# Patient Record
Sex: MALE | Race: WHITE | Employment: FULL TIME | URBAN - NONMETROPOLITAN AREA
[De-identification: names, ages, dates, MRNs, and addresses within clinical notes are randomized per-mention and may not be internally consistent; named-entity substitution may affect disease eponyms.]

---

## 2017-04-24 RX ORDER — TESTOSTERONE CYPIONATE 200 MG/ML
200 INJECTION INTRAMUSCULAR
Qty: 10 ML | Refills: 1 | Status: SHIPPED | OUTPATIENT
Start: 2017-04-24 | End: 2017-12-08 | Stop reason: SDUPTHER

## 2017-09-27 DIAGNOSIS — K21.9 GASTROESOPHAGEAL REFLUX DISEASE WITH HIATAL HERNIA: ICD-10-CM

## 2017-09-27 DIAGNOSIS — K44.9 GASTROESOPHAGEAL REFLUX DISEASE WITH HIATAL HERNIA: ICD-10-CM

## 2017-09-27 DIAGNOSIS — I10 ESSENTIAL HYPERTENSION: ICD-10-CM

## 2017-09-27 RX ORDER — OMEPRAZOLE 40 MG/1
CAPSULE, DELAYED RELEASE ORAL
Qty: 90 CAPSULE | Refills: 0 | Status: SHIPPED | OUTPATIENT
Start: 2017-09-27 | End: 2018-01-08 | Stop reason: SDUPTHER

## 2017-09-27 RX ORDER — METOPROLOL SUCCINATE 100 MG/1
TABLET, EXTENDED RELEASE ORAL
Qty: 90 TABLET | Refills: 0 | Status: SHIPPED | OUTPATIENT
Start: 2017-09-27 | End: 2018-01-08 | Stop reason: SDUPTHER

## 2017-10-05 ENCOUNTER — OFFICE VISIT (OUTPATIENT)
Dept: FAMILY MEDICINE CLINIC | Age: 32
End: 2017-10-05

## 2017-10-05 VITALS
HEIGHT: 68 IN | OXYGEN SATURATION: 99 % | SYSTOLIC BLOOD PRESSURE: 132 MMHG | HEART RATE: 91 BPM | DIASTOLIC BLOOD PRESSURE: 80 MMHG | WEIGHT: 243 LBS | BODY MASS INDEX: 36.83 KG/M2

## 2017-10-05 DIAGNOSIS — Z12.5 SCREENING PSA (PROSTATE SPECIFIC ANTIGEN): ICD-10-CM

## 2017-10-05 DIAGNOSIS — F41.9 ANXIETY: ICD-10-CM

## 2017-10-05 DIAGNOSIS — K44.9 GASTROESOPHAGEAL REFLUX DISEASE WITH HIATAL HERNIA: ICD-10-CM

## 2017-10-05 DIAGNOSIS — I10 ESSENTIAL HYPERTENSION: Primary | ICD-10-CM

## 2017-10-05 DIAGNOSIS — R53.83 FATIGUE, UNSPECIFIED TYPE: ICD-10-CM

## 2017-10-05 DIAGNOSIS — J45.20 MILD INTERMITTENT ASTHMA WITHOUT COMPLICATION: ICD-10-CM

## 2017-10-05 DIAGNOSIS — K21.9 GASTROESOPHAGEAL REFLUX DISEASE WITH HIATAL HERNIA: ICD-10-CM

## 2017-10-05 DIAGNOSIS — G47.9 SLEEP DISTURBANCE: ICD-10-CM

## 2017-10-05 DIAGNOSIS — E29.1 HYPOGONADISM IN MALE: ICD-10-CM

## 2017-10-05 PROCEDURE — 99214 OFFICE O/P EST MOD 30 MIN: CPT | Performed by: FAMILY MEDICINE

## 2017-10-05 RX ORDER — ALBUTEROL SULFATE 90 UG/1
2 AEROSOL, METERED RESPIRATORY (INHALATION) EVERY 6 HOURS PRN
Qty: 1 INHALER | Refills: 3 | Status: SHIPPED | OUTPATIENT
Start: 2017-10-05 | End: 2017-12-14

## 2017-10-05 RX ORDER — TRAZODONE HYDROCHLORIDE 50 MG/1
50 TABLET ORAL NIGHTLY
Qty: 30 TABLET | Refills: 2 | Status: SHIPPED | OUTPATIENT
Start: 2017-10-05 | End: 2017-12-14

## 2017-10-05 ASSESSMENT — PATIENT HEALTH QUESTIONNAIRE - PHQ9
1. LITTLE INTEREST OR PLEASURE IN DOING THINGS: 0
2. FEELING DOWN, DEPRESSED OR HOPELESS: 0
SUM OF ALL RESPONSES TO PHQ9 QUESTIONS 1 & 2: 0
SUM OF ALL RESPONSES TO PHQ QUESTIONS 1-9: 0

## 2017-10-05 NOTE — MR AVS SNAPSHOT
type 2 diabetes, stroke, gallstones, arthritis, sleep apnea, and certain cancers. BMI is not perfect. It may overestimate body fat in athletes and people who are more muscular. Even a small weight loss (between 5 and 10 percent of your current weight) by decreasing your calorie intake and becoming more physically active will help lower your risk of developing or worsening diseases associated with obesity. Learn more at: [employer]             Today's Medication Changes          These changes are accurate as of: 10/5/17  5:07 PM.  If you have any questions, ask your nurse or doctor. START taking these medications           albuterol sulfate  (90 Base) MCG/ACT inhaler   Commonly known as:  PROAIR HFA   Instructions:  Inhale 2 puffs into the lungs every 6 hours as needed for Wheezing   Quantity:  1 Inhaler   Refills:  3   Started by:  Dominic Starks MD       traZODone 50 MG tablet   Commonly known as:  DESYREL   Instructions:   Take 1 tablet by mouth nightly   Quantity:  30 tablet   Refills:  2   Started by:  Dominic Starks MD         STOP taking these medications           amLODIPine 10 MG tablet   Commonly known as:  NORVASC   Stopped by:  Dominic Starks MD       Testosterone 30 MG/ACT Soln   Commonly known as:  [place] by:  Dominic Starks MD            Where to Get Your Medications      These medications were sent to [place] [address] [facility] - [facility] [address] - P [phone] - F [phone]  [address] [facility] [address]     Phone:  [phone]     albuterol sulfate  (90 Base) MCG/ACT inhaler    traZODone 50 MG tablet               Your Current Medications Are              albuterol sulfate HFA (PROAIR HFA) 108 (90 Base) MCG/ACT inhaler Inhale 2 puffs into the lungs every 6 hours as needed for Wheezing    traZODone (DESYREL) 50 MG tablet Take 1 tablet by mouth nightly metoprolol succinate (TOPROL XL) 100 MG extended release tablet TAKE 1 TABLET BY MOUTH DAILY    omeprazole (PRILOSEC) 40 MG delayed release capsule TAKE 1 CAPSULE BY MOUTH DAILY    testosterone cypionate (DEPOTESTOTERONE CYPIONATE) 200 MG/ML injection Inject 1 mL into the muscle every 14 days    Insulin Pen Needle 29G X 12.7MM MISC 1 each by Does not apply route daily    Insulin Syringes, Disposable, U-100 1 ML MISC 1 each by Does not apply route daily    clotrimazole-betamethasone (LOTRISONE) 1-0.05 % cream Apply topically 2 times daily. Allergies              Amlodipine Swelling    Compazine [Prochlorperazine Maleate] Swelling    Lisinopril Hives, Swelling    Skin tightens on his face and gets hives         Additional Information        Basic Information     Date Of Birth Sex Race Ethnicity Preferred Language    1985 Male White Non-/Non  English      Problem List as of 10/5/2017  Date Reviewed: 3/10/2016                Hypertension    Asthma    Gastroesophageal reflux disease with hiatal hernia    Dermatitis      Preventive Care        Date Due    HIV screening is recommended for all people regardless of risk factors  aged 15-65 years at least once (lifetime) who have never been HIV tested. 6/15/1998    Tetanus Combination Vaccine (1 - Tdap) 6/15/2002    Pneumococcal Vaccine - Pneumovax for adults aged 19-64 years with: chronic heart disease, chronic lung disease, diabetes mellitus, alcoholism, chronic liver disease, or cigarette smoking. (1 of 1 - PPSV23) 6/15/2002    Yearly Flu Vaccine (1) 9/1/2017            [employer] Signup           Our records indicate that you have an active [employer] account. You can view your After Visit Summary by going to [email or web address] and logging in with your [employer] username and password.       If you don't have a [employer] username and password but a parent or guardian has access to your record, the parent or guardian should login with their own [employer] username and password and access your record to view the After Visit Summary. Additional Information  If you have questions, please contact the physician practice where you receive care. Remember, [employer] is NOT to be used for urgent needs. For medical emergencies, dial 911. For questions regarding your [employer] account call [phone]. If you have a clinical question, please call your doctor's office.

## 2017-10-05 NOTE — PROGRESS NOTES
Chief Complaint   Patient presents with    Annual Exam     annual exam       HPI:  Saman Arboleda is a 27 y.o. male      Follow up HTN, lipids, hypogonadism    Taking meds without incident    ALSO  Asthma acting up  Used to be on controllers      Over Due for labs  Continues on testosterone  Still with fatigue      ALSO  Mom undergoing some cancer treatments  Anxiety  Work stress  Not sleeping well      Wt Readings from Last 3 Encounters:   10/05/17 243 lb (110.2 kg)   03/10/16 241 lb (109.3 kg)   09/08/15 234 lb 3.2 oz (106.2 kg)         Past Medical History:   Diagnosis Date    Asthma     Dermatitis     Fatty liver     Gastroesophageal reflux disease with hiatal hernia     Hypertension     Obesity (BMI 30-39. 9)      Past Surgical History:   Procedure Laterality Date    LYMPH NODE BIOPSY      surgery due to cat scratch disease age 1   [facility] WISDOM TOOTH EXTRACTION       No family history on file.   Social History     Social History    Marital status:      Spouse name: N/A    Number of children: 1    Years of education: N/A     Social History Main Topics    Smoking status: Never Smoker    Smokeless tobacco: Never Used    Alcohol use No    Drug use: No    Sexual activity: Not Asked     Other Topics Concern    None     Social History Narrative     Current Outpatient Prescriptions   Medication Sig Dispense Refill    albuterol sulfate HFA (PROAIR HFA) 108 (90 Base) MCG/ACT inhaler Inhale 2 puffs into the lungs every 6 hours as needed for Wheezing 1 Inhaler 3    traZODone (DESYREL) 50 MG tablet Take 1 tablet by mouth nightly 30 tablet 2    metoprolol succinate (TOPROL XL) 100 MG extended release tablet TAKE 1 TABLET BY MOUTH DAILY 90 tablet 0    omeprazole (PRILOSEC) 40 MG delayed release capsule TAKE 1 CAPSULE BY MOUTH DAILY 90 capsule 0    testosterone cypionate (DEPOTESTOTERONE CYPIONATE) 200 MG/ML injection Inject 1 mL into the muscle every 14 days 10 mL 1    Insulin Pen Needle 29G X 12.7MM MISC 1 each by Does not apply route daily 100 each 3    Insulin Syringes, Disposable, U-100 1 ML MISC 1 each by Does not apply route daily 100 each 3    clotrimazole-betamethasone (LOTRISONE) 1-0.05 % cream Apply topically 2 times daily. 45 g 1     No current facility-administered medications for this visit. Allergies   Allergen Reactions    Amlodipine Swelling    Compazine [Prochlorperazine Maleate] Swelling    Lisinopril Hives and Swelling     Skin tightens on his face and gets hives       Review of Systems:   General ROS: no f/c/n/v  Respiratory ROS: no cough, shortness of breath, or wheezing  Cardiovascular ROS: no chest pain or dyspnea on exertion  Gastrointestinal ROS: no abdominal pain, change in bowel habits, or black or bloody stools  Genito-Urinary ROS: per HPI  Musculoskeletal ROS: negative for - gait disturbance, joint pain or joint stiffness  Neurological ROS: negative for - behavioral changes, memory loss, numbness/tingling, tremors or weakness    In general patient otherwise reports feeling well. Physical Exam:  /80  Pulse 91  Ht 5' 8\" (1.727 m)  Wt 243 lb (110.2 kg)  SpO2 99%  BMI 36.95 kg/m2    Gen: Well, NAD, Alert, Oriented x 3   HEENT: EOMI, eyes clear, MMM  Skin: bilateral scaly patches with odor dorsum of feet  Neck:thyromegaly  Lungs: CTA B w/out Rales/Wheezes/Rhonchi, Good respiratory effort   Heart: RRR, S1S2, w/out M/R/G, non-displaced PMI   Abdomen: Soft NT/ND, w/out R/G, w/ +BSx4   Ext: No C/C/E Bilaterally. Neuro: Neurovascularly intact w/ Sensory/Motor intact UE/LE Bilaterally.     Lab Results   Component Value Date    WBC 7.7 03/04/2016    HGB 15.5 03/04/2016    HCT 47.2 03/04/2016     03/04/2016    CHOL 206 (H) 03/04/2016    TRIG 133 03/04/2016    HDL 43 03/04/2016    ALT 82 (H) 03/04/2016    AST 28 03/04/2016     03/04/2016    K 4.8 03/04/2016     03/04/2016    CREATININE 0.73 03/04/2016    BUN 18 03/04/2016    CO2 22 03/04/2016 TSH 3.580 03/10/2016         A&P  1. Essential hypertension  Comprehensive Metabolic Panel    Lipid Panel   2. Hypogonadism in male  Testosterone Male   3. Gastroesophageal reflux disease with hiatal hernia     4. Screening PSA (prostate specific antigen)  PSA Screening   5. Fatigue, unspecified type  CBC    TSH without Reflex   6. Mild intermittent asthma without complication  albuterol sulfate HFA (PROAIR HFA) 108 (90 Base) MCG/ACT inhaler   7. Anxiety  traZODone (DESYREL) 50 MG tablet   8.  Sleep disturbance  traZODone (DESYREL) 50 MG tablet     Trial of trazodone  Fasting labs    proair inhaler     Continue current bp med  Healthy diet and exercise    Continue testosteone    Will get flu shot at drug Kathy Gamez MD

## 2017-10-30 DIAGNOSIS — Z12.5 SCREENING PSA (PROSTATE SPECIFIC ANTIGEN): ICD-10-CM

## 2017-10-30 DIAGNOSIS — I10 ESSENTIAL HYPERTENSION: ICD-10-CM

## 2017-10-30 DIAGNOSIS — R53.83 FATIGUE, UNSPECIFIED TYPE: ICD-10-CM

## 2017-10-30 DIAGNOSIS — E29.1 HYPOGONADISM IN MALE: ICD-10-CM

## 2017-10-30 LAB
ALBUMIN SERPL-MCNC: 4.2 G/DL (ref 3.9–4.9)
ALP BLD-CCNC: 49 U/L (ref 35–104)
ALT SERPL-CCNC: 66 U/L (ref 0–41)
ANION GAP SERPL CALCULATED.3IONS-SCNC: 15 MEQ/L (ref 7–13)
AST SERPL-CCNC: 26 U/L (ref 0–40)
BILIRUB SERPL-MCNC: 0.5 MG/DL (ref 0–1.2)
BUN BLDV-MCNC: 11 MG/DL (ref 6–20)
CALCIUM SERPL-MCNC: 9.1 MG/DL (ref 8.6–10.2)
CHLORIDE BLD-SCNC: 102 MEQ/L (ref 98–107)
CHOLESTEROL, TOTAL: 202 MG/DL (ref 0–199)
CO2: 25 MEQ/L (ref 22–29)
CREAT SERPL-MCNC: 0.67 MG/DL (ref 0.7–1.2)
GFR AFRICAN AMERICAN: >60
GFR NON-AFRICAN AMERICAN: >60
GLOBULIN: 2.3 G/DL (ref 2.3–3.5)
GLUCOSE BLD-MCNC: 92 MG/DL (ref 74–109)
HCT VFR BLD CALC: 50.4 % (ref 42–52)
HDLC SERPL-MCNC: 38 MG/DL (ref 40–59)
HEMOGLOBIN: 16.7 G/DL (ref 14–18)
LDL CHOLESTEROL CALCULATED: 134 MG/DL (ref 0–129)
MCH RBC QN AUTO: 29.5 PG (ref 27–31.3)
MCHC RBC AUTO-ENTMCNC: 33.1 % (ref 33–37)
MCV RBC AUTO: 89 FL (ref 80–100)
PDW BLD-RTO: 14.1 % (ref 11.5–14.5)
PLATELET # BLD: 310 K/UL (ref 130–400)
POTASSIUM SERPL-SCNC: 4.7 MEQ/L (ref 3.5–5.1)
PROSTATE SPECIFIC ANTIGEN: 0.44 NG/ML
RBC # BLD: 5.66 M/UL (ref 4.7–6.1)
SODIUM BLD-SCNC: 142 MEQ/L (ref 132–144)
TOTAL PROTEIN: 6.5 G/DL (ref 6.4–8.1)
TRIGL SERPL-MCNC: 152 MG/DL (ref 0–200)
TSH SERPL DL<=0.05 MIU/L-ACNC: 3.1 UIU/ML (ref 0.27–4.2)
WBC # BLD: 10.6 K/UL (ref 4.8–10.8)

## 2017-10-31 LAB — TESTOSTERONE TOTAL-MALE: 837 NG/DL (ref 300–1080)

## 2017-12-14 ENCOUNTER — OFFICE VISIT (OUTPATIENT)
Dept: FAMILY MEDICINE CLINIC | Age: 32
End: 2017-12-14

## 2017-12-14 VITALS
HEART RATE: 87 BPM | BODY MASS INDEX: 37.28 KG/M2 | WEIGHT: 246 LBS | TEMPERATURE: 98 F | HEIGHT: 68 IN | DIASTOLIC BLOOD PRESSURE: 82 MMHG | OXYGEN SATURATION: 98 % | SYSTOLIC BLOOD PRESSURE: 130 MMHG

## 2017-12-14 DIAGNOSIS — R40.0 DAYTIME SLEEPINESS: ICD-10-CM

## 2017-12-14 DIAGNOSIS — M25.50 POLYARTHRALGIA: ICD-10-CM

## 2017-12-14 DIAGNOSIS — G47.9 SLEEP DISTURBANCE: ICD-10-CM

## 2017-12-14 DIAGNOSIS — R53.83 FATIGUE, UNSPECIFIED TYPE: ICD-10-CM

## 2017-12-14 DIAGNOSIS — M25.50 POLYARTHRALGIA: Primary | ICD-10-CM

## 2017-12-14 DIAGNOSIS — R06.83 SNORING: ICD-10-CM

## 2017-12-14 LAB — SEDIMENTATION RATE, ERYTHROCYTE: 1 MM (ref 0–10)

## 2017-12-14 PROCEDURE — 99213 OFFICE O/P EST LOW 20 MIN: CPT | Performed by: FAMILY MEDICINE

## 2017-12-14 RX ORDER — METHYLPREDNISOLONE 4 MG/1
TABLET ORAL
Qty: 1 KIT | Refills: 0 | Status: SHIPPED | OUTPATIENT
Start: 2017-12-14 | End: 2018-01-15

## 2017-12-14 RX ORDER — DULOXETIN HYDROCHLORIDE 30 MG/1
30 CAPSULE, DELAYED RELEASE ORAL DAILY
Qty: 30 CAPSULE | Refills: 3 | Status: SHIPPED | OUTPATIENT
Start: 2017-12-14 | End: 2018-01-15 | Stop reason: SDUPTHER

## 2017-12-14 NOTE — PROGRESS NOTES
Chief Complaint   Patient presents with    Other     \" has been feeling well for a long time\", hand and feet pain, arm pain, in morngs can't walk, trouble sleeping    Sinus Problem     x 1 week        HPI:  Adilson Richards is a 27 y.o. male    \"just not feeling well for a long time\"  Morning stiffness    Anxiety    Hands and feet \"kill him\" in the morning    \"forgetful\"    Labs were ok  Continues on testosterone  Still with fatigue      Mom undergoing some cancer treatments  Anxiety  Work stress  Not sleeping well    Taking no advil/aleve or other pain meds    Morning stiffness seems to work itself out    Denies red, hot, swollen joints    Hands will get \"weird tingling feeling\"    Wt Readings from Last 3 Encounters:   12/14/17 246 lb (111.6 kg)   10/05/17 243 lb (110.2 kg)   03/10/16 241 lb (109.3 kg)         Past Medical History:   Diagnosis Date    Asthma     Dermatitis     Fatty liver     Gastroesophageal reflux disease with hiatal hernia     Hypertension     Obesity (BMI 30-39. 9)      Past Surgical History:   Procedure Laterality Date    LYMPH NODE BIOPSY      surgery due to cat scratch disease age 1   [facility] WISDOM TOOTH EXTRACTION       History reviewed. No pertinent family history. Social History     Social History    Marital status:      Spouse name: N/A    Number of children: 1    Years of education: N/A     Social History Main Topics    Smoking status: Never Smoker    Smokeless tobacco: Never Used    Alcohol use No    Drug use: No    Sexual activity: Not Asked     Other Topics Concern    None     Social History Narrative    None     Current Outpatient Prescriptions   Medication Sig Dispense Refill    DULoxetine (CYMBALTA) 30 MG extended release capsule Take 1 capsule by mouth daily 30 capsule 3    methylPREDNISolone (MEDROL DOSEPACK) 4 MG tablet Take by mouth.  1 kit 0    testosterone cypionate (DEPOTESTOTERONE CYPIONATE) 200 MG/ML injection INJECT 1 ML INTO THE MUSCLE EVERY 14 10/30/2017    ALT 66 (H) 10/30/2017    AST 26 10/30/2017     10/30/2017    K 4.7 10/30/2017     10/30/2017    CREATININE 0.67 (L) 10/30/2017    BUN 11 10/30/2017    CO2 25 10/30/2017    TSH 3.100 10/30/2017    PSA 0.44 10/30/2017         A&P  1. Polyarthralgia  Sedimentation Rate    C-Reactive Protein    ALLISON    Rheumatoid Factor    DULoxetine (CYMBALTA) 30 MG extended release capsule    methylPREDNISolone (MEDROL DOSEPACK) 4 MG tablet   2. Fatigue, unspecified type  Vitamin D 25 Hydroxy    Sleep Study with PAP Titration   3. Sleep disturbance  Sleep Study with PAP Titration   4. Snoring     5.  Daytime sleepiness  Sleep Study with PAP Titration     Sleep study  Medrol aimee  Rheum panel       Continue Ivonne Ji MD

## 2017-12-15 LAB
C-REACTIVE PROTEIN: 1.7 MG/L (ref 0–5)
RHEUMATOID FACTOR: <10 IU/ML (ref 0–14)
VITAMIN D 25-HYDROXY: 17.3 NG/ML (ref 30–100)

## 2017-12-19 LAB
ANA INTERPRETATION: NORMAL
ANTI-NUCLEAR ANTIBODY (ANA): NEGATIVE

## 2018-01-08 DIAGNOSIS — K21.9 GASTROESOPHAGEAL REFLUX DISEASE WITH HIATAL HERNIA: ICD-10-CM

## 2018-01-08 DIAGNOSIS — K44.9 GASTROESOPHAGEAL REFLUX DISEASE WITH HIATAL HERNIA: ICD-10-CM

## 2018-01-08 DIAGNOSIS — I10 ESSENTIAL HYPERTENSION: ICD-10-CM

## 2018-01-08 DIAGNOSIS — G47.9 SLEEP DISTURBANCE: ICD-10-CM

## 2018-01-08 DIAGNOSIS — F41.9 ANXIETY: ICD-10-CM

## 2018-01-08 RX ORDER — OMEPRAZOLE 40 MG/1
CAPSULE, DELAYED RELEASE ORAL
Qty: 90 CAPSULE | Refills: 0 | Status: SHIPPED | OUTPATIENT
Start: 2018-01-08 | End: 2018-01-15 | Stop reason: SDUPTHER

## 2018-01-08 RX ORDER — TRAZODONE HYDROCHLORIDE 50 MG/1
50 TABLET ORAL NIGHTLY
Qty: 90 TABLET | Refills: 2 | Status: SHIPPED | OUTPATIENT
Start: 2018-01-08 | End: 2018-01-09 | Stop reason: SDUPTHER

## 2018-01-08 RX ORDER — METOPROLOL SUCCINATE 100 MG/1
TABLET, EXTENDED RELEASE ORAL
Qty: 90 TABLET | Refills: 0 | Status: SHIPPED | OUTPATIENT
Start: 2018-01-08 | End: 2018-01-15 | Stop reason: SDUPTHER

## 2018-01-09 DIAGNOSIS — F41.9 ANXIETY: ICD-10-CM

## 2018-01-09 DIAGNOSIS — G47.9 SLEEP DISTURBANCE: ICD-10-CM

## 2018-01-09 RX ORDER — TRAZODONE HYDROCHLORIDE 50 MG/1
50 TABLET ORAL NIGHTLY
Qty: 90 TABLET | Refills: 2 | Status: SHIPPED | OUTPATIENT
Start: 2018-01-09 | End: 2018-01-15 | Stop reason: SDUPTHER

## 2018-01-15 ENCOUNTER — OFFICE VISIT (OUTPATIENT)
Dept: FAMILY MEDICINE CLINIC | Age: 33
End: 2018-01-15

## 2018-01-15 VITALS
HEIGHT: 68 IN | SYSTOLIC BLOOD PRESSURE: 136 MMHG | WEIGHT: 243.8 LBS | BODY MASS INDEX: 36.95 KG/M2 | HEART RATE: 82 BPM | OXYGEN SATURATION: 98 % | DIASTOLIC BLOOD PRESSURE: 82 MMHG

## 2018-01-15 DIAGNOSIS — I10 ESSENTIAL HYPERTENSION: ICD-10-CM

## 2018-01-15 DIAGNOSIS — E29.1 HYPOGONADISM IN MALE: Primary | ICD-10-CM

## 2018-01-15 DIAGNOSIS — K44.9 GASTROESOPHAGEAL REFLUX DISEASE WITH HIATAL HERNIA: ICD-10-CM

## 2018-01-15 DIAGNOSIS — M25.50 POLYARTHRALGIA: ICD-10-CM

## 2018-01-15 DIAGNOSIS — F41.9 ANXIETY: ICD-10-CM

## 2018-01-15 DIAGNOSIS — K21.9 GASTROESOPHAGEAL REFLUX DISEASE WITH HIATAL HERNIA: ICD-10-CM

## 2018-01-15 DIAGNOSIS — G47.9 SLEEP DISTURBANCE: ICD-10-CM

## 2018-01-15 DIAGNOSIS — L98.9 CHANGING SKIN LESION: ICD-10-CM

## 2018-01-15 PROCEDURE — 99213 OFFICE O/P EST LOW 20 MIN: CPT | Performed by: FAMILY MEDICINE

## 2018-01-15 RX ORDER — OMEPRAZOLE 40 MG/1
CAPSULE, DELAYED RELEASE ORAL
Qty: 90 CAPSULE | Refills: 2 | Status: SHIPPED | OUTPATIENT
Start: 2018-01-15 | End: 2019-01-21 | Stop reason: SDUPTHER

## 2018-01-15 RX ORDER — TESTOSTERONE CYPIONATE 200 MG/ML
200 INJECTION INTRAMUSCULAR
Qty: 10 ML | Refills: 2 | Status: SHIPPED | OUTPATIENT
Start: 2018-01-15 | End: 2018-12-14 | Stop reason: SDUPTHER

## 2018-01-15 RX ORDER — TRAZODONE HYDROCHLORIDE 50 MG/1
50 TABLET ORAL NIGHTLY
Qty: 90 TABLET | Refills: 2 | Status: SHIPPED | OUTPATIENT
Start: 2018-01-15 | End: 2019-04-17

## 2018-01-15 RX ORDER — METOPROLOL SUCCINATE 100 MG/1
TABLET, EXTENDED RELEASE ORAL
Qty: 90 TABLET | Refills: 2 | Status: SHIPPED | OUTPATIENT
Start: 2018-01-15 | End: 2019-01-14

## 2018-01-15 RX ORDER — DULOXETIN HYDROCHLORIDE 30 MG/1
30 CAPSULE, DELAYED RELEASE ORAL DAILY
Qty: 90 CAPSULE | Refills: 2 | Status: SHIPPED | OUTPATIENT
Start: 2018-01-15 | End: 2018-03-13 | Stop reason: SDUPTHER

## 2018-01-23 ENCOUNTER — PROCEDURE VISIT (OUTPATIENT)
Dept: FAMILY MEDICINE CLINIC | Age: 33
End: 2018-01-23
Payer: COMMERCIAL

## 2018-01-23 DIAGNOSIS — L98.9 CHANGING SKIN LESION: Primary | ICD-10-CM

## 2018-01-23 DIAGNOSIS — R20.9 DISTURBANCE OF SKIN SENSATION: ICD-10-CM

## 2018-01-23 PROCEDURE — 11300 SHAVE SKIN LESION 0.5 CM/<: CPT | Performed by: FAMILY MEDICINE

## 2018-01-23 NOTE — PROGRESS NOTES
PROCEDURE NOTE    PRE-OP DIAGNOSIS:  rule-out Basal Cell Carcinoma and mass- unknown etiology    PROCEDURE:  Skin Lesion Excision(s)    INDICATIONS:  Lian Jay is a 27 y.o. male who presents for minor skin surgery. The patient understands all risks, benefits, indications, potential complications, and alternatives, and freely consents for the procedure. The patient also understands the option of performing no surgery, the risk for scarring, and the technique of the procedure. ANESTHESIA:  Local.    TECHNIQUE:  After informed consent was obtained, and after the skin was prepped and draped, 1% lidocaine with epinephrine for anesthetic was injected around and underneath the site. shave excision was performed. A dressing was applied and wound care instructions were provided. Gonzalo tolerated the procedure well and without complications. The patient will be alert for any signs of cutaneous infection and will follow up as instructed.       Nadeen Lovett MD\

## 2018-01-25 DIAGNOSIS — L98.9 CHANGING SKIN LESION: ICD-10-CM

## 2018-01-25 DIAGNOSIS — R20.9 DISTURBANCE OF SKIN SENSATION: ICD-10-CM

## 2018-03-13 DIAGNOSIS — M25.50 POLYARTHRALGIA: ICD-10-CM

## 2018-03-13 RX ORDER — DULOXETIN HYDROCHLORIDE 30 MG/1
30 CAPSULE, DELAYED RELEASE ORAL DAILY
Qty: 90 CAPSULE | Refills: 2 | Status: SHIPPED | OUTPATIENT
Start: 2018-03-13 | End: 2018-03-14 | Stop reason: SDUPTHER

## 2018-03-14 DIAGNOSIS — M25.50 POLYARTHRALGIA: ICD-10-CM

## 2018-03-14 NOTE — TELEPHONE ENCOUNTER
Pt requesting refill. Please approve or deny this refill request. The order is pended. Thank you. LOV 1/15/2018    Next Visit Date:  No future appointments.     Requested Prescriptions     Pending Prescriptions Disp Refills    DULoxetine (CYMBALTA) 30 MG extended release capsule 90 capsule 2     Sig: Take 1 capsule by mouth daily

## 2018-03-15 RX ORDER — DULOXETIN HYDROCHLORIDE 30 MG/1
30 CAPSULE, DELAYED RELEASE ORAL DAILY
Qty: 90 CAPSULE | Refills: 2 | Status: SHIPPED | OUTPATIENT
Start: 2018-03-15 | End: 2018-03-19 | Stop reason: SDUPTHER

## 2018-03-19 DIAGNOSIS — M25.50 POLYARTHRALGIA: ICD-10-CM

## 2018-03-19 RX ORDER — DULOXETIN HYDROCHLORIDE 30 MG/1
30 CAPSULE, DELAYED RELEASE ORAL DAILY
Qty: 90 CAPSULE | Refills: 2 | Status: SHIPPED | OUTPATIENT
Start: 2018-03-19 | End: 2018-10-01 | Stop reason: SDUPTHER

## 2018-07-09 ENCOUNTER — OFFICE VISIT (OUTPATIENT)
Dept: FAMILY MEDICINE CLINIC | Age: 33
End: 2018-07-09
Payer: COMMERCIAL

## 2018-07-09 VITALS
BODY MASS INDEX: 35.8 KG/M2 | SYSTOLIC BLOOD PRESSURE: 122 MMHG | OXYGEN SATURATION: 100 % | TEMPERATURE: 97.6 F | WEIGHT: 236.2 LBS | DIASTOLIC BLOOD PRESSURE: 78 MMHG | RESPIRATION RATE: 14 BRPM | HEIGHT: 68 IN | HEART RATE: 99 BPM

## 2018-07-09 DIAGNOSIS — K64.8 INFLAMED INTERNAL HEMORRHOID: Primary | ICD-10-CM

## 2018-07-09 PROCEDURE — 99213 OFFICE O/P EST LOW 20 MIN: CPT | Performed by: NURSE PRACTITIONER

## 2018-07-09 ASSESSMENT — ENCOUNTER SYMPTOMS
SINUS PRESSURE: 0
DIARRHEA: 0
EYE ITCHING: 0
NAUSEA: 0
TROUBLE SWALLOWING: 0
COUGH: 0
SHORTNESS OF BREATH: 0
RHINORRHEA: 0
VOMITING: 0
EYE DISCHARGE: 0
PHOTOPHOBIA: 0
EYE REDNESS: 0
ABDOMINAL PAIN: 0
EYE PAIN: 0

## 2018-07-09 NOTE — LETTER
[address], [address]  [address] [place] [address]  Phone: [phone]  Fax: [phone]    ELPIDIO Marx CNP        July 9, 2018     Patient: Ana Rosa Villagran   YOB: 1985   Date of Visit: 7/9/2018       To Whom it May Concern:    Hazel Barnes was seen in my clinic on 7/9/2018. He may return to work on 7/10/18. If you have any questions or concerns, please don't hesitate to call.     Sincerely,         ELPIDIO Marx CNP

## 2018-07-09 NOTE — PATIENT INSTRUCTIONS
disease and have to limit fluids, talk with your doctor before you increase the amount of fluids you drink. · Use a stool softener that contains bran or psyllium. You can save money by buying bran or psyllium (available in bulk at most health food stores) and sprinkling it on foods or stirring it into fruit juice. Or you can use a product such as Metamucil or Hydrocil. · Practice healthy bowel habits. ¨ Go to the bathroom as soon as you have the urge. ¨ Avoid straining to pass stools. Relax and give yourself time to let things happen naturally. ¨ Do not hold your breath while passing stools. ¨ Do not read while sitting on the toilet. Get off the toilet as soon as you have finished. · Take your medicines exactly as prescribed. Call your doctor if you think you are having a problem with your medicine. When should you call for help? Call 911 anytime you think you may need emergency care. For example, call if:    · You pass maroon or very bloody stools.    Call your doctor now or seek immediate medical care if:    · You have increased pain.     · You have increased bleeding.    Watch closely for changes in your health, and be sure to contact your doctor if:    · Your symptoms have not improved after 3 or 4 days. Where can you learn more? Go to https://listedplaces."[employer] org and sign in to your [employer] account. Enter G095 in the [facility] box to learn more about \"Hemorrhoids: Care Instructions. \"     If you do not have an account, please click on the \"Sign Up Now\" link. Current as of: May 12, 2017  Content Version: 11.6  © 3601-5382 [employer]. Care instructions adapted under license by [facility] ([facility]). If you have questions about a medical condition or this instruction, always ask your healthcare professional. [address] any warranty or liability for your use of this information.

## 2018-07-09 NOTE — PROGRESS NOTES
tablet 2    Insulin Pen Needle 29G X 12.7MM MISC 1 each by Does not apply route daily 100 each 3    Insulin Syringes, Disposable, U-100 1 ML MISC 1 each by Does not apply route daily 100 each 3     No current facility-administered medications for this visit. Review of Systems   Constitutional: Negative for activity change, appetite change, chills, diaphoresis, fatigue and fever. HENT: Negative for congestion, ear discharge, ear pain, postnasal drip, rhinorrhea, sinus pressure and trouble swallowing. Eyes: Negative for photophobia, pain, discharge, redness and itching. Respiratory: Negative for cough and shortness of breath. Cardiovascular: Negative for chest pain. Gastrointestinal: Negative for abdominal pain, diarrhea, nausea and vomiting. Genitourinary: Negative for decreased urine volume, difficulty urinating, discharge, dysuria, flank pain, penile pain, penile swelling, scrotal swelling and testicular pain. Musculoskeletal: Negative for arthralgias and myalgias. Skin: Negative for rash. Allergic/Immunologic: Negative for environmental allergies. Hematological: Negative for adenopathy. Objective    Vitals:    07/09/18 1148   BP: 122/78   Site: Left Arm   Position: Sitting   Cuff Size: Medium Adult   Pulse: 99   Resp: 14   Temp: 97.6 °F (36.4 °C)   TempSrc: Tympanic   SpO2: 100%   Weight: 236 lb 3.2 oz (107.1 kg)   Height: 5' 8\" (1.727 m)       Physical Exam   Constitutional: He is oriented to person, place, and time. Vital signs are normal. He appears well-developed and well-nourished. He is cooperative. He does not have a sickly appearance. No distress. HENT:   Head: Normocephalic and atraumatic. Eyes: Pupils are equal, round, and reactive to light. Neck: Normal range of motion. Neck supple. No tracheal deviation present. No thyromegaly present. Cardiovascular: Normal rate, regular rhythm and normal heart sounds. Exam reveals no gallop and no friction rub.     No murmur heard. Pulmonary/Chest: Effort normal and breath sounds normal. No respiratory distress. He has no wheezes. He has no rales. Abdominal: Soft. Normal appearance and bowel sounds are normal. He exhibits no distension. There is no tenderness. There is no CVA tenderness. Genitourinary: Rectal exam shows internal hemorrhoid and tenderness. Rectal exam shows no external hemorrhoid, no fissure and anal tone normal.         Musculoskeletal: Normal range of motion. He exhibits no edema. Neurological: He is alert and oriented to person, place, and time. Skin: Skin is warm and dry. He is not diaphoretic. Psychiatric: He has a normal mood and affect. His behavior is normal.   Nursing note and vitals reviewed. Assessment and Plan      ICD-10-CM ICD-9-CM    1. Inflamed internal hemorrhoid K64.8 455.0 Lidocaine, Anorectal, 5 % GEL       Orders Placed This Encounter   Medications    Lidocaine, Anorectal, 5 % GEL     Sig: Apply 1 g topically as needed (anal pain)     Dispense:  30 g     Refill:  1       Health Maintenance  Patient deferred PPSV23 due to unknown if insurance would cover here    Reviewed with the patient: current clinical status, medications, activities and diet. Patient advised to practice proper hand hygiene, rest as tolerated, and increase hydration. Patient advised to take medication as directed. Advised to eat a well balanced diet. Advised to use Tylenol/NSAIDs as needed for symptom management. Patient provided with at home care instructions in AVS.    Side effects, adverse effects of the medication prescribed today, as well as treatment plan and result expectations have been discussed with the patient who expresses understanding and desires to proceed with recommended treatment and action plan. Close follow up to evaluate treatment results and for coordination of care. I have reviewed the patient's medical history in detail and updated the computerized patient record.     Patient

## 2018-07-12 ENCOUNTER — OFFICE VISIT (OUTPATIENT)
Dept: FAMILY MEDICINE CLINIC | Age: 33
End: 2018-07-12
Payer: COMMERCIAL

## 2018-07-12 ENCOUNTER — TELEPHONE (OUTPATIENT)
Dept: FAMILY MEDICINE CLINIC | Age: 33
End: 2018-07-12

## 2018-07-12 VITALS
OXYGEN SATURATION: 98 % | DIASTOLIC BLOOD PRESSURE: 88 MMHG | BODY MASS INDEX: 35.58 KG/M2 | HEART RATE: 81 BPM | HEIGHT: 68 IN | SYSTOLIC BLOOD PRESSURE: 134 MMHG | WEIGHT: 234.8 LBS

## 2018-07-12 DIAGNOSIS — K60.2 RECTAL FISSURE: Primary | ICD-10-CM

## 2018-07-12 PROCEDURE — 99213 OFFICE O/P EST LOW 20 MIN: CPT | Performed by: FAMILY MEDICINE

## 2018-07-12 RX ORDER — HYDROCORTISONE ACETATE 25 MG/1
25 SUPPOSITORY RECTAL 2 TIMES DAILY
Qty: 60 SUPPOSITORY | Refills: 0 | Status: SHIPPED | OUTPATIENT
Start: 2018-07-12 | End: 2018-07-23 | Stop reason: SINTOL

## 2018-07-12 RX ORDER — HYDROCODONE BITARTRATE AND ACETAMINOPHEN 5; 325 MG/1; MG/1
1 TABLET ORAL EVERY 6 HOURS PRN
Qty: 12 TABLET | Refills: 0 | Status: SHIPPED | OUTPATIENT
Start: 2018-07-12 | End: 2018-07-15

## 2018-07-12 NOTE — PROGRESS NOTES
Results   Component Value Date    WBC 10.6 10/30/2017    HGB 16.7 10/30/2017    HCT 50.4 10/30/2017     10/30/2017    CHOL 202 (H) 10/30/2017    TRIG 152 10/30/2017    HDL 38 (L) 10/30/2017    ALT 66 (H) 10/30/2017    AST 26 10/30/2017     10/30/2017    K 4.7 10/30/2017     10/30/2017    CREATININE 0.67 (L) 10/30/2017    BUN 11 10/30/2017    CO2 25 10/30/2017    TSH 3.100 10/30/2017    PSA 0.44 10/30/2017         A&P   Diagnosis Orders   1.  Rectal fissure  hydrocortisone (ANUSOL-HC) 25 MG suppository    HYDROcodone-acetaminophen (NORCO) 5-325 MG per tablet        HC Rectal suppositories  Colorectal surgery eval if doesn't work  Sits Dustin Kang MD

## 2018-07-13 NOTE — TELEPHONE ENCOUNTER
Patient called the on-call line to notify us that he had insurance denial of his initial suppository prescription for his rectal fissure. Called and spoke with the pharmacist myself and he stated the more affordable form is the cream with the applicator. He recommended switching to this. I approved it.

## 2018-07-23 ENCOUNTER — OFFICE VISIT (OUTPATIENT)
Dept: FAMILY MEDICINE CLINIC | Age: 33
End: 2018-07-23
Payer: COMMERCIAL

## 2018-07-23 VITALS
WEIGHT: 234 LBS | DIASTOLIC BLOOD PRESSURE: 72 MMHG | HEART RATE: 70 BPM | BODY MASS INDEX: 35.46 KG/M2 | HEIGHT: 68 IN | SYSTOLIC BLOOD PRESSURE: 116 MMHG | OXYGEN SATURATION: 98 %

## 2018-07-23 DIAGNOSIS — K62.89 RECTAL MASS: ICD-10-CM

## 2018-07-23 DIAGNOSIS — K62.89 RECTAL PAIN: Primary | ICD-10-CM

## 2018-07-23 PROCEDURE — 99212 OFFICE O/P EST SF 10 MIN: CPT | Performed by: FAMILY MEDICINE

## 2018-07-23 RX ORDER — AMOXICILLIN AND CLAVULANATE POTASSIUM 875; 125 MG/1; MG/1
1 TABLET, FILM COATED ORAL 2 TIMES DAILY
Qty: 20 TABLET | Refills: 0 | Status: SHIPPED | OUTPATIENT
Start: 2018-07-23 | End: 2018-08-02

## 2018-07-23 NOTE — PROGRESS NOTES
Chief Complaint   Patient presents with    Rectal Bleeding     follow up, still going on       HPI:  More Benson is a 26 y.o. male    Still having rectal pain despite all the usual treatments for hemorrhoid that we have prescribed    Wt Readings from Last 3 Encounters:   07/23/18 234 lb (106.1 kg)   07/12/18 234 lb 12.8 oz (106.5 kg)   07/09/18 236 lb 3.2 oz (107.1 kg)         Past Medical History:   Diagnosis Date    Asthma     Dermatitis     Fatty liver     Gastroesophageal reflux disease with hiatal hernia     Hypertension     Obesity (BMI 30-39. 9)      Past Surgical History:   Procedure Laterality Date    LYMPH NODE BIOPSY      surgery due to cat scratch disease age 1   [facility] WISDOM TOOTH EXTRACTION       History reviewed. No pertinent family history.   Social History     Social History    Marital status:      Spouse name: N/A    Number of children: 1    Years of education: N/A     Social History Main Topics    Smoking status: Never Smoker    Smokeless tobacco: Never Used    Alcohol use No    Drug use: No    Sexual activity: Not Asked     Other Topics Concern    None     Social History Narrative    None     Current Outpatient Prescriptions   Medication Sig Dispense Refill    amoxicillin-clavulanate (AUGMENTIN) 875-125 MG per tablet Take 1 tablet by mouth 2 times daily for 10 days 20 tablet 0    Lidocaine, Anorectal, 5 % GEL Apply 1 g topically as needed (anal pain) 30 g 1    DULoxetine (CYMBALTA) 30 MG extended release capsule Take 1 capsule by mouth daily 90 capsule 2    metoprolol succinate (TOPROL XL) 100 MG extended release tablet TAKE 1 TABLET BY MOUTH DAILY 90 tablet 2    omeprazole (PRILOSEC) 40 MG delayed release capsule TAKE 1 CAPSULE BY MOUTH DAILY 90 capsule 2    traZODone (DESYREL) 50 MG tablet Take 1 tablet by mouth nightly 90 tablet 2    Insulin Pen Needle 29G X 12.7MM MISC 1 each by Does not apply route daily 100 each 3    Insulin Syringes, Disposable, U-100 1 ML

## 2018-07-24 ENCOUNTER — OFFICE VISIT (OUTPATIENT)
Dept: SURGERY | Age: 33
End: 2018-07-24
Payer: COMMERCIAL

## 2018-07-24 VITALS
BODY MASS INDEX: 35.46 KG/M2 | HEIGHT: 68 IN | DIASTOLIC BLOOD PRESSURE: 82 MMHG | TEMPERATURE: 96.2 F | WEIGHT: 234 LBS | SYSTOLIC BLOOD PRESSURE: 132 MMHG

## 2018-07-24 DIAGNOSIS — K60.2 ANAL FISSURE: Primary | ICD-10-CM

## 2018-07-24 PROCEDURE — 99203 OFFICE O/P NEW LOW 30 MIN: CPT | Performed by: COLON & RECTAL SURGERY

## 2018-07-24 ASSESSMENT — ENCOUNTER SYMPTOMS
VOMITING: 0
DIARRHEA: 0
RECTAL PAIN: 1
ABDOMINAL PAIN: 0
CONSTIPATION: 0

## 2018-07-24 NOTE — PROGRESS NOTES
bruise/bleed easily. Psychiatric/Behavioral: Negative for agitation. Objective:   /82   Temp 96.2 °F (35.7 °C) (Temporal)   Ht 5' 8\" (1.727 m)   Wt 234 lb (106.1 kg)   BMI 35.58 kg/m²     Physical Exam   Constitutional: He is oriented to person, place, and time. He appears well-developed and well-nourished. No distress. HENT:   Head: Normocephalic and atraumatic. Eyes: Pupils are equal, round, and reactive to light. Neck: Normal range of motion. No tracheal deviation present. Cardiovascular: Normal rate, regular rhythm and normal heart sounds. Exam reveals no gallop and no friction rub. No murmur heard. Pulmonary/Chest: Effort normal and breath sounds normal. No respiratory distress. He has no wheezes. Abdominal: Soft. He exhibits no distension. There is no tenderness. There is no rebound. Genitourinary:   Genitourinary Comments: Anal inspection shows a sentinel pile posteriorly with a posterior anal fissure present. No evidence of thrombosed hemorrhoids. No signs of perianal abscess. No significant external hemorrhoids noted. Musculoskeletal: Normal range of motion. He exhibits no edema or tenderness. Neurological: He is alert and oriented to person, place, and time. Skin: Skin is warm and dry. No rash noted. He is not diaphoretic. No erythema. Psychiatric: He has a normal mood and affect. His behavior is normal. Judgment and thought content normal.            Assessment/Plan:       Diagnosis Orders   1. Anal fissure        I gave him a prescription for topical nifedipine mixed with lidocaine ointment to assist healing his acute anal fissure. Medication instructions were given. He will return back to see me in the office in 3 weeks if problems persist to discuss potential surgical interventions including lateral internal sphincterotomy. Other conservative tips including sitz baths were described as well.             Please note this report has been partially

## 2018-08-14 ENCOUNTER — OFFICE VISIT (OUTPATIENT)
Dept: SURGERY | Age: 33
End: 2018-08-14
Payer: COMMERCIAL

## 2018-08-14 VITALS
HEIGHT: 68 IN | SYSTOLIC BLOOD PRESSURE: 128 MMHG | WEIGHT: 234 LBS | DIASTOLIC BLOOD PRESSURE: 84 MMHG | TEMPERATURE: 97.7 F | BODY MASS INDEX: 35.46 KG/M2

## 2018-08-14 DIAGNOSIS — K60.2 ANAL FISSURE: Primary | ICD-10-CM

## 2018-08-14 PROCEDURE — 99213 OFFICE O/P EST LOW 20 MIN: CPT | Performed by: COLON & RECTAL SURGERY

## 2018-08-14 ASSESSMENT — ENCOUNTER SYMPTOMS
ANAL BLEEDING: 0
DIARRHEA: 0
RECTAL PAIN: 1
COLOR CHANGE: 0
BLOOD IN STOOL: 0
VOMITING: 0
CONSTIPATION: 0
ABDOMINAL DISTENTION: 0
ABDOMINAL PAIN: 0

## 2018-08-14 NOTE — PROGRESS NOTES
Subjective:      Patient ID: Petra Hernandez is a 26 y.o. male who presents for:  Chief Complaint   Patient presents with    Follow-up       He returns to the office 3 weeks out from treatment for acute anal fissure. He has improved significantly but occasionally has days where he has some considerable pain on sitting and defecation. He has been using nitroglycerin ointment. He denies any significant rectal bleeding since being treated. The remainder of his past medical history is unchanged from previous visit. Past Medical History:   Diagnosis Date    Asthma     Dermatitis     Fatty liver     Gastroesophageal reflux disease with hiatal hernia     Hypertension     Obesity (BMI 30-39. 9)      Past Surgical History:   Procedure Laterality Date    LYMPH NODE BIOPSY      surgery due to cat scratch disease age 1   [place] WISDOM TOOTH EXTRACTION       Social History     Social History    Marital status:      Spouse name: N/A    Number of children: 1    Years of education: N/A     Occupational History    Not on file. Social History Main Topics    Smoking status: Never Smoker    Smokeless tobacco: Never Used    Alcohol use No    Drug use: No    Sexual activity: Not on file     Other Topics Concern    Not on file     Social History Narrative    No narrative on file     History reviewed. No pertinent family history. Allergies:  Amlodipine; Compazine [prochlorperazine maleate]; and Lisinopril    Review of Systems   Constitutional: Negative for activity change, appetite change and fever. Gastrointestinal: Positive for rectal pain. Negative for abdominal distention, abdominal pain, anal bleeding, blood in stool, constipation, diarrhea and vomiting. Genitourinary: Negative for difficulty urinating. Musculoskeletal: Negative for arthralgias. Skin: Negative for color change. Neurological: Negative for dizziness and headaches. Hematological: Negative for adenopathy. partially produced using speech recognition software and may cause contain errors related to that system including grammar, punctuation and spelling as well as words and phrases that may seem inappropriate.  If there are questions or concerns please feel free to contact me to clarify

## 2018-10-01 DIAGNOSIS — M25.50 POLYARTHRALGIA: ICD-10-CM

## 2018-10-01 RX ORDER — DULOXETIN HYDROCHLORIDE 30 MG/1
30 CAPSULE, DELAYED RELEASE ORAL DAILY
Qty: 90 CAPSULE | Refills: 2 | Status: SHIPPED | OUTPATIENT
Start: 2018-10-01 | End: 2019-12-17 | Stop reason: SDUPTHER

## 2018-12-14 DIAGNOSIS — E29.1 HYPOGONADISM IN MALE: ICD-10-CM

## 2018-12-17 RX ORDER — TESTOSTERONE CYPIONATE 200 MG/ML
200 INJECTION INTRAMUSCULAR
Qty: 10 ML | Refills: 2 | Status: SHIPPED | OUTPATIENT
Start: 2018-12-17 | End: 2019-07-12 | Stop reason: SDUPTHER

## 2018-12-18 ENCOUNTER — OFFICE VISIT (OUTPATIENT)
Dept: FAMILY MEDICINE CLINIC | Age: 33
End: 2018-12-18
Payer: COMMERCIAL

## 2018-12-18 VITALS
HEIGHT: 68 IN | BODY MASS INDEX: 37.89 KG/M2 | DIASTOLIC BLOOD PRESSURE: 80 MMHG | HEART RATE: 85 BPM | SYSTOLIC BLOOD PRESSURE: 140 MMHG | WEIGHT: 250 LBS | OXYGEN SATURATION: 97 % | TEMPERATURE: 98.4 F

## 2018-12-18 DIAGNOSIS — J45.21 MILD INTERMITTENT ASTHMA WITH EXACERBATION: Primary | ICD-10-CM

## 2018-12-18 DIAGNOSIS — G47.9 SLEEP DISTURBANCE: ICD-10-CM

## 2018-12-18 PROCEDURE — 99213 OFFICE O/P EST LOW 20 MIN: CPT | Performed by: FAMILY MEDICINE

## 2018-12-18 RX ORDER — METHYLPREDNISOLONE 4 MG/1
TABLET ORAL
Qty: 1 KIT | Refills: 0 | Status: SHIPPED | OUTPATIENT
Start: 2018-12-18 | End: 2019-04-17 | Stop reason: ALTCHOICE

## 2018-12-18 ASSESSMENT — PATIENT HEALTH QUESTIONNAIRE - PHQ9
SUM OF ALL RESPONSES TO PHQ9 QUESTIONS 1 & 2: 0
1. LITTLE INTEREST OR PLEASURE IN DOING THINGS: 0
SUM OF ALL RESPONSES TO PHQ QUESTIONS 1-9: 0
SUM OF ALL RESPONSES TO PHQ QUESTIONS 1-9: 0
2. FEELING DOWN, DEPRESSED OR HOPELESS: 0

## 2018-12-27 ENCOUNTER — TELEPHONE (OUTPATIENT)
Dept: FAMILY MEDICINE CLINIC | Age: 33
End: 2018-12-27

## 2018-12-27 RX ORDER — BENZONATATE 200 MG/1
200 CAPSULE ORAL 3 TIMES DAILY PRN
Qty: 30 CAPSULE | Refills: 0 | Status: SHIPPED | OUTPATIENT
Start: 2018-12-27 | End: 2018-12-27 | Stop reason: SDUPTHER

## 2018-12-27 RX ORDER — BENZONATATE 200 MG/1
200 CAPSULE ORAL 3 TIMES DAILY PRN
Qty: 30 CAPSULE | Refills: 0 | Status: SHIPPED | OUTPATIENT
Start: 2018-12-27 | End: 2019-01-03

## 2019-01-13 DIAGNOSIS — G47.9 SLEEP DISTURBANCE: ICD-10-CM

## 2019-01-13 DIAGNOSIS — F41.9 ANXIETY: ICD-10-CM

## 2019-01-14 ENCOUNTER — TELEPHONE (OUTPATIENT)
Dept: FAMILY MEDICINE CLINIC | Age: 34
End: 2019-01-14

## 2019-01-14 RX ORDER — METOPROLOL TARTRATE 50 MG/1
50 TABLET, FILM COATED ORAL 2 TIMES DAILY
Qty: 180 TABLET | Refills: 3 | Status: SHIPPED | OUTPATIENT
Start: 2019-01-14 | End: 2019-07-15 | Stop reason: SDUPTHER

## 2019-01-21 DIAGNOSIS — K44.9 GASTROESOPHAGEAL REFLUX DISEASE WITH HIATAL HERNIA: ICD-10-CM

## 2019-01-21 DIAGNOSIS — K21.9 GASTROESOPHAGEAL REFLUX DISEASE WITH HIATAL HERNIA: ICD-10-CM

## 2019-01-21 DIAGNOSIS — I10 ESSENTIAL HYPERTENSION: ICD-10-CM

## 2019-01-22 RX ORDER — METOPROLOL SUCCINATE 100 MG/1
TABLET, EXTENDED RELEASE ORAL
Qty: 90 TABLET | Refills: 2 | Status: SHIPPED | OUTPATIENT
Start: 2019-01-22 | End: 2019-04-17 | Stop reason: CLARIF

## 2019-01-22 RX ORDER — OMEPRAZOLE 40 MG/1
CAPSULE, DELAYED RELEASE ORAL
Qty: 90 CAPSULE | Refills: 2 | Status: SHIPPED | OUTPATIENT
Start: 2019-01-22 | End: 2019-09-27 | Stop reason: SDUPTHER

## 2019-03-13 ENCOUNTER — OFFICE VISIT (OUTPATIENT)
Dept: FAMILY MEDICINE CLINIC | Age: 34
End: 2019-03-13
Payer: COMMERCIAL

## 2019-03-13 VITALS
WEIGHT: 252 LBS | BODY MASS INDEX: 38.19 KG/M2 | TEMPERATURE: 98.6 F | HEIGHT: 68 IN | OXYGEN SATURATION: 98 % | SYSTOLIC BLOOD PRESSURE: 114 MMHG | HEART RATE: 93 BPM | DIASTOLIC BLOOD PRESSURE: 88 MMHG

## 2019-03-13 DIAGNOSIS — L82.0 SEBORRHEIC KERATOSES, INFLAMED: Primary | ICD-10-CM

## 2019-03-13 DIAGNOSIS — D17.21 LIPOMA OF RIGHT UPPER EXTREMITY: ICD-10-CM

## 2019-03-13 DIAGNOSIS — L29.9 ITCH: ICD-10-CM

## 2019-03-13 DIAGNOSIS — L72.0 INCLUSION CYST: ICD-10-CM

## 2019-03-13 PROCEDURE — 17110 DESTRUCTION B9 LES UP TO 14: CPT | Performed by: FAMILY MEDICINE

## 2019-03-13 PROCEDURE — 99214 OFFICE O/P EST MOD 30 MIN: CPT | Performed by: FAMILY MEDICINE

## 2019-03-13 ASSESSMENT — ENCOUNTER SYMPTOMS
CONSTIPATION: 0
ABDOMINAL PAIN: 0
TROUBLE SWALLOWING: 0
ABDOMINAL DISTENTION: 0
NAUSEA: 0
COUGH: 0
EYE DISCHARGE: 0
SHORTNESS OF BREATH: 0
COLOR CHANGE: 0
DIARRHEA: 0
VOICE CHANGE: 0

## 2019-03-13 ASSESSMENT — PATIENT HEALTH QUESTIONNAIRE - PHQ9
SUM OF ALL RESPONSES TO PHQ QUESTIONS 1-9: 0
1. LITTLE INTEREST OR PLEASURE IN DOING THINGS: 0
2. FEELING DOWN, DEPRESSED OR HOPELESS: 0
SUM OF ALL RESPONSES TO PHQ9 QUESTIONS 1 & 2: 0
SUM OF ALL RESPONSES TO PHQ QUESTIONS 1-9: 0

## 2019-04-17 ENCOUNTER — TELEPHONE (OUTPATIENT)
Dept: FAMILY MEDICINE CLINIC | Age: 34
End: 2019-04-17

## 2019-04-17 ENCOUNTER — OFFICE VISIT (OUTPATIENT)
Dept: FAMILY MEDICINE CLINIC | Age: 34
End: 2019-04-17
Payer: COMMERCIAL

## 2019-04-17 VITALS
WEIGHT: 255 LBS | BODY MASS INDEX: 38.65 KG/M2 | SYSTOLIC BLOOD PRESSURE: 138 MMHG | OXYGEN SATURATION: 94 % | TEMPERATURE: 98.4 F | HEART RATE: 95 BPM | HEIGHT: 68 IN | DIASTOLIC BLOOD PRESSURE: 86 MMHG

## 2019-04-17 DIAGNOSIS — G47.9 SLEEP DISTURBANCE: ICD-10-CM

## 2019-04-17 DIAGNOSIS — R06.83 SNORING: ICD-10-CM

## 2019-04-17 DIAGNOSIS — J40 SINOBRONCHITIS: Primary | ICD-10-CM

## 2019-04-17 DIAGNOSIS — J32.9 SINOBRONCHITIS: Primary | ICD-10-CM

## 2019-04-17 DIAGNOSIS — G47.9 SLEEP DISTURBANCE: Primary | ICD-10-CM

## 2019-04-17 DIAGNOSIS — F41.9 ANXIETY: ICD-10-CM

## 2019-04-17 DIAGNOSIS — G47.00 INSOMNIA, UNSPECIFIED TYPE: ICD-10-CM

## 2019-04-17 DIAGNOSIS — R40.0 DAYTIME SLEEPINESS: ICD-10-CM

## 2019-04-17 PROCEDURE — 99213 OFFICE O/P EST LOW 20 MIN: CPT | Performed by: NURSE PRACTITIONER

## 2019-04-17 RX ORDER — FLUTICASONE PROPIONATE 50 MCG
SPRAY, SUSPENSION (ML) NASAL
Refills: 0 | COMMUNITY
Start: 2019-04-04 | End: 2019-08-07 | Stop reason: ALTCHOICE

## 2019-04-17 RX ORDER — TRAZODONE HYDROCHLORIDE 50 MG/1
50 TABLET ORAL NIGHTLY
Qty: 30 TABLET | Refills: 0 | Status: SHIPPED | OUTPATIENT
Start: 2019-04-17 | End: 2019-08-07 | Stop reason: ALTCHOICE

## 2019-04-17 RX ORDER — METHYLPREDNISOLONE 4 MG/1
TABLET ORAL
Qty: 1 KIT | Refills: 0 | Status: SHIPPED | OUTPATIENT
Start: 2019-04-17 | End: 2019-04-23

## 2019-04-17 RX ORDER — AZITHROMYCIN 250 MG/1
250 TABLET, FILM COATED ORAL DAILY
Qty: 6 TABLET | Refills: 0 | Status: SHIPPED | OUTPATIENT
Start: 2019-04-17 | End: 2019-04-22

## 2019-04-17 RX ORDER — CETIRIZINE HYDROCHLORIDE 10 MG/1
TABLET ORAL
Refills: 0 | COMMUNITY
Start: 2019-04-04 | End: 2019-08-07 | Stop reason: ALTCHOICE

## 2019-04-17 ASSESSMENT — ENCOUNTER SYMPTOMS
SINUS PAIN: 1
SINUS PRESSURE: 1
VOMITING: 0
SORE THROAT: 1
NAUSEA: 0
SHORTNESS OF BREATH: 1
CHEST TIGHTNESS: 0
WHEEZING: 0
SWOLLEN GLANDS: 1
ABDOMINAL PAIN: 0
RHINORRHEA: 0
COUGH: 1

## 2019-04-17 NOTE — PROGRESS NOTES
Subjective  Micki Connor, 26 y.o. male presents today with:  Chief Complaint   Patient presents with    URI     pt states that he was in to see [facility] care on 4/4/19. pt states that he was doing well on treatment and then once off all came back. musinex OTC not working      Had the flu 7 weeks ago, seen at [facility] 4/4 got augmentin. Seems like since he completed the augmentin he has new symptoms  URI    This is a new problem. Episode onset: 2 weeks ago. Progression since onset: Got better with augmentin, got much worse as soon as he stopped the augmentin  There has been no fever. Associated symptoms include congestion, coughing (productive with green sputum), sinus pain, a sore throat and swollen glands. Pertinent negatives include no abdominal pain, headaches, nausea, plugged ear sensation, rash, rhinorrhea, vomiting or wheezing. He has tried decongestant for the symptoms. The treatment provided mild relief. Review of Systems   Constitutional: Positive for diaphoresis and fatigue. Negative for chills and fever. HENT: Positive for congestion, sinus pressure, sinus pain and sore throat. Negative for dental problem and rhinorrhea. Respiratory: Positive for cough (productive with green sputum) and shortness of breath. Negative for chest tightness and wheezing. Gastrointestinal: Negative for abdominal pain, nausea and vomiting. Skin: Negative for rash. Neurological: Negative for headaches. Objective    Vitals:    04/17/19 1542   BP: 138/86   Pulse: 95   Temp: 98.4 °F (36.9 °C)   SpO2: 94%   Weight: 255 lb (115.7 kg)   Height: 5' 8\" (1.727 m)       Physical Exam   Constitutional: He appears well-developed and well-nourished. No distress. HENT:   Head: Normocephalic and atraumatic. Right Ear: External ear normal. A middle ear effusion is present. Left Ear: External ear normal. A middle ear effusion is present. Nose: Mucosal edema present.  Right sinus exhibits no maxillary sinus tenderness and no frontal sinus tenderness. Left sinus exhibits no maxillary sinus tenderness and no frontal sinus tenderness. Mouth/Throat: Oropharynx is clear and moist. No oropharyngeal exudate. Eyes: Pupils are equal, round, and reactive to light. Right eye exhibits no discharge. Left eye exhibits no discharge. Neck: No tracheal deviation present. Cardiovascular: Normal rate and regular rhythm. Exam reveals no gallop and no friction rub. No murmur heard. Pulmonary/Chest: Effort normal. No stridor. No respiratory distress. He has decreased breath sounds (chest tightness). He has wheezes. He has no rhonchi. He has no rales. He exhibits no tenderness. Lymphadenopathy:     He has cervical adenopathy. Neurological: He is alert. Skin: Skin is warm. No rash noted. He is not diaphoretic. No erythema. No pallor. Vitals reviewed. POC Testing Today:No results found for this visit on 04/17/19. Assessment & Plan    Diagnosis Orders   1. Sinobronchitis  azithromycin (ZITHROMAX) 250 MG tablet    methylPREDNISolone (MEDROL DOSEPACK) 4 MG tablet   2. Insomnia, unspecified type     3. Anxiety  traZODone (DESYREL) 50 MG tablet   4. Sleep disturbance  traZODone (DESYREL) 50 MG tablet       No orders of the defined types were placed in this encounter. Antibiotic Instructions:   Complete the full course of antibiotics as ordered. To prevent antibiotic resistances please take medication as ordered and for the full duration even if you start to feel better. Take each dose with a small snack or meal to lessen potential GI upset. Consider intake of yogurt or probiotic during antibiotic use and for a few days after to help reduce the risk of developing a secondary infection. Take the yogurt or probiotic at least 2 hours after taking the antibiotic. Avoid alcohol while taking antibiotics. Oral Steroid Instructions: Take each dose with a small snack or meal to lessen potential GI upset.   Follow dosing

## 2019-04-18 NOTE — TELEPHONE ENCOUNTER
Diagnosis Orders   1. Sleep disturbance  Sleep Study with PAP Titration   2. Snoring  Sleep Study with PAP Titration   3.  Daytime sleepiness  Sleep Study with PAP Titration

## 2019-06-25 ENCOUNTER — OFFICE VISIT (OUTPATIENT)
Dept: FAMILY MEDICINE CLINIC | Age: 34
End: 2019-06-25
Payer: COMMERCIAL

## 2019-06-25 VITALS
BODY MASS INDEX: 38.49 KG/M2 | SYSTOLIC BLOOD PRESSURE: 174 MMHG | DIASTOLIC BLOOD PRESSURE: 108 MMHG | OXYGEN SATURATION: 98 % | HEART RATE: 90 BPM | WEIGHT: 254 LBS | HEIGHT: 68 IN

## 2019-06-25 DIAGNOSIS — I10 ESSENTIAL HYPERTENSION: ICD-10-CM

## 2019-06-25 DIAGNOSIS — J01.90 ACUTE BACTERIAL SINUSITIS: Primary | ICD-10-CM

## 2019-06-25 DIAGNOSIS — B96.89 ACUTE BACTERIAL SINUSITIS: Primary | ICD-10-CM

## 2019-06-25 PROCEDURE — 99213 OFFICE O/P EST LOW 20 MIN: CPT | Performed by: NURSE PRACTITIONER

## 2019-06-25 RX ORDER — METHYLPREDNISOLONE 4 MG/1
TABLET ORAL
Qty: 1 KIT | Refills: 0 | Status: SHIPPED | OUTPATIENT
Start: 2019-06-25 | End: 2019-07-01

## 2019-06-25 RX ORDER — AZITHROMYCIN 250 MG/1
250 TABLET, FILM COATED ORAL DAILY
Qty: 6 TABLET | Refills: 0 | Status: SHIPPED | OUTPATIENT
Start: 2019-06-25 | End: 2019-06-30

## 2019-06-25 RX ORDER — METOPROLOL SUCCINATE 100 MG/1
100 TABLET, EXTENDED RELEASE ORAL DAILY
Qty: 30 TABLET | Refills: 0 | Status: SHIPPED
Start: 2019-06-25 | End: 2019-07-15

## 2019-06-25 ASSESSMENT — ENCOUNTER SYMPTOMS
SHORTNESS OF BREATH: 0
SORE THROAT: 1
SINUS PAIN: 1
RHINORRHEA: 0
WHEEZING: 1
SINUS PRESSURE: 1
HOARSE VOICE: 0
COUGH: 1
CHEST TIGHTNESS: 0

## 2019-06-25 NOTE — PROGRESS NOTES
Subjective  Celene Gilford, 37 y.o. male presents today with:  Chief Complaint   Patient presents with    Sinusitis     x3days    Cough     productive       Sinusitis   This is a new problem. Episode onset: 3 days. The problem has been gradually worsening since onset. There has been no fever. His pain is at a severity of 8/10. Associated symptoms include congestion, coughing (productive), headaches, sinus pressure and a sore throat. Pertinent negatives include no chills, diaphoresis, hoarse voice, shortness of breath or sneezing. Past treatments include nothing. The treatment provided no relief. Patients BP is also high this visit. Has HTN was on Toprol  mg. Pharmacy switched to metoprolol 50 mg BID in the last few months. Currently symptomatic the last few days with blurred vision, fatigues, malaise, headache. Review of Systems   Constitutional: Positive for fatigue. Negative for chills, diaphoresis and fever. HENT: Positive for congestion, sinus pressure, sinus pain and sore throat. Negative for hoarse voice, postnasal drip, rhinorrhea and sneezing. Eyes: Positive for visual disturbance. Respiratory: Positive for cough (productive) and wheezing. Negative for chest tightness and shortness of breath. Cardiovascular: Negative for chest pain, palpitations and leg swelling. Endocrine: Negative for polydipsia, polyphagia and polyuria. Musculoskeletal: Positive for myalgias. Neurological: Positive for headaches. Negative for dizziness and weakness. Objective    Vitals:    06/25/19 1539 06/25/19 1542   BP: (!) 168/102 (!) 174/108   Pulse: 90    SpO2: 98%    Weight: 254 lb (115.2 kg)    Height: 5' 8\" (1.727 m)        Physical Exam   Constitutional: He appears well-developed and well-nourished. He does not appear ill. No distress. HENT:   Head: Normocephalic and atraumatic.    Right Ear: Tympanic membrane, external ear and ear canal normal.   Left Ear: Tympanic membrane, external ear and ear canal normal.   Nose: Mucosal edema present. Right sinus exhibits frontal sinus tenderness. Right sinus exhibits no maxillary sinus tenderness. Left sinus exhibits frontal sinus tenderness. Left sinus exhibits no maxillary sinus tenderness. Mouth/Throat: Uvula is midline and oropharynx is clear and moist. No tonsillar exudate. Eyes: Right conjunctiva has a hemorrhage. Left conjunctiva has a hemorrhage. Neck: Normal range of motion. Neck supple. Cardiovascular: Normal rate, regular rhythm and normal heart sounds. Pulmonary/Chest: Effort normal and breath sounds normal. No respiratory distress. Lymphadenopathy:     He has cervical adenopathy. Skin: Skin is warm and dry. He is not diaphoretic. Vitals reviewed. POC Testing Today:No results found for this visit on 06/25/19. Assessment & Plan    Diagnosis Orders   1. Acute bacterial sinusitis  methylPREDNISolone (MEDROL DOSEPACK) 4 MG tablet    azithromycin (ZITHROMAX) 250 MG tablet   2. Essential hypertension  metoprolol succinate (TOPROL XL) 100 MG extended release tablet       No orders of the defined types were placed in this encounter. Stop metoprolol and restart Toprol xl. Take BP and record it the same time every day prior to visit with pcp. Bring BPs with you for visit. Antibiotic Instructions:   Complete the full course of antibiotics as ordered. To prevent antibiotic resistances please take medication as ordered and for the full duration even if you start to feel better. Take each dose with a small snack or meal to lessen potential GI upset. Consider intake of yogurt or probiotic during antibiotic use and for a few days after to help reduce the risk of developing a secondary infection. Take the yogurt or probiotic at least 2 hours after taking the antibiotic. Avoid alcohol while taking antibiotics. Oral Steroid Instructions: Take each dose with a small snack or meal to lessen potential GI upset.   Follow dosing instructions provided with prescription. Common side effects include difficulty sleeping and irritability. Take full course as ordered. Return in about 1 week (around 7/2/2019), or if symptoms worsen or fail to improve, for follow up with your PCP. Side effects and adverse effects of any medication prescribed today, as well as treatment plan/rationale, follow-up care, and result expectations have been discussed with the patient. Expresses understanding and desires to proceed with treatment plan. Discussed signs and symptoms which require immediate follow-up in ED/call to 911. Understanding verbalized. I have reviewed and updated the electronic medical record.     Sandra Gonzales, APRN - CNP

## 2019-07-12 DIAGNOSIS — E29.1 HYPOGONADISM IN MALE: ICD-10-CM

## 2019-07-13 RX ORDER — TESTOSTERONE CYPIONATE 200 MG/ML
200 INJECTION INTRAMUSCULAR
Qty: 6 ML | Refills: 1 | Status: SHIPPED | OUTPATIENT
Start: 2019-07-13 | End: 2020-01-16

## 2019-07-15 ENCOUNTER — OFFICE VISIT (OUTPATIENT)
Dept: FAMILY MEDICINE CLINIC | Age: 34
End: 2019-07-15
Payer: COMMERCIAL

## 2019-07-15 VITALS
HEIGHT: 68 IN | OXYGEN SATURATION: 98 % | WEIGHT: 257 LBS | SYSTOLIC BLOOD PRESSURE: 136 MMHG | HEART RATE: 99 BPM | BODY MASS INDEX: 38.95 KG/M2 | DIASTOLIC BLOOD PRESSURE: 90 MMHG

## 2019-07-15 DIAGNOSIS — E29.1 HYPOGONADISM IN MALE: ICD-10-CM

## 2019-07-15 DIAGNOSIS — Z12.5 SCREENING PSA (PROSTATE SPECIFIC ANTIGEN): ICD-10-CM

## 2019-07-15 DIAGNOSIS — G89.29 CHRONIC BILATERAL LOW BACK PAIN, WITH SCIATICA PRESENCE UNSPECIFIED: ICD-10-CM

## 2019-07-15 DIAGNOSIS — M54.5 CHRONIC BILATERAL LOW BACK PAIN, WITH SCIATICA PRESENCE UNSPECIFIED: ICD-10-CM

## 2019-07-15 DIAGNOSIS — I10 ESSENTIAL HYPERTENSION: Primary | ICD-10-CM

## 2019-07-15 DIAGNOSIS — J45.909 MILD ASTHMA WITHOUT COMPLICATION, UNSPECIFIED WHETHER PERSISTENT: ICD-10-CM

## 2019-07-15 DIAGNOSIS — R63.5 WEIGHT GAIN: ICD-10-CM

## 2019-07-15 PROCEDURE — 99213 OFFICE O/P EST LOW 20 MIN: CPT | Performed by: FAMILY MEDICINE

## 2019-07-15 RX ORDER — METOPROLOL TARTRATE 100 MG/1
100 TABLET ORAL 2 TIMES DAILY
Qty: 60 TABLET | Refills: 5 | Status: SHIPPED | OUTPATIENT
Start: 2019-07-15 | End: 2019-12-30

## 2019-08-07 ENCOUNTER — PROCEDURE VISIT (OUTPATIENT)
Dept: FAMILY MEDICINE CLINIC | Age: 34
End: 2019-08-07
Payer: COMMERCIAL

## 2019-08-07 VITALS
HEART RATE: 84 BPM | DIASTOLIC BLOOD PRESSURE: 72 MMHG | SYSTOLIC BLOOD PRESSURE: 102 MMHG | HEIGHT: 68 IN | WEIGHT: 256 LBS | TEMPERATURE: 97.9 F | OXYGEN SATURATION: 98 % | BODY MASS INDEX: 38.8 KG/M2

## 2019-08-07 DIAGNOSIS — L72.3 INFLAMED SEBACEOUS CYST: Primary | ICD-10-CM

## 2019-08-07 DIAGNOSIS — E29.1 HYPOGONADISM IN MALE: ICD-10-CM

## 2019-08-07 DIAGNOSIS — Z12.5 SCREENING PSA (PROSTATE SPECIFIC ANTIGEN): ICD-10-CM

## 2019-08-07 DIAGNOSIS — I10 ESSENTIAL HYPERTENSION: ICD-10-CM

## 2019-08-07 DIAGNOSIS — R63.5 WEIGHT GAIN: ICD-10-CM

## 2019-08-07 DIAGNOSIS — L73.9 FOLLICULITIS: ICD-10-CM

## 2019-08-07 DIAGNOSIS — D49.2 ABNORMAL SKIN GROWTH: ICD-10-CM

## 2019-08-07 DIAGNOSIS — L72.3 INFLAMED SEBACEOUS CYST: ICD-10-CM

## 2019-08-07 LAB
ALBUMIN SERPL-MCNC: 4.6 G/DL (ref 3.5–4.6)
ALP BLD-CCNC: 60 U/L (ref 35–104)
ALT SERPL-CCNC: 93 U/L (ref 0–41)
ANION GAP SERPL CALCULATED.3IONS-SCNC: 15 MEQ/L (ref 9–15)
AST SERPL-CCNC: 47 U/L (ref 0–40)
BILIRUB SERPL-MCNC: 0.7 MG/DL (ref 0.2–0.7)
BUN BLDV-MCNC: 14 MG/DL (ref 6–20)
CALCIUM SERPL-MCNC: 9.3 MG/DL (ref 8.5–9.9)
CHLORIDE BLD-SCNC: 102 MEQ/L (ref 95–107)
CHOLESTEROL, TOTAL: 236 MG/DL (ref 0–199)
CO2: 24 MEQ/L (ref 20–31)
CREAT SERPL-MCNC: 0.87 MG/DL (ref 0.7–1.2)
GFR AFRICAN AMERICAN: >60
GFR NON-AFRICAN AMERICAN: >60
GLOBULIN: 2.9 G/DL (ref 2.3–3.5)
GLUCOSE BLD-MCNC: 121 MG/DL (ref 70–99)
HCT VFR BLD CALC: 49.4 % (ref 42–52)
HDLC SERPL-MCNC: 39 MG/DL (ref 40–59)
HEMOGLOBIN: 17.3 G/DL (ref 14–18)
LDL CHOLESTEROL CALCULATED: 160 MG/DL (ref 0–129)
MCH RBC QN AUTO: 31.3 PG (ref 27–31.3)
MCHC RBC AUTO-ENTMCNC: 34.9 % (ref 33–37)
MCV RBC AUTO: 89.6 FL (ref 80–100)
PDW BLD-RTO: 13.8 % (ref 11.5–14.5)
PLATELET # BLD: 333 K/UL (ref 130–400)
POTASSIUM SERPL-SCNC: 4.4 MEQ/L (ref 3.4–4.9)
PROSTATE SPECIFIC ANTIGEN: 0.54 NG/ML
RBC # BLD: 5.52 M/UL (ref 4.7–6.1)
SODIUM BLD-SCNC: 141 MEQ/L (ref 135–144)
TOTAL PROTEIN: 7.5 G/DL (ref 6.3–8)
TRIGL SERPL-MCNC: 186 MG/DL (ref 0–150)
TSH SERPL DL<=0.05 MIU/L-ACNC: 3.13 UIU/ML (ref 0.44–3.86)
WBC # BLD: 9.2 K/UL (ref 4.8–10.8)

## 2019-08-07 PROCEDURE — 11102 TANGNTL BX SKIN SINGLE LES: CPT | Performed by: FAMILY MEDICINE

## 2019-08-07 PROCEDURE — 11401 EXC TR-EXT B9+MARG 0.6-1 CM: CPT | Performed by: FAMILY MEDICINE

## 2019-08-07 ASSESSMENT — ENCOUNTER SYMPTOMS
DIARRHEA: 0
EYE DISCHARGE: 0
NAUSEA: 0
EYE REDNESS: 0
COUGH: 0
FACIAL SWELLING: 0
WHEEZING: 0

## 2019-08-07 NOTE — PROGRESS NOTES
problems and self-injury. Objective:   /72   Pulse 84   Temp 97.9 °F (36.6 °C)   Ht 5' 8\" (1.727 m)   Wt 256 lb (116.1 kg)   SpO2 98%   BMI 38.92 kg/m²     Physical Exam   Constitutional: Vital signs are normal. He appears well-developed and well-nourished. Non-toxic appearance. No distress. HENT:   Head: Normocephalic and atraumatic. Right Ear: Hearing and tympanic membrane normal.   Left Ear: Hearing and tympanic membrane normal.   Nose: Nose normal. No nasal deformity. Mouth/Throat: Oropharynx is clear and moist and mucous membranes are normal.   Eyes: Pupils are equal, round, and reactive to light. Conjunctivae, EOM and lids are normal. Right eye exhibits no discharge. Left eye exhibits no discharge. Neck: Trachea normal, full passive range of motion without pain and phonation normal. No JVD present. No thyroid mass and no thyromegaly present. Cardiovascular: Normal rate and regular rhythm. Pulmonary/Chest: No accessory muscle usage. No respiratory distress. Abdominal: Normal appearance. Musculoskeletal:   FROM all large muscle groups and joints as witnessed when walking to exam table, getting on, and getting off the exam table. Neurological: He is alert. He displays no atrophy and no tremor. Gait normal.   Skin: Skin is warm, dry and intact. No rash noted. Patient's chest and back is diffusely covered with erythematous papules surrounding hair follicles. The occasional pustule. Ranging in size from 2 mm to 4 mm each. Center of the thoracic back there is a raised lesion measuring 6 x 8 mm cystic in texture no area of fluctuance flesh-colored. Right upper extremity lesion 6 x 4 mm irregularly-shaped and discolored with thickened rough texture. Psychiatric: He has a normal mood and affect. His speech is normal and behavior is normal. Thought content normal.       The procedure was discussed with the patient.   All questions were answered and alternative options discussed. The patient is aware of the risks of bleeding, infection,unsatisfactory scar result. Informed consent paperwork was signed by the patient. PROCEDURE: RUE  The shave biopsy was done using 0.5 cc of 1% lidocaine with epinephrine for anesthesia. A Dermablade  was used to obtain the biopsy. Drysol was used at the site for hemostasis. Good bleeding control was obtained. Less than 1 cc estimated blood loss. PROCEDURE: center back  The excisional biopsy was done using 1.5 cc of 1% lidocaine with epinephrine for anesthesia. A #15 blade  was used to obtain the biopsy. 1 matress and 3 interupted sutures of 3.0 ethilon was used at the site for hemostasis. Good bleeding control was obtained. Less than 1 cc estimated blood loss. No results found for this visit on 08/07/19.     Recent Results (from the past 2016 hour(s))   TSH without Reflex    Collection Time: 08/07/19  8:15 AM   Result Value Ref Range    TSH 3.130 0.440 - 3.860 uIU/mL   Lipid Panel    Collection Time: 08/07/19  8:15 AM   Result Value Ref Range    Cholesterol, Total 236 (H) 0 - 199 mg/dL    Triglycerides 186 (H) 0 - 150 mg/dL    HDL 39 (L) 40 - 59 mg/dL    LDL Calculated 160 (H) 0 - 129 mg/dL   Comprehensive Metabolic Panel    Collection Time: 08/07/19  8:15 AM   Result Value Ref Range    Sodium 141 135 - 144 mEq/L    Potassium 4.4 3.4 - 4.9 mEq/L    Chloride 102 95 - 107 mEq/L    CO2 24 20 - 31 mEq/L    Anion Gap 15 9 - 15 mEq/L    Glucose 121 (H) 70 - 99 mg/dL    BUN 14 6 - 20 mg/dL    CREATININE 0.87 0.70 - 1.20 mg/dL    GFR Non-African American >60.0 >60    GFR  >60.0 >60    Calcium 9.3 8.5 - 9.9 mg/dL    Total Protein 7.5 6.3 - 8.0 g/dL    Alb 4.6 3.5 - 4.6 g/dL    Total Bilirubin 0.7 0.2 - 0.7 mg/dL    Alkaline Phosphatase 60 35 - 104 U/L    ALT 93 (H) 0 - 41 U/L    AST 47 (H) 0 - 40 U/L    Globulin 2.9 2.3 - 3.5 g/dL   CBC    Collection Time: 08/07/19  8:15 AM   Result Value Ref Range    WBC 9.2 4.8 - 10.8 K/uL    RBC 5.52 4.70 - 6.10 M/uL    Hemoglobin 17.3 14.0 - 18.0 g/dL    Hematocrit 49.4 42.0 - 52.0 %    MCV 89.6 80.0 - 100.0 fL    MCH 31.3 27.0 - 31.3 pg    MCHC 34.9 33.0 - 37.0 %    RDW 13.8 11.5 - 14.5 %    Platelets 397 253 - 197 K/uL   PSA Screening    Collection Time: 08/07/19  8:15 AM   Result Value Ref Range    PSA 0.54 ng/mL   Testosterone Male    Collection Time: 08/07/19  8:15 AM   Result Value Ref Range    Total Testosterone 795 300 - 1080 ng/dL           Assessment:       Diagnosis Orders   1. Inflamed sebaceous cyst  AK EXC SKIN BENIG 0.6-1CM TRUNK,ARM,LEG    Specimen to Pathology Outpatient   2. Abnormal skin growth  79456 - AK EXC SKIN BENIG 0.6-1CM TRUNK,ARM,LEG    Specimen to Pathology Outpatient   3.  Folliculitis           Orders Placed This Encounter   Procedures    Specimen to Pathology Outpatient     Margins requested on all specimens  R/O ruptured sebaceous cyst  Location thoracic center back     Standing Status:   Future     Number of Occurrences:   1     Standing Expiration Date:   8/7/2020     Order Specific Question:   PREVIOUS BIOPSY     Answer:   No     Order Specific Question:   PREOP DIAGNOSIS     Answer:   Inflamed sebaceous cyst     Order Specific Question:   FROZEN SECTION - NO OR YES/SPECIMEN     Answer:   No    Specimen to Pathology Outpatient     Margins requested on all specimens  R/O squamous cell cancer  Location right UE     Standing Status:   Future     Number of Occurrences:   1     Standing Expiration Date:   8/7/2020     Order Specific Question:   PREVIOUS BIOPSY     Answer:   No     Order Specific Question:   PREOP DIAGNOSIS     Answer:   Abnormal skin growth     Order Specific Question:   FROZEN SECTION - NO OR YES/SPECIMEN     Answer:   No    [id number] - AK EXC SKIN BENIG 0.6-1CM TRUNK,ARM,LEG     Right upper extremity    AK EXC SKIN BENIG 0.6-1CM [email or web address]         Plan:   Return in about 2 weeks (around 8/21/2019) for Suture

## 2019-08-07 NOTE — PATIENT INSTRUCTIONS
idea saber los resultados de khadra exámenes y mantener josey lista de los medicamentos que pacheco. ¿Cómo puede cuidarse en el hogar? Para comenzar a usar el inhalador  · [employer] con dalal proveedor de atención médica para estar seguro de que esté utilizando el inhalador correctamente. Podría ayudar si practica utilizándolo frente a un mary jane. Utilice el inhalador exactamente [employer]	[place] fue indicado. · Verifique que tenga el medicamento correcto. Si utiliza más de un inhalador, coloque josey etiqueta en cada nathaly de ellos. [place] le permitirá saber cuál es el que debe usar en el momento apropiado. · Lleve la cuenta de la cantidad de medicamento que hay en el inhalador. Revise la etiqueta para ismael cuántas dosis contiene el inhalador. Si sabe la cantidad de inhalaciones que puede franchesca, puede reemplazar el inhalador antes de que se acaben las dosis. Pregúntele a dalal proveedor de atención médica cómo puede usted llevar la cuenta de cuánto [employer]. · Utilice un espaciador si le resulta difícil presionar el inhalador e inspirar al [employer]. [place]il Hakim necesite un espaciador si utiliza medicamentos con corticosteroides. · Si utiliza un inhalador con corticosteroides, damari gárgaras y enjuáguese la boca con agua después de [place]. No trague el agua. Si traga el agua, aumentará la probabilidad de que el [employer] al torrente sanguíneo. Ello podría aumentar las posibilidades de que tenga efectos secundarios. Para usar un espaciador con el inhalador  1. Agite el inhalador. Quite la tapa del inhalador y coloque la boquilla del inhalador en el espaciador. Verifique las instrucciones del inhalador para ismael si es necesario prepararlo antes de usarlo. Si necesita preparación, siga las instrucciones sobre la forma de preparar el inhalador. 2. Quite la tapa del espaciador. 3. Sostenga el inhalador en posición vertical con la boquilla hacia abajo.   4. Incline un poco la [place] atrás, y exhale lentamente y por completo. 5. Colóquese la boquilla del espaciador en la boca. 6. Presione el inhalador para liberar josey descarga de medicamento dentro del espaciador y luego comience a inspirar lentamente. Espere a inhalar hasta después de que haya presionado el inhalador. Algunos espaciadores tienen un silbato. Si lo oye, debería inhalar más lentamente. 7. Mantenga la respiración christine 10 segundos. [place] permitirá que el medicamento se asiente en khadra pulmones. 8. Si requiere franchesca josey segunda dosis, espere de 30 a 60 segundos para permitir que la válvula del inhalador se vuelva a llenar. Para usar el inhalador sin un espaciador  1. Agite el inhalador según las indicaciones y [place] 106. Verifique las instrucciones para ismael si es necesario preparar el inhalador antes de usarlo. Si necesita preparación, siga las instrucciones sobre la forma de preparar el inhalador. 2. Sostenga el inhalador en posición vertical con la boquilla hacia abajo. 3. Incline dalal polina un poco hacia atrás, y exhale lentamente y por completo. 4. Coloque el inhalador en [employer] formas:  ? Puede colocar el inhalador dentro de la boca. [place] es más fácil para la mayoría de [employer]. Además, disminuye el riesgo de que el TITI [place]. ? O puede colocar el inhalador a josey distancia de 1 a 2 pulgadas (2.5 a 5 cm) frente a dalal boca abierta, sin cerrar los labios alrededor del mismo. Trate de abrir la boca lo más que pueda. Colocar el inhalador frente a la boca abierta podría ser mejor para permitir que el medicamento [employer]. Sin embargo, a algunas personas puede resultarles difícil hacerlo. 5. Empiece a respirar por la boca, lentamente y de Natalee gentry. Presione el inhalador josey vez y, a continuación, inhale profundamente. 6. Mantenga la respiración christine 10 segundos. [place] permitirá que el medicamento se asiente en khadra pulmones.   7. Si requiere franchesca josey segunda dosis, espere de 30 a 60 segundos para

## 2019-08-09 DIAGNOSIS — R73.9 HYPERGLYCEMIA: Primary | ICD-10-CM

## 2019-08-09 LAB
HBA1C MFR BLD: 5.8 % (ref 4.8–5.9)
TESTOSTERONE TOTAL-MALE: 795 NG/DL (ref 300–1080)

## 2019-08-17 ENCOUNTER — TELEPHONE (OUTPATIENT)
Dept: FAMILY MEDICINE CLINIC | Age: 34
End: 2019-08-17

## 2019-09-27 DIAGNOSIS — K21.9 GASTROESOPHAGEAL REFLUX DISEASE WITH HIATAL HERNIA: ICD-10-CM

## 2019-09-27 DIAGNOSIS — K44.9 GASTROESOPHAGEAL REFLUX DISEASE WITH HIATAL HERNIA: ICD-10-CM

## 2019-09-30 RX ORDER — OMEPRAZOLE 40 MG/1
CAPSULE, DELAYED RELEASE ORAL
Qty: 90 CAPSULE | Refills: 2 | Status: SHIPPED | OUTPATIENT
Start: 2019-09-30 | End: 2020-07-28 | Stop reason: SDUPTHER

## 2019-10-01 ENCOUNTER — OFFICE VISIT (OUTPATIENT)
Dept: FAMILY MEDICINE CLINIC | Age: 34
End: 2019-10-01
Payer: COMMERCIAL

## 2019-10-01 VITALS
SYSTOLIC BLOOD PRESSURE: 132 MMHG | HEART RATE: 75 BPM | HEIGHT: 68 IN | WEIGHT: 250 LBS | DIASTOLIC BLOOD PRESSURE: 70 MMHG | BODY MASS INDEX: 37.89 KG/M2 | OXYGEN SATURATION: 98 %

## 2019-10-01 DIAGNOSIS — J06.9 UPPER RESPIRATORY INFECTION WITH COUGH AND CONGESTION: Primary | ICD-10-CM

## 2019-10-01 PROCEDURE — 99213 OFFICE O/P EST LOW 20 MIN: CPT | Performed by: NURSE PRACTITIONER

## 2019-10-01 RX ORDER — AZITHROMYCIN 250 MG/1
250 TABLET, FILM COATED ORAL DAILY
Qty: 6 TABLET | Refills: 0 | Status: SHIPPED | OUTPATIENT
Start: 2019-10-01 | End: 2019-10-06

## 2019-10-01 ASSESSMENT — ENCOUNTER SYMPTOMS
DIARRHEA: 0
ABDOMINAL PAIN: 0
SHORTNESS OF BREATH: 0
WHEEZING: 0
RHINORRHEA: 0
SORE THROAT: 1
VOMITING: 0
NAUSEA: 0
SINUS PAIN: 1
CHEST TIGHTNESS: 0
COUGH: 1
SINUS PRESSURE: 1

## 2019-12-17 DIAGNOSIS — M25.50 POLYARTHRALGIA: ICD-10-CM

## 2019-12-17 RX ORDER — DULOXETIN HYDROCHLORIDE 30 MG/1
CAPSULE, DELAYED RELEASE ORAL
Qty: 90 CAPSULE | Refills: 2 | Status: SHIPPED | OUTPATIENT
Start: 2019-12-17 | End: 2020-01-20 | Stop reason: SDUPTHER

## 2019-12-30 DIAGNOSIS — I10 ESSENTIAL HYPERTENSION: ICD-10-CM

## 2019-12-30 RX ORDER — METOPROLOL TARTRATE 100 MG/1
TABLET ORAL
Qty: 180 TABLET | Refills: 1 | Status: SHIPPED | OUTPATIENT
Start: 2019-12-30 | End: 2020-09-15 | Stop reason: SDUPTHER

## 2020-01-16 RX ORDER — TESTOSTERONE CYPIONATE 200 MG/ML
200 INJECTION INTRAMUSCULAR
Qty: 6 ML | Refills: 1 | Status: SHIPPED | OUTPATIENT
Start: 2020-01-16 | End: 2020-09-15 | Stop reason: SDUPTHER

## 2020-01-17 ENCOUNTER — TELEPHONE (OUTPATIENT)
Dept: FAMILY MEDICINE CLINIC | Age: 35
End: 2020-01-17

## 2020-01-17 NOTE — TELEPHONE ENCOUNTER
Pt calling states that he is very depressed and down. States that this has week has been bad. Asking to see  This morning. Nothing available until today at 2 . He denies and suicidal thoughts.    He is not able to come in at 2    Pt can be reached at [phone]

## 2020-01-20 ENCOUNTER — OFFICE VISIT (OUTPATIENT)
Dept: FAMILY MEDICINE CLINIC | Age: 35
End: 2020-01-20
Payer: COMMERCIAL

## 2020-01-20 VITALS
DIASTOLIC BLOOD PRESSURE: 80 MMHG | HEIGHT: 68 IN | BODY MASS INDEX: 37.04 KG/M2 | OXYGEN SATURATION: 87 % | WEIGHT: 244.4 LBS | HEART RATE: 98 BPM | SYSTOLIC BLOOD PRESSURE: 130 MMHG

## 2020-01-20 PROCEDURE — 99213 OFFICE O/P EST LOW 20 MIN: CPT | Performed by: FAMILY MEDICINE

## 2020-01-20 RX ORDER — DULOXETIN HYDROCHLORIDE 60 MG/1
CAPSULE, DELAYED RELEASE ORAL
Qty: 90 CAPSULE | Refills: 2 | Status: SHIPPED | OUTPATIENT
Start: 2020-01-20 | End: 2020-09-15 | Stop reason: SDUPTHER

## 2020-01-20 RX ORDER — ESCITALOPRAM OXALATE 10 MG/1
10 TABLET ORAL DAILY
Qty: 30 TABLET | Refills: 3 | Status: SHIPPED | OUTPATIENT
Start: 2020-01-20 | End: 2020-01-27

## 2020-01-20 NOTE — PROGRESS NOTES
Chief Complaint   Patient presents with    Depression     having bad depression and anxiety issues, has become overwhelming, can't tell polo yoder high cymblata dose       HPI:  Jens Light is a 37 y.o. male      Pt called in last week with worsening depression and anxiety  Told to double cymbalta and follow up    Symptoms worse over past month  Crying spells for no reason    Overwhelmed all the time  Not sleeping well    Hx HTN, lipids, hypogonadism          Wt Readings from Last 3 Encounters:   01/20/20 244 lb 6.4 oz (110.9 kg)   10/01/19 250 lb (113.4 kg)   08/07/19 256 lb (116.1 kg)         Past Medical History:   Diagnosis Date    Asthma     Dermatitis     Fatty liver     Gastroesophageal reflux disease with hiatal hernia     Hypertension     Obesity (BMI 30-39. 9)      Past Surgical History:   Procedure Laterality Date    LYMPH NODE BIOPSY      surgery due to cat scratch disease age 1   [facility] WISDOM TOOTH EXTRACTION       History reviewed. No pertinent family history.   Social History     Socioeconomic History    Marital status:      Spouse name: None    Number of children: 1    Years of education: None    Highest education level: None   Occupational History    None   Social Needs    Financial resource strain: None    Food insecurity:     Worry: None     Inability: None    Transportation needs:     Medical: None     Non-medical: None   Tobacco Use    Smoking status: Never Smoker    Smokeless tobacco: Never Used   Substance and Sexual Activity    Alcohol use: No     Alcohol/week: 0.0 standard drinks    Drug use: No    Sexual activity: None   Lifestyle    Physical activity:     Days per week: None     Minutes per session: None    Stress: None   Relationships    Social connections:     Talks on phone: None     Gets together: None     Attends Congregation service: None     Active member of club or organization: None     Attends meetings of clubs or organizations: None respiratory effort   Heart: RRR, S1S2, w/out M/R/G, non-displaced PMI   Psych:dysthymic  Neuro: Neurovascularly intact w/ Sensory/Motor intact UE/LE Bilaterally. Lab Results   Component Value Date    WBC 9.2 08/07/2019    HGB 17.3 08/07/2019    HCT 49.4 08/07/2019     08/07/2019    CHOL 236 (H) 08/07/2019    TRIG 186 (H) 08/07/2019    HDL 39 (L) 08/07/2019    ALT 93 (H) 08/07/2019    AST 47 (H) 08/07/2019     08/07/2019    K 4.4 08/07/2019     08/07/2019    CREATININE 0.87 08/07/2019    BUN 14 08/07/2019    CO2 24 08/07/2019    TSH 3.130 08/07/2019    PSA 0.54 08/07/2019    LABA1C 5.8 08/07/2019         A&P   Diagnosis Orders   1. Anxiety and depression  escitalopram (LEXAPRO) 10 MG tablet    Ambulatory referral to Psychology   2.  Polyarthralgia  DULoxetine (CYMBALTA) 60 MG extended release capsule     Counseling referral  cymbalta at 58  Add lexapro 10mg  F/u 1 month  Benadryl for sleep        Manus MD Elen

## 2020-01-27 ENCOUNTER — TELEPHONE (OUTPATIENT)
Dept: BEHAVIORAL/MENTAL HEALTH CLINIC | Age: 35
End: 2020-01-27

## 2020-01-27 ENCOUNTER — OFFICE VISIT (OUTPATIENT)
Dept: BEHAVIORAL/MENTAL HEALTH CLINIC | Age: 35
End: 2020-01-27
Payer: COMMERCIAL

## 2020-01-27 DIAGNOSIS — F32.89 OTHER DEPRESSION: Primary | ICD-10-CM

## 2020-01-27 PROCEDURE — 90791 PSYCH DIAGNOSTIC EVALUATION: CPT | Performed by: PSYCHOLOGIST

## 2020-01-27 RX ORDER — ARIPIPRAZOLE 2 MG/1
2 TABLET ORAL DAILY
Qty: 30 TABLET | Refills: 3 | Status: SHIPPED | OUTPATIENT
Start: 2020-01-27 | End: 2020-04-22

## 2020-01-27 ASSESSMENT — PATIENT HEALTH QUESTIONNAIRE - PHQ9
2. FEELING DOWN, DEPRESSED OR HOPELESS: 2
3. TROUBLE FALLING OR STAYING ASLEEP: 3
SUM OF ALL RESPONSES TO PHQ QUESTIONS 1-9: 20
5. POOR APPETITE OR OVEREATING: 1
6. FEELING BAD ABOUT YOURSELF - OR THAT YOU ARE A FAILURE OR HAVE LET YOURSELF OR YOUR FAMILY DOWN: 2
9. THOUGHTS THAT YOU WOULD BE BETTER OFF DEAD, OR OF HURTING YOURSELF: 1
SUM OF ALL RESPONSES TO PHQ9 QUESTIONS 1 & 2: 5
10. IF YOU CHECKED OFF ANY PROBLEMS, HOW DIFFICULT HAVE THESE PROBLEMS MADE IT FOR YOU TO DO YOUR WORK, TAKE CARE OF THINGS AT HOME, OR GET ALONG WITH OTHER PEOPLE: 2
4. FEELING TIRED OR HAVING LITTLE ENERGY: 2
7. TROUBLE CONCENTRATING ON THINGS, SUCH AS READING THE NEWSPAPER OR WATCHING TELEVISION: 3
1. LITTLE INTEREST OR PLEASURE IN DOING THINGS: 3
SUM OF ALL RESPONSES TO PHQ QUESTIONS 1-9: 20
8. MOVING OR SPEAKING SO SLOWLY THAT OTHER PEOPLE COULD HAVE NOTICED. OR THE OPPOSITE, BEING SO FIGETY OR RESTLESS THAT YOU HAVE BEEN MOVING AROUND A LOT MORE THAN USUAL: 3

## 2020-01-27 NOTE — PROGRESS NOTES
DAY 90 capsule 2    testosterone cypionate (DEPOTESTOTERONE CYPIONATE) 200 MG/ML injection Inject 1 mL into the muscle every 14 days for 180 days. 6 mL 1    metoprolol (LOPRESSOR) 100 MG tablet TAKE 1 TABLET BY MOUTH TWICE A  tablet 1    omeprazole (PRILOSEC) 40 MG delayed release capsule TAKE 1 CAPSULE BY MOUTH EVERY DAY 90 capsule 2     No current facility-administered medications for this visit. Social History:   Social History     Socioeconomic History    Marital status:      Spouse name: Not on file    Number of children: 1    Years of education: Not on file    Highest education level: Not on file   Occupational History    Not on file   Social Needs    Financial resource strain: Not on file    Food insecurity:     Worry: Not on file     Inability: Not on file    Transportation needs:     Medical: Not on file     Non-medical: Not on file   Tobacco Use    Smoking status: Never Smoker    Smokeless tobacco: Never Used   Substance and Sexual Activity    Alcohol use: No     Alcohol/week: 0.0 standard drinks    Drug use: No    Sexual activity: Not on file   Lifestyle    Physical activity:     Days per week: Not on file     Minutes per session: Not on file    Stress: Not on file   Relationships    Social connections:     Talks on phone: Not on file     Gets together: Not on file     Attends L[employer]-day service: Not on file     Active member of club or organization: Not on file     Attends meetings of clubs or organizations: Not on file     Relationship status: Not on file    Intimate partner violence:     Fear of current or ex partner: Not on file     Emotionally abused: Not on file     Physically abused: Not on file     Forced sexual activity: Not on file   Other Topics Concern    Not on file   Social History Narrative    Not on file         Family History:   No family history on file. TOBACCO:   reports that he has never smoked.  He has never used smokeless tobacco.  ETOH: anxiety, physical tension, worry that is difficult for him to define. Pt reports an intermittent history of depression. He does feel that some of his symptoms including anxiety and feeling bad about himself are related to his childhood. Pt symptoms may also be related to chronic PTSD presentation, but further assessment needed. Pt would benefit from [facility] services to increase coping skills to provide symptom management/control/relief. Diagnosis:    Major depressive disorder; recurrent and with anxious distress      Plan:  Pt interventions:  Provided handout on  depression, Discussed various factors related to the development and maintenance of  depression (including biological, cognitive, behavioral, and environmental factors), Provided education, Discussed self-care (sleep, nutrition, rewarding activities, social support, exercise), Established rapport, Conducted functional assessment, Supportive techniques, Emphasized self-care as important for managing overall health and Reviewed options for identifying appropriate providers        Pt Behavioral Change Plan:  Review the following information  Rate your mood daily  Pick one activity you used to enjoy and aim to resume doing this 1-3 times over the next week    Please note this report has been partially produced using speech recognition software and may cause contain errors related to that system including grammar, punctuation and spelling as well as words and phrases that may seem inappropriate. If there are questions or concerns please feel free to contact me to clarify.

## 2020-02-05 ENCOUNTER — OFFICE VISIT (OUTPATIENT)
Dept: BEHAVIORAL/MENTAL HEALTH CLINIC | Age: 35
End: 2020-02-05
Payer: COMMERCIAL

## 2020-02-05 PROCEDURE — 90832 PSYTX W PT 30 MINUTES: CPT | Performed by: PSYCHOLOGIST

## 2020-02-05 ASSESSMENT — PATIENT HEALTH QUESTIONNAIRE - PHQ9
3. TROUBLE FALLING OR STAYING ASLEEP: 2
5. POOR APPETITE OR OVEREATING: 1
1. LITTLE INTEREST OR PLEASURE IN DOING THINGS: 1
10. IF YOU CHECKED OFF ANY PROBLEMS, HOW DIFFICULT HAVE THESE PROBLEMS MADE IT FOR YOU TO DO YOUR WORK, TAKE CARE OF THINGS AT HOME, OR GET ALONG WITH OTHER PEOPLE: 1
2. FEELING DOWN, DEPRESSED OR HOPELESS: 1
4. FEELING TIRED OR HAVING LITTLE ENERGY: 2
7. TROUBLE CONCENTRATING ON THINGS, SUCH AS READING THE NEWSPAPER OR WATCHING TELEVISION: 2
SUM OF ALL RESPONSES TO PHQ9 QUESTIONS 1 & 2: 2
8. MOVING OR SPEAKING SO SLOWLY THAT OTHER PEOPLE COULD HAVE NOTICED. OR THE OPPOSITE, BEING SO FIGETY OR RESTLESS THAT YOU HAVE BEEN MOVING AROUND A LOT MORE THAN USUAL: 1
6. FEELING BAD ABOUT YOURSELF - OR THAT YOU ARE A FAILURE OR HAVE LET YOURSELF OR YOUR FAMILY DOWN: 1
SUM OF ALL RESPONSES TO PHQ QUESTIONS 1-9: 11
SUM OF ALL RESPONSES TO PHQ QUESTIONS 1-9: 11
9. THOUGHTS THAT YOU WOULD BE BETTER OFF DEAD, OR OF HURTING YOURSELF: 0

## 2020-02-05 NOTE — PATIENT INSTRUCTIONS
different part of my experience? 25. Am I blaming myself for something over which I do not have complete control? 19. Thinking Mistakes That Create Stress, Anger, Depression, Anxiety, and Worry    All-or-nothing thinking. You see things in black-and-white categories. It is either one thing or another; there is no room for anything in between. Im 100% healthy or I must have a fatal disease.   Jumping to conclusions. You make a negative interpretation even though there are no definite facts that convincingly support your conclusion. My  is late because he is in a car accident and is injured on the side of the road.   Fortune-telling. You anticipate things will turn out badly, convinced the prediction is a fact. Not getting this job will cause us to lose the house.    Should statements. Musts and oughts are also offenders. Emotional consequences can include anxiety and anger. I should be able to handle this.    Overgeneralization. Assuming one event is actually a pattern. My hand is a little shaky today, I must have Parkinsons Disease.   Disqualifying the positive. Filtering out or rejecting positive experiences to maintain negative beliefs. Upon hearing that your spouse has checked all the doors and windows and they are all locked you think, But someone could cut out a piece of glass and open the window.   Catastrophizing. Predicting the worst possible outcome imaginable. Terrible, awful, horrible, worst ever might be key words. If I cant get my heart to stop pounding Im going to die.      Superstitious thinking. The thought that something you do prevents something awful from happening. Adele Breath my spouse a hug and telling her to be careful before going to work will prevent her from getting in a wreck. I do it every morning and she hasnt gotten in a wreck yet.   Emotional reasoning.  The belief that because you feel a certain way means that the assumptions and associations you have with that feeling are true. The fear, doom, and constant anxiety must mean something is seriously wrong with me.       Automatic Thoughts (Provided by [employer] © 2013)    Our thoughts control how we feel about ourselves and the world around us. Positive thoughts lead to us feeling good and negative thoughts can put us down. Sometimes our thoughts happen so quickly that we fail to notice them, but they can still affect our mood. These are called automatic thoughts. Oftentimes, our automatic thoughts are negative and irrational. Identifying these negative  automatic thoughts and replacing them with new rational thoughts (or counterstatement) can improve our mood. Trigger   Automatic Thought     New Thought                                Example:I made a mistake at work. Im probably going to be fired. I always mess up. This is it. Im no good at this job. \"     I messed up, but mistakes happen. Im going to work through this, like I always do. 

## 2020-02-05 NOTE — PROGRESS NOTES
Behavioral Health Consultation  Hailey Fairchild Psy.D. Psychologist  2/5/20  1:37 PM      Time spent with Patient: 30 minutes  This is patient's second  [facility] appointment. Reason for Consult:  depression and anxiety  Referring Provider: Esdras Pace MD      Feedback given to PCP. S:  Pt reports that his mood has been somewhat better. He reports that work seems to be a bit better. He describes having a good deal of anxious worry about his job although he recognizes this is irrational.  Discussed that he just started Abilify, not sure if it is helping yet. He reports he was feeling better with the Celexa but was having the sexual side effects. Introduced CBT model and discussed use of questions and chart. Pt shared that he feels many of his unhelpful thought patterns are related to his childhood. Pt reports he reviewed information from last session but had no additional thoughts about it.   Pt denies SI and HI.      O:  MSE:  Appearance    alert, cooperative   Personal Hygiene : appropriately dressed and healthy looking  Appetite normal  Sleep disturbance Yes, including: frequent night time awakening  Fatigue Yes  Loss of pleasure Yes  Impulsive behavior No  Speech    spontaneous, normal rate and normal volume  Mood   depressed   Affect    depressed affect  Thought Content    worthlessness and excessive preoccupations  Thought Process    linear, goal directed and coherent  Associations    logical connections  Insight    fair  Judgment    good  Orientation    oriented to person, place, time, and general circumstances  Memory    recent and remote memory intact  Attention/Concentration    intact  Morbid ideation Yes  Suicide Assessment    no suicidal ideation    History:    Medications:   Current Outpatient Medications   Medication Sig Dispense Refill    ARIPiprazole (ABILIFY) 2 MG tablet Take 1 tablet by mouth daily 30 tablet 3    DULoxetine (CYMBALTA) 60 MG extended release capsule TAKE 1 CAPSULE BY MOUTH EVERY DAY 90 capsule 2    testosterone cypionate (DEPOTESTOTERONE CYPIONATE) 200 MG/ML injection Inject 1 mL into the muscle every 14 days for 180 days. 6 mL 1    metoprolol (LOPRESSOR) 100 MG tablet TAKE 1 TABLET BY MOUTH TWICE A  tablet 1    omeprazole (PRILOSEC) 40 MG delayed release capsule TAKE 1 CAPSULE BY MOUTH EVERY DAY 90 capsule 2     No current facility-administered medications for this visit. A:  Administered the PHQ9 which indicates a self report of moderate symptom distress. Pt would benefit from [facility] services to increase coping skills to provide symptom management/control/relief. PHQ Scores 2/5/2020 1/27/2020 3/13/2019 12/18/2018 10/5/2017   PHQ2 Score 2 5 0 0 0   PHQ9 Score 11 20 0 0 0     Interpretation of Total Score Depression Severity: 1-4 = Minimal depression, 5-9 = Mild depression, 10-14 = Moderate depression, 15-19 = Moderately severe depression, 20-27 = Severe depression      Diagnosis:  Major depressive disorder; recurrent and with anxious distress        Plan:  Pt interventions:  Provided handout on  CBT, Trained in strategies for increasing balanced thinking, Provided education, Discussed self-care (sleep, nutrition, rewarding activities, social support, exercise), [place]-setting to identify pt's primary goals for [facility] visit / overall health, Supportive techniques, Emphasized self-care as important for managing overall health, CBT to target negative thoughts and anxious worry and Identified maladaptive thoughts      Pt Behavioral Change Plan:  1. Review the list of unhelpful thinking patterns and pick out any \"go tos\"  2. Pay attention to your body's red flags that you might be engaged in this type of thinking  3. Jump in and apply rational counter statements to increase the balance in your thinking  4.  Return in 2-3 weeks      Please note this report has been partially produced using speech recognition software  And may cause contain errors related to that system including grammar, punctuation and spelling as well as words and phrases that may seem inappropriate. If there are questions or concerns please feel free to contact me to clarify.

## 2020-03-02 ENCOUNTER — OFFICE VISIT (OUTPATIENT)
Dept: BEHAVIORAL/MENTAL HEALTH CLINIC | Age: 35
End: 2020-03-02
Payer: COMMERCIAL

## 2020-03-02 PROCEDURE — 90832 PSYTX W PT 30 MINUTES: CPT | Performed by: PSYCHOLOGIST

## 2020-03-02 ASSESSMENT — PATIENT HEALTH QUESTIONNAIRE - PHQ9
8. MOVING OR SPEAKING SO SLOWLY THAT OTHER PEOPLE COULD HAVE NOTICED. OR THE OPPOSITE, BEING SO FIGETY OR RESTLESS THAT YOU HAVE BEEN MOVING AROUND A LOT MORE THAN USUAL: 1
SUM OF ALL RESPONSES TO PHQ QUESTIONS 1-9: 16
2. FEELING DOWN, DEPRESSED OR HOPELESS: 2
10. IF YOU CHECKED OFF ANY PROBLEMS, HOW DIFFICULT HAVE THESE PROBLEMS MADE IT FOR YOU TO DO YOUR WORK, TAKE CARE OF THINGS AT HOME, OR GET ALONG WITH OTHER PEOPLE: 1
3. TROUBLE FALLING OR STAYING ASLEEP: 3
7. TROUBLE CONCENTRATING ON THINGS, SUCH AS READING THE NEWSPAPER OR WATCHING TELEVISION: 2
1. LITTLE INTEREST OR PLEASURE IN DOING THINGS: 2
9. THOUGHTS THAT YOU WOULD BE BETTER OFF DEAD, OR OF HURTING YOURSELF: 0
SUM OF ALL RESPONSES TO PHQ9 QUESTIONS 1 & 2: 4
6. FEELING BAD ABOUT YOURSELF - OR THAT YOU ARE A FAILURE OR HAVE LET YOURSELF OR YOUR FAMILY DOWN: 2
4. FEELING TIRED OR HAVING LITTLE ENERGY: 3
5. POOR APPETITE OR OVEREATING: 1
SUM OF ALL RESPONSES TO PHQ QUESTIONS 1-9: 16

## 2020-03-02 NOTE — PROGRESS NOTES
capsule 2    testosterone cypionate (DEPOTESTOTERONE CYPIONATE) 200 MG/ML injection Inject 1 mL into the muscle every 14 days for 180 days. 6 mL 1    metoprolol (LOPRESSOR) 100 MG tablet TAKE 1 TABLET BY MOUTH TWICE A  tablet 1    omeprazole (PRILOSEC) 40 MG delayed release capsule TAKE 1 CAPSULE BY MOUTH EVERY DAY 90 capsule 2     No current facility-administered medications for this visit. A:  Administered the PHQ9 which indicates a self report of moderately severe symptom distress. Pt would benefit from [facility] services to increase coping skills to provide symptom management/control/relief. PHQ Scores 3/2/2020 2/5/2020 1/27/2020 3/13/2019 12/18/2018 10/5/2017   PHQ2 Score 4 2 5 0 0 0   PHQ9 Score 16 11 20 0 0 0     Interpretation of Total Score Depression Severity: 1-4 = Minimal depression, 5-9 = Mild depression, 10-14 = Moderate depression, 15-19 = Moderately severe depression, 20-27 = Severe depression      Diagnosis:  Major depressive disorder; recurrent and with anxious distress         Plan:  Pt interventions:  Provided handout on  anxiety, Trained in strategies for increasing balanced thinking, Provided education, Discussed self-care (sleep, nutrition, rewarding activities, social support, exercise), [place]-setting to identify pt's primary goals for [facility] visit / overall health, Supportive techniques, Emphasized self-care as important for managing overall health, CBT to target anxious worry, Identified maladaptive thoughts and Emphasized importance of regular practice of relaxation strategies to target / promote stress management      Pt Behavioral Change Plan:  Continue with PMR nightly  Try worry scheduled worry time   Return in 2 weeks    Please note this report has been partially produced using speech recognition software  And may cause contain errors related to that system including grammar, punctuation and spelling as well as words and phrases that may seem inappropriate.  If there are questions or concerns please feel free to contact me to clarify.

## 2020-03-02 NOTE — PATIENT INSTRUCTIONS
Worry Management   The following strategies may be used to deal with your worries when you feel that they are becoming overwhelming. 1. Worry Place and Time. Set a 30-minute worry period that will take place at the same time and same place each day. Your worry place and time will be:  2. Delay Worry. If you notice you are worrying outside of your scheduled worry time, tell yourself, I have plenty of time to focus on this later. Right now Im just going to be in the moment and notice what Im doing, what others are doing, the environment, and other things I see, hear, or smell.    3. Worry Log. Record all your worries during your worry time on your Worry Log and then take time to categorize these worries. You can choose categories that are helpful for you. You might organize them by Big Concerns, Medium Concerns, and Small Concerns.  Another option would be to categorize them by content area, such as Work Concerns, Family Concerns, Financial Concerns, and Relationship Concerns.  Any means of categorizing can be used; however, it is important not to use too many categories; usually between three and seven work best.     In the next column of your worry log, you can write how you will manage the problem. If the problem is something you have absolutely no control over, you might write down, Im not going to worry about this problem because there is nothing I can do about it right now.     Constructive Worry Instructions    When we have challenges, we tend to use our problem-solving skills to make our lives better and to relieve ourselves of anxiety. It is not surprising that some of us may use our problem-solving skills at the wrong time and place, namely bedtime. We may think about a problem, trying to solve it, but unfortunately this will oftentimes keep us awake. Constructive worry is a method for managing the tendency to worry during that quiet time when sleep is supposed to be taking over.   Do this exercise early in the evening (at least 2 hours before bed). It should take only about 15 minutes to complete. Here is how it is done:  1. Write down the problem(s) facing you that has the greatest chance of keeping you awake a bedtime, and list them in the Concerns column of the [address]. 2. Then, think of the next step that might help fix it. Write it down in the Solutions column. This need not be the final solution to the problem, since most problems have to be solved by taking steps anyhow, and you will be doing this again tomorrow night and the night after until you finally get to the best solution.  If you know how to fix the problem completely, then write that down.  If you decide that this is not really a big problem, and you will just deal with it when the time comes, then write that down.  If you decide that you simply do not know what to do about it, and need to ask someone to help you, write that down.  If you decide that it is a problem, but there seems to be no good solution at all, and that you will just have to live with it, write that down, with a note to yourself that maybe sometime soon you or someone you speak with will give you a clue that will lead you to a solution. 3. Repeat this for any other concerns you have. 4. Fold the Constructive Worry Worksheet in half and place it on the nightstand next to your bed and forget about it until bedtime.     5. At bedtime, if you begin to worry actually tell yourself that you have dealt with your problems already in the best way you know how, and when you were at your problem-solving best.  Remind yourself that you will be working on them again tomorrow evening and that nothing you can do while you are so tired can help you any more than what you have already done; more effort will only make the matters worst.    Constructive Worry Worksheet  [employer]

## 2020-03-16 ENCOUNTER — OFFICE VISIT (OUTPATIENT)
Dept: BEHAVIORAL/MENTAL HEALTH CLINIC | Age: 35
End: 2020-03-16
Payer: COMMERCIAL

## 2020-03-16 ENCOUNTER — TELEPHONE (OUTPATIENT)
Dept: BEHAVIORAL/MENTAL HEALTH CLINIC | Age: 35
End: 2020-03-16

## 2020-03-16 DIAGNOSIS — F41.9 ANXIETY: ICD-10-CM

## 2020-03-16 DIAGNOSIS — G47.9 SLEEP DISTURBANCE: ICD-10-CM

## 2020-03-16 PROCEDURE — 98968 PH1 ASSMT&MGMT NQHP 21-30: CPT | Performed by: PSYCHOLOGIST

## 2020-03-16 ASSESSMENT — PATIENT HEALTH QUESTIONNAIRE - PHQ9
SUM OF ALL RESPONSES TO PHQ QUESTIONS 1-9: 12
SUM OF ALL RESPONSES TO PHQ9 QUESTIONS 1 & 2: 2
3. TROUBLE FALLING OR STAYING ASLEEP: 3
4. FEELING TIRED OR HAVING LITTLE ENERGY: 2
SUM OF ALL RESPONSES TO PHQ QUESTIONS 1-9: 12
1. LITTLE INTEREST OR PLEASURE IN DOING THINGS: 1
5. POOR APPETITE OR OVEREATING: 1
6. FEELING BAD ABOUT YOURSELF - OR THAT YOU ARE A FAILURE OR HAVE LET YOURSELF OR YOUR FAMILY DOWN: 1
9. THOUGHTS THAT YOU WOULD BE BETTER OFF DEAD, OR OF HURTING YOURSELF: 0
7. TROUBLE CONCENTRATING ON THINGS, SUCH AS READING THE NEWSPAPER OR WATCHING TELEVISION: 1
10. IF YOU CHECKED OFF ANY PROBLEMS, HOW DIFFICULT HAVE THESE PROBLEMS MADE IT FOR YOU TO DO YOUR WORK, TAKE CARE OF THINGS AT HOME, OR GET ALONG WITH OTHER PEOPLE: 2
2. FEELING DOWN, DEPRESSED OR HOPELESS: 1
8. MOVING OR SPEAKING SO SLOWLY THAT OTHER PEOPLE COULD HAVE NOTICED. OR THE OPPOSITE, BEING SO FIGETY OR RESTLESS THAT YOU HAVE BEEN MOVING AROUND A LOT MORE THAN USUAL: 2

## 2020-03-16 NOTE — PROGRESS NOTES
more calm om the Abilify. Discussed that he does feel his thinking is foggy at times and he feels he can't get his words out like he would like; notes that this started prior to Abilify. Discussed possible impact of sleep and anxiety on thinking. Discussed sleep hygiene; limit evening \"nap,\" try to incorporate some physical exercise - discussed setting reasonable goals for himself, aiming to do maybe 20-30 minutes to begin. Pt denies SI and HI.    O:  MSE:  Appearance    alert, cooperative   Personal Hygiene : appropriately dressed and healthy looking  Appetite normal  Sleep disturbance Yes, including: frequent night time awakening  Fatigue Yes  Loss of pleasure Yes  Impulsive behavior No  Speech    spontaneous, normal rate and normal volume  Mood   depressed   Affect    depressed affect  Thought Content    worthlessness and excessive preoccupations  Thought Process    linear, goal directed and coherent  Associations    logical connections  Insight    fair  Judgment    good  Orientation    oriented to person, place, time, and general circumstances  Memory    recent and remote memory intact  Attention/Concentration    intact  Morbid ideation Yes  Suicide Assessment    no suicidal ideation    History:    Medications:   Current Outpatient Medications   Medication Sig Dispense Refill    ARIPiprazole (ABILIFY) 2 MG tablet Take 1 tablet by mouth daily 30 tablet 3    DULoxetine (CYMBALTA) 60 MG extended release capsule TAKE 1 CAPSULE BY MOUTH EVERY DAY 90 capsule 2    testosterone cypionate (DEPOTESTOTERONE CYPIONATE) 200 MG/ML injection Inject 1 mL into the muscle every 14 days for 180 days. 6 mL 1    metoprolol (LOPRESSOR) 100 MG tablet TAKE 1 TABLET BY MOUTH TWICE A  tablet 1    omeprazole (PRILOSEC) 40 MG delayed release capsule TAKE 1 CAPSULE BY MOUTH EVERY DAY 90 capsule 2     No current facility-administered medications for this visit.             A:  Administered the PHQ9 which indicates a self

## 2020-03-17 RX ORDER — TRAZODONE HYDROCHLORIDE 50 MG/1
50 TABLET ORAL NIGHTLY
Qty: 30 TABLET | Refills: 2 | Status: SHIPPED | OUTPATIENT
Start: 2020-03-17 | End: 2020-06-29 | Stop reason: SDUPTHER

## 2020-04-22 RX ORDER — ARIPIPRAZOLE 2 MG/1
TABLET ORAL
Qty: 90 TABLET | Refills: 1 | Status: SHIPPED | OUTPATIENT
Start: 2020-04-22 | End: 2020-09-15 | Stop reason: SDUPTHER

## 2020-06-29 RX ORDER — TRAZODONE HYDROCHLORIDE 50 MG/1
50 TABLET ORAL NIGHTLY
Qty: 30 TABLET | Refills: 2 | Status: SHIPPED | OUTPATIENT
Start: 2020-06-29 | End: 2020-09-15

## 2020-07-28 RX ORDER — OMEPRAZOLE 40 MG/1
40 CAPSULE, DELAYED RELEASE ORAL DAILY
Qty: 90 CAPSULE | Refills: 2 | Status: SHIPPED | OUTPATIENT
Start: 2020-07-28 | End: 2020-09-15 | Stop reason: SDUPTHER

## 2020-09-15 ENCOUNTER — OFFICE VISIT (OUTPATIENT)
Dept: FAMILY MEDICINE CLINIC | Age: 35
End: 2020-09-15
Payer: COMMERCIAL

## 2020-09-15 VITALS
BODY MASS INDEX: 38.34 KG/M2 | TEMPERATURE: 96.6 F | HEIGHT: 68 IN | DIASTOLIC BLOOD PRESSURE: 78 MMHG | WEIGHT: 253 LBS | HEART RATE: 69 BPM | OXYGEN SATURATION: 99 % | SYSTOLIC BLOOD PRESSURE: 106 MMHG

## 2020-09-15 PROCEDURE — 99214 OFFICE O/P EST MOD 30 MIN: CPT | Performed by: FAMILY MEDICINE

## 2020-09-15 RX ORDER — METOPROLOL TARTRATE 100 MG/1
TABLET ORAL
Qty: 180 TABLET | Refills: 1 | Status: SHIPPED | OUTPATIENT
Start: 2020-09-15 | End: 2021-03-08

## 2020-09-15 RX ORDER — OMEPRAZOLE 40 MG/1
40 CAPSULE, DELAYED RELEASE ORAL DAILY
Qty: 90 CAPSULE | Refills: 2 | Status: SHIPPED | OUTPATIENT
Start: 2020-09-15 | End: 2021-05-05

## 2020-09-15 RX ORDER — ONDANSETRON 4 MG/1
4 TABLET, ORALLY DISINTEGRATING ORAL 3 TIMES DAILY PRN
Qty: 21 TABLET | Refills: 0 | Status: SHIPPED | OUTPATIENT
Start: 2020-09-15 | End: 2020-10-29

## 2020-09-15 RX ORDER — TESTOSTERONE CYPIONATE 200 MG/ML
200 INJECTION INTRAMUSCULAR
Qty: 6 ML | Refills: 1 | Status: SHIPPED | OUTPATIENT
Start: 2020-09-15 | End: 2021-07-08

## 2020-09-15 RX ORDER — TADALAFIL 5 MG/1
5 TABLET ORAL DAILY
Qty: 30 TABLET | Refills: 5 | Status: SHIPPED | OUTPATIENT
Start: 2020-09-15 | End: 2020-11-13 | Stop reason: SDUPTHER

## 2020-09-15 RX ORDER — DULOXETIN HYDROCHLORIDE 60 MG/1
CAPSULE, DELAYED RELEASE ORAL
Qty: 90 CAPSULE | Refills: 2 | Status: SHIPPED | OUTPATIENT
Start: 2020-09-15 | End: 2020-11-10 | Stop reason: SDUPTHER

## 2020-09-15 RX ORDER — ARIPIPRAZOLE 2 MG/1
TABLET ORAL
Qty: 90 TABLET | Refills: 1 | Status: SHIPPED | OUTPATIENT
Start: 2020-09-15 | End: 2021-01-04

## 2020-09-15 NOTE — PROGRESS NOTES
Chief Complaint   Patient presents with    Annual Exam     Pt stated is here for annual check-up    Nausea     Pt stated that for the past month he has been getting sick, nauseous in the morning, wondering if it is because of one of his medications        HPI:  Jonathan Romo is a 38 y.o. male    cymbalta and abilify are helping his mood, but has been experiencing nausea for the past month, every morning  Is taking prilosec    Hx HTN, lipids, hypogonadism    Remains on testosterone shots          Wt Readings from Last 3 Encounters:   09/15/20 253 lb (114.8 kg)   01/20/20 244 lb 6.4 oz (110.9 kg)   10/01/19 250 lb (113.4 kg)         Past Medical History:   Diagnosis Date    Asthma     Dermatitis     Fatty liver     Gastroesophageal reflux disease with hiatal hernia     Hypertension     Obesity (BMI 30-39. 9)      Past Surgical History:   Procedure Laterality Date    LYMPH NODE BIOPSY      surgery due to cat scratch disease age 1   [place] WISDOM TOOTH EXTRACTION       No family history on file.   Social History     Socioeconomic History    Marital status:      Spouse name: None    Number of children: 1    Years of education: None    Highest education level: None   Occupational History    None   Social Needs    Financial resource strain: None    Food insecurity     Worry: None     Inability: None    Transportation needs     Medical: None     Non-medical: None   Tobacco Use    Smoking status: Never Smoker    Smokeless tobacco: Never Used   Substance and Sexual Activity    Alcohol use: No     Alcohol/week: 0.0 standard drinks    Drug use: No    Sexual activity: None   Lifestyle    Physical activity     Days per week: None     Minutes per session: None    Stress: None   Relationships    Social connections     Talks on phone: None     Gets together: None     Attends Buddhism service: None     Active member of club or organization: None     Attends meetings of clubs or organizations: None Relationship status: None    Intimate partner violence     Fear of current or ex partner: None     Emotionally abused: None     Physically abused: None     Forced sexual activity: None   Other Topics Concern    None   Social History Narrative    None     Current Outpatient Medications   Medication Sig Dispense Refill    ARIPiprazole (ABILIFY) 2 MG tablet TAKE 1 TABLET BY MOUTH EVERY DAY 90 tablet 1    DULoxetine (CYMBALTA) 60 MG extended release capsule TAKE 1 CAPSULE BY MOUTH EVERY DAY 90 capsule 2    metoprolol (LOPRESSOR) 100 MG tablet TAKE 1 TABLET BY MOUTH TWICE A  tablet 1    omeprazole (PRILOSEC) 40 MG delayed release capsule Take 1 capsule by mouth daily 90 capsule 2    testosterone cypionate (DEPOTESTOTERONE CYPIONATE) 200 MG/ML injection Inject 1 mL into the muscle every 14 days for 180 days. 6 mL 1    tadalafil (CIALIS) 5 MG tablet Take 1 tablet by mouth daily 30 tablet 5    ondansetron (ZOFRAN-ODT) 4 MG disintegrating tablet Take 1 tablet by mouth 3 times daily as needed for Nausea or Vomiting 21 tablet 0     No current facility-administered medications for this visit. Allergies   Allergen Reactions    Amlodipine Swelling    Compazine [Prochlorperazine Maleate] Swelling    Lisinopril Hives and Swelling     Skin tightens on his face and gets hives       Review of Systems:   General ROS: no f/c/n/v  Respiratory ROS: no cough, shortness of breath, or wheezing  Cardiovascular ROS: no chest pain or dyspnea on exertion  Gastrointestinal ROS: no abdominal pain, change in bowel habits, or black or bloody stools  Genito-Urinary ROS: per HPI  Musculoskeletal ROS: negative for - gait disturbance, joint pain or joint stiffness  Neurological ROS: negative for - behavioral changes, memory loss, numbness/tingling, tremors or weakness    In general patient otherwise reports feeling well.      Physical Exam:  /78 (Site: Right Upper Arm, Position: Sitting, Cuff Size: Medium Adult)   Pulse 69 Temp 96.6 °F (35.9 °C) (Infrared)   Ht 5' 8\" (1.727 m)   Wt 253 lb (114.8 kg)   SpO2 99%   BMI 38.47 kg/m²     Gen: Well, NAD, Alert, Oriented x 3   HEENT: EOMI, eyes clear, MMM  Skin: raised lesion on back, inflamed  Lungs: CTA B w/out Rales/Wheezes/Rhonchi, Good respiratory effort   Heart: RRR, S1S2, w/out M/R/G, non-displaced PMI   Psych:dysthymic  Neuro: Neurovascularly intact w/ Sensory/Motor intact UE/LE Bilaterally. Lab Results   Component Value Date    WBC 9.2 08/07/2019    HGB 17.3 08/07/2019    HCT 49.4 08/07/2019     08/07/2019    CHOL 236 (H) 08/07/2019    TRIG 186 (H) 08/07/2019    HDL 39 (L) 08/07/2019    ALT 93 (H) 08/07/2019    AST 47 (H) 08/07/2019     08/07/2019    K 4.4 08/07/2019     08/07/2019    CREATININE 0.87 08/07/2019    BUN 14 08/07/2019    CO2 24 08/07/2019    TSH 3.130 08/07/2019    PSA 0.54 08/07/2019    LABA1C 5.8 08/07/2019         A&P   Diagnosis Orders   1. Erectile dysfunction, unspecified erectile dysfunction type  tadalafil (CIALIS) 5 MG tablet   2. Polyarthralgia  DULoxetine (CYMBALTA) 60 MG extended release capsule   3. Essential hypertension  metoprolol (LOPRESSOR) 100 MG tablet    Comprehensive Metabolic Panel    Lipid Panel   4. Gastroesophageal reflux disease with hiatal hernia  omeprazole (PRILOSEC) 40 MG delayed release capsule    CBC   5. Hypogonadism in male  testosterone cypionate (DEPOTESTOTERONE CYPIONATE) 200 MG/ML injection    Testosterone Male   6. Anxiety and depression  ARIPiprazole (ABILIFY) 2 MG tablet    TSH without Reflex    Vitamin D 25 Hydroxy   7.  Nausea  ondansetron (ZOFRAN-ODT) 4 MG disintegrating tablet     Continue current meds  Check labs  cialis prn  meds playing a role    Try changing meds to evening with meal    zofran prn    Didn't want to cut down meds at this time, because mood has been good        Connor Ramiro, MD

## 2020-09-23 RX ORDER — TRAZODONE HYDROCHLORIDE 50 MG/1
50 TABLET ORAL NIGHTLY
Qty: 30 TABLET | Refills: 2 | Status: SHIPPED | OUTPATIENT
Start: 2020-09-23 | End: 2021-02-08 | Stop reason: ALTCHOICE

## 2020-09-26 ENCOUNTER — HOSPITAL ENCOUNTER (EMERGENCY)
Age: 35
Discharge: HOME | End: 2020-09-26
Attending: STUDENT IN AN ORGANIZED HEALTH CARE EDUCATION/TRAINING PROGRAM
Payer: COMMERCIAL

## 2020-09-26 VITALS
HEART RATE: 91 BPM | TEMPERATURE: 97.9 F | OXYGEN SATURATION: 97 % | WEIGHT: 256 LBS | SYSTOLIC BLOOD PRESSURE: 127 MMHG | DIASTOLIC BLOOD PRESSURE: 76 MMHG | RESPIRATION RATE: 24 BRPM | BODY MASS INDEX: 38.92 KG/M2

## 2020-09-26 PROCEDURE — 6360000002 HC RX W HCPCS: Performed by: STUDENT IN AN ORGANIZED HEALTH CARE EDUCATION/TRAINING PROGRAM

## 2020-09-26 PROCEDURE — 96375 TX/PRO/DX INJ NEW DRUG ADDON: CPT

## 2020-09-26 PROCEDURE — 96361 HYDRATE IV INFUSION ADD-ON: CPT

## 2020-09-26 PROCEDURE — 2500000003 HC RX 250 WO HCPCS: Performed by: STUDENT IN AN ORGANIZED HEALTH CARE EDUCATION/TRAINING PROGRAM

## 2020-09-26 PROCEDURE — 96374 THER/PROPH/DIAG INJ IV PUSH: CPT

## 2020-09-26 PROCEDURE — 2580000003 HC RX 258: Performed by: STUDENT IN AN ORGANIZED HEALTH CARE EDUCATION/TRAINING PROGRAM

## 2020-09-26 PROCEDURE — 99283 EMERGENCY DEPT VISIT LOW MDM: CPT

## 2020-09-26 RX ORDER — FAMOTIDINE 20 MG/1
20 TABLET, FILM COATED ORAL 2 TIMES DAILY
Qty: 10 TABLET | Refills: 0 | Status: SHIPPED | OUTPATIENT
Start: 2020-09-26 | End: 2020-11-10 | Stop reason: ALTCHOICE

## 2020-09-26 RX ORDER — ACETAMINOPHEN 500 MG
1000 TABLET ORAL EVERY 6 HOURS PRN
Qty: 120 TABLET | Refills: 0 | Status: SHIPPED | OUTPATIENT
Start: 2020-09-26 | End: 2020-11-10 | Stop reason: ALTCHOICE

## 2020-09-26 RX ORDER — 0.9 % SODIUM CHLORIDE 0.9 %
1000 INTRAVENOUS SOLUTION INTRAVENOUS ONCE
Status: COMPLETED | OUTPATIENT
Start: 2020-09-26 | End: 2020-09-26

## 2020-09-26 RX ORDER — EPINEPHRINE 0.3 MG/.3ML
0.3 INJECTION SUBCUTANEOUS
Qty: 1 EACH | Refills: 0 | Status: SHIPPED | OUTPATIENT
Start: 2020-09-26 | End: 2022-01-17

## 2020-09-26 RX ORDER — DIPHENHYDRAMINE HCL 25 MG
25 CAPSULE ORAL EVERY 6 HOURS PRN
Qty: 20 CAPSULE | Refills: 0 | Status: SHIPPED | OUTPATIENT
Start: 2020-09-26 | End: 2020-10-01

## 2020-09-26 RX ORDER — DEXAMETHASONE SODIUM PHOSPHATE 4 MG/ML
10 INJECTION, SOLUTION INTRA-ARTICULAR; INTRALESIONAL; INTRAMUSCULAR; INTRAVENOUS; SOFT TISSUE ONCE
Status: COMPLETED | OUTPATIENT
Start: 2020-09-26 | End: 2020-09-26

## 2020-09-26 RX ORDER — DIPHENHYDRAMINE HYDROCHLORIDE 50 MG/ML
50 INJECTION INTRAMUSCULAR; INTRAVENOUS ONCE
Status: COMPLETED | OUTPATIENT
Start: 2020-09-26 | End: 2020-09-26

## 2020-09-26 RX ORDER — PREDNISONE 50 MG/1
50 TABLET ORAL DAILY
Qty: 5 TABLET | Refills: 0 | Status: SHIPPED | OUTPATIENT
Start: 2020-09-26 | End: 2020-10-01

## 2020-09-26 RX ADMIN — DEXAMETHASONE SODIUM PHOSPHATE 10 MG: 4 INJECTION, SOLUTION INTRAMUSCULAR; INTRAVENOUS at 20:25

## 2020-09-26 RX ADMIN — DIPHENHYDRAMINE HYDROCHLORIDE 50 MG: 50 INJECTION, SOLUTION INTRAMUSCULAR; INTRAVENOUS at 20:26

## 2020-09-26 RX ADMIN — SODIUM CHLORIDE 1000 ML: 9 INJECTION, SOLUTION INTRAVENOUS at 20:31

## 2020-09-26 RX ADMIN — FAMOTIDINE 20 MG: 10 INJECTION INTRAVENOUS at 20:25

## 2020-09-26 ASSESSMENT — ENCOUNTER SYMPTOMS
SHORTNESS OF BREATH: 0
BACK PAIN: 0
TROUBLE SWALLOWING: 0
DIARRHEA: 0
FACIAL SWELLING: 1
COUGH: 0
VOMITING: 0
CHEST TIGHTNESS: 0
SINUS PRESSURE: 0
ABDOMINAL PAIN: 0

## 2020-09-27 NOTE — ED TRIAGE NOTES
Patient reports that he was stung by 15 to 20 bees. Patient noted to hives throughout his body. Patient also reports edema to his lips. No oral edema noted and patient denies respiratory difficulties.  Patient took two over the counter Benadryl prior to arrival.

## 2020-09-27 NOTE — ED PROVIDER NOTES
[facility] ED  eMERGENCY dEPARTMENT eNCOUnter      Pt Name: Irma Major  MRN: [id number]  [place] 1985  Date of evaluation: 9/26/2020  Provider: Governor La Pine,     CHIEF COMPLAINT       Chief Complaint   Patient presents with    Allergic Reaction     Stung by 13 to 25 honey bees         HISTORY OF PRESENT ILLNESS   (Location/Symptom, Timing/Onset,Context/Setting, Quality, Duration, Modifying Factors, Severity)  Note limiting factors. Irma Major is a 38 y.o. male who presents to the emergency department with complaint of 15-20 bee stings. Patient has bee hives at home and states that his lip started to swell and he got hives all over his body. Patient denies any shortness of breath. Patient took 2 over-the-counter Benadryl's prior to arrival.  Patient's wife is present helps contribute to history. Patient states that he has been stung by bees before and never had any reaction. He states that he was not expecting to have a rash all over that is itchy. Patient states that the Benadryl just helps slightly and that he was concerned that that allergy is going to get worse. The patient's wife is present. I have the patient's permission to interview, examined him, and discussed his care with his wife present. The history is provided by the patient and the spouse. NursingNotes were reviewed. REVIEW OF SYSTEMS    (2-9 systems for level 4, 10 or more for level 5)     Review of Systems   Constitutional: Negative for activity change, appetite change, chills, fever and unexpected weight change. HENT: Positive for facial swelling ( Lips). Negative for drooling, ear pain, nosebleeds, sinus pressure and trouble swallowing. Respiratory: Negative for cough, chest tightness and shortness of breath. Cardiovascular: Negative for chest pain and leg swelling. Gastrointestinal: Negative for abdominal pain, diarrhea and vomiting.    Endocrine: Negative for polydipsia and polyphagia. Genitourinary: Negative for dysuria, flank pain and frequency. Musculoskeletal: Negative for back pain and myalgias. Skin: Positive for rash ( Hives). Negative for pallor. Neurological: Negative for syncope, weakness and headaches. Hematological: Does not bruise/bleed easily. All other systems reviewed and are negative. Except as noted above the remainder of the review of systems was reviewed and negative. PAST MEDICAL HISTORY     Past Medical History:   Diagnosis Date    Asthma     Dermatitis     Fatty liver     Gastroesophageal reflux disease with hiatal hernia     Hypertension     Obesity (BMI 30-39. 9)          SURGICALHISTORY       Past Surgical History:   Procedure Laterality Date    LYMPH NODE BIOPSY      surgery due to cat scratch disease age 1   [place] WISDOM TOOTH EXTRACTION           CURRENT MEDICATIONS       Previous Medications    ARIPIPRAZOLE (ABILIFY) 2 MG TABLET    TAKE 1 TABLET BY MOUTH EVERY DAY    DULOXETINE (CYMBALTA) 60 MG EXTENDED RELEASE CAPSULE    TAKE 1 CAPSULE BY MOUTH EVERY DAY    METOPROLOL (LOPRESSOR) 100 MG TABLET    TAKE 1 TABLET BY MOUTH TWICE A DAY    OMEPRAZOLE (PRILOSEC) 40 MG DELAYED RELEASE CAPSULE    Take 1 capsule by mouth daily    ONDANSETRON (ZOFRAN-ODT) 4 MG DISINTEGRATING TABLET    Take 1 tablet by mouth 3 times daily as needed for Nausea or Vomiting    TADALAFIL (CIALIS) 5 MG TABLET    Take 1 tablet by mouth daily    TESTOSTERONE CYPIONATE (DEPOTESTOTERONE CYPIONATE) 200 MG/ML INJECTION    Inject 1 mL into the muscle every 14 days for 180 days. TRAZODONE (DESYREL) 50 MG TABLET    Take 1 tablet by mouth nightly       ALLERGIES     Amlodipine; Compazine [prochlorperazine maleate]; and Lisinopril    FAMILY HISTORY     No family history on file.        SOCIAL HISTORY       Social History     Socioeconomic History    Marital status:      Spouse name: Not on file    Number of children: 1    Years of education: Not on file    Highest education level: Not on file   Occupational History    Not on file   Social Needs    Financial resource strain: Not on file    Food insecurity     Worry: Not on file     Inability: Not on file    Transportation needs     Medical: Not on file     Non-medical: Not on file   Tobacco Use    Smoking status: Never Smoker    Smokeless tobacco: Never Used   Substance and Sexual Activity    Alcohol use: No     Alcohol/week: 0.0 standard drinks    Drug use: No    Sexual activity: Not on file   Lifestyle    Physical activity     Days per week: Not on file     Minutes per session: Not on file    Stress: Not on file   Relationships    Social connections     Talks on phone: Not on file     Gets together: Not on file     Attends Mosque service: Not on file     Active member of club or organization: Not on file     Attends meetings of clubs or organizations: Not on file     Relationship status: Not on file    Intimate partner violence     Fear of current or ex partner: Not on file     Emotionally abused: Not on file     Physically abused: Not on file     Forced sexual activity: Not on file   Other Topics Concern    Not on file   Social History Narrative    Not on file       SCREENINGS      @NDVJ([id number])@      PHYSICAL EXAM    (up to 7 for level 4, 8 or more for level 5)     ED Triage Vitals   BP Temp Temp Source Pulse Resp SpO2 Height Weight   09/26/20 2014 09/26/20 2014 09/26/20 2014 09/26/20 2012 09/26/20 2012 09/26/20 2012 -- 09/26/20 2012   (!) 151/111 97.9 °F (36.6 °C) Oral 126 22 100 %  256 lb (116.1 kg)       Physical Exam  Vitals signs and nursing note reviewed. Constitutional:       General: He is awake. He is in acute distress. Appearance: Normal appearance. He is well-developed and normal weight. He is not ill-appearing, toxic-appearing or diaphoretic. Interventions: He is not intubated. Comments: No photophobia. No phonophobia. HENT:      Head: Normocephalic and atraumatic. warm and dry. Capillary Refill: Capillary refill takes less than 2 seconds. Coloration: Skin is not jaundiced or pale. Findings: Rash present. No bruising, erythema or lesion. Rash is urticarial.          Neurological:      General: No focal deficit present. Mental Status: He is alert and oriented to person, place, and time. Mental status is at baseline. Cranial Nerves: No cranial nerve deficit. Sensory: No sensory deficit. Motor: No weakness. Coordination: Coordination normal.      Deep Tendon Reflexes: Reflexes are normal and symmetric. Psychiatric:         Mood and Affect: Mood normal.         Behavior: Behavior normal. Behavior is cooperative. Thought Content: Thought content normal.         Judgment: Judgment normal.         DIAGNOSTIC RESULTS     EKG: All EKG's are interpreted by the Emergency Department Physician who either signs or Co-signsthis chart in the absence of a cardiologist.        RADIOLOGY:   Viviann Frees such as CT, Ultrasound and MRI are read by the radiologist. Plain radiographic images are visualized and preliminarily interpreted by the emergency physician with the below findings:        Interpretation per the Radiologist below, if available at the time ofthis note:    No orders to display         ED BEDSIDE ULTRASOUND:   Performed by ED Physician - none    LABS:  Labs Reviewed - No data to display    All other labs were within normal range or not returned as of this dictation. EMERGENCY DEPARTMENT COURSE and DIFFERENTIAL DIAGNOSIS/MDM:   Vitals:    Vitals:    09/26/20 2012 09/26/20 2014 09/26/20 2031 09/26/20 2100   BP:  (!) 151/111 (!) 142/87 127/76   Pulse: 126  98 91   Resp: 22  18 24   Temp:  97.9 °F (36.6 °C)     TempSrc:  Oral     SpO2: 100%  98% 97%   Weight: 256 lb (116.1 kg)              MDM  IV was placed. Patient was given IV steroid, IV Pepcid, and IV Benadryl. On reexam patient's hives are gone.   Patient states he feels completely relieved. Because of rebound allergic reaction up to 18 hours later patient be written a prescription for Benadryl, Pepcid and prednisone. Tomorrow morning he is to take another dose of medicines as well as at 5 PM.  The findings were discussed with the patient and his wife. He was prescribed a an EpiPen. Patient advised that if he has to use this he needs call 911. Patient is wife verbalized understanding the care was discussed with him they have no further questions. CONSULTS:  None    PROCEDURES:  Unless otherwise noted below, none     Procedures    FINAL IMPRESSION      1.  Allergic reaction to bee sting          DISPOSITION/PLAN   DISPOSITION Decision To Discharge 09/26/2020 09:51:24 PM      PATIENT REFERRED TO:  Bess Barba MD  [address]  [phone]    Call in 3 days        DISCHARGE MEDICATIONS:  New Prescriptions    ACETAMINOPHEN (APAP EXTRA STRENGTH) 500 MG TABLET    Take 2 tablets by mouth every 6 hours as needed for Pain    DIPHENHYDRAMINE (BENADRYL) 25 MG CAPSULE    Take 1 capsule by mouth every 6 hours as needed for Itching    EPINEPHRINE (EPIPEN 2-AUGUSTUS) 0.3 MG/0.3ML SOAJ INJECTION    Inject 0.3 mLs into the muscle once as needed (allergic reaction/bee sting) Use as directed for allergic reaction  Dispense 1 two pack (epi pen)    FAMOTIDINE (PEPCID) 20 MG TABLET    Take 1 tablet by mouth 2 times daily for 5 days    PREDNISONE (DELTASONE) 50 MG TABLET    Take 1 tablet by mouth daily for 5 days          (Please note that portions of this note were completed with a voice recognition program.  Efforts were made to edit the dictations but occasionally words are mis-transcribed.)    Dino Caldera DO (electronically signed)  Attending Emergency Physician          Dino Caldera DO  09/26/20 0415

## 2020-10-06 DIAGNOSIS — F41.9 ANXIETY AND DEPRESSION: ICD-10-CM

## 2020-10-06 DIAGNOSIS — E29.1 HYPOGONADISM IN MALE: ICD-10-CM

## 2020-10-06 DIAGNOSIS — F32.A ANXIETY AND DEPRESSION: ICD-10-CM

## 2020-10-06 DIAGNOSIS — K44.9 GASTROESOPHAGEAL REFLUX DISEASE WITH HIATAL HERNIA: ICD-10-CM

## 2020-10-06 DIAGNOSIS — I10 ESSENTIAL HYPERTENSION: ICD-10-CM

## 2020-10-06 DIAGNOSIS — K21.9 GASTROESOPHAGEAL REFLUX DISEASE WITH HIATAL HERNIA: ICD-10-CM

## 2020-10-06 LAB
ALBUMIN SERPL-MCNC: 4.3 G/DL (ref 3.5–4.6)
ALP BLD-CCNC: 52 U/L (ref 35–104)
ALT SERPL-CCNC: 58 U/L (ref 0–41)
ANION GAP SERPL CALCULATED.3IONS-SCNC: 12 MEQ/L (ref 9–15)
AST SERPL-CCNC: 27 U/L (ref 0–40)
BILIRUB SERPL-MCNC: 0.6 MG/DL (ref 0.2–0.7)
BUN BLDV-MCNC: 14 MG/DL (ref 6–20)
CALCIUM SERPL-MCNC: 9.4 MG/DL (ref 8.5–9.9)
CHLORIDE BLD-SCNC: 103 MEQ/L (ref 95–107)
CHOLESTEROL, TOTAL: 213 MG/DL (ref 0–199)
CO2: 23 MEQ/L (ref 20–31)
CREAT SERPL-MCNC: 0.74 MG/DL (ref 0.7–1.2)
GFR AFRICAN AMERICAN: >60
GFR NON-AFRICAN AMERICAN: >60
GLOBULIN: 2.6 G/DL (ref 2.3–3.5)
GLUCOSE BLD-MCNC: 98 MG/DL (ref 70–99)
HCT VFR BLD CALC: 53.2 % (ref 42–52)
HDLC SERPL-MCNC: 54 MG/DL (ref 40–59)
HEMOGLOBIN: 17.5 G/DL (ref 14–18)
LDL CHOLESTEROL CALCULATED: 131 MG/DL (ref 0–129)
MCH RBC QN AUTO: 30.5 PG (ref 27–31.3)
MCHC RBC AUTO-ENTMCNC: 33 % (ref 33–37)
MCV RBC AUTO: 92.5 FL (ref 80–100)
PDW BLD-RTO: 14.4 % (ref 11.5–14.5)
PLATELET # BLD: 301 K/UL (ref 130–400)
POTASSIUM SERPL-SCNC: 5 MEQ/L (ref 3.4–4.9)
RBC # BLD: 5.75 M/UL (ref 4.7–6.1)
SODIUM BLD-SCNC: 138 MEQ/L (ref 135–144)
TOTAL PROTEIN: 6.9 G/DL (ref 6.3–8)
TRIGL SERPL-MCNC: 139 MG/DL (ref 0–150)
TSH SERPL DL<=0.05 MIU/L-ACNC: 2.14 UIU/ML (ref 0.44–3.86)
VITAMIN D 25-HYDROXY: 22.7 NG/ML (ref 30–100)
WBC # BLD: 9.1 K/UL (ref 4.8–10.8)

## 2020-10-08 LAB — TESTOSTERONE TOTAL-MALE: 136 NG/DL (ref 300–1080)

## 2020-10-16 DIAGNOSIS — E29.1 HYPOGONADISM IN MALE: ICD-10-CM

## 2020-10-19 LAB — TESTOSTERONE TOTAL-MALE: 630 NG/DL (ref 300–1080)

## 2020-10-29 RX ORDER — ONDANSETRON 4 MG/1
4 TABLET, ORALLY DISINTEGRATING ORAL 3 TIMES DAILY PRN
Qty: 12 TABLET | Refills: 1 | Status: SHIPPED | OUTPATIENT
Start: 2020-10-29 | End: 2020-11-10 | Stop reason: ALTCHOICE

## 2020-11-10 ENCOUNTER — OFFICE VISIT (OUTPATIENT)
Dept: FAMILY MEDICINE CLINIC | Age: 35
End: 2020-11-10
Payer: COMMERCIAL

## 2020-11-10 VITALS
SYSTOLIC BLOOD PRESSURE: 112 MMHG | HEART RATE: 78 BPM | WEIGHT: 257 LBS | OXYGEN SATURATION: 97 % | TEMPERATURE: 96.8 F | BODY MASS INDEX: 38.95 KG/M2 | HEIGHT: 68 IN | DIASTOLIC BLOOD PRESSURE: 78 MMHG

## 2020-11-10 PROCEDURE — 90471 IMMUNIZATION ADMIN: CPT | Performed by: FAMILY MEDICINE

## 2020-11-10 PROCEDURE — 99213 OFFICE O/P EST LOW 20 MIN: CPT | Performed by: FAMILY MEDICINE

## 2020-11-10 PROCEDURE — 90688 IIV4 VACCINE SPLT 0.5 ML IM: CPT | Performed by: FAMILY MEDICINE

## 2020-11-10 RX ORDER — DULOXETIN HYDROCHLORIDE 60 MG/1
CAPSULE, DELAYED RELEASE ORAL
Qty: 90 CAPSULE | Refills: 2 | Status: SHIPPED | OUTPATIENT
Start: 2020-11-10 | End: 2020-11-13 | Stop reason: SDUPTHER

## 2020-11-10 NOTE — PROGRESS NOTES
After obtaining consent, and per orders of Dr. Juan Luis Becker, injection of Flu given in Right deltoid by Daisha Mitchell. Patient instructed to remain in clinic for 20 minutes afterwards, and to report any adverse reaction to me immediately. Vaccine Information Sheet, \"Influenza - Inactivated\"  given to Topher Anderson, or parent/legal guardian of  Topher Anderson and verbalized understanding. Patient responses:    Have you ever had a reaction to a flu vaccine? No  Are you able to eat eggs without adverse effects? Yes  Do you have any current illness? No  Have you ever had Guillian Kirwin Syndrome? No    Flu vaccine given per order. Please see immunization tab.
status: None    Intimate partner violence     Fear of current or ex partner: None     Emotionally abused: None     Physically abused: None     Forced sexual activity: None   Other Topics Concern    None   Social History Narrative    None     Current Outpatient Medications   Medication Sig Dispense Refill    DULoxetine (CYMBALTA) 60 MG extended release capsule TAKE 1 CAPSULE BY MOUTH EVERY DAY 90 capsule 2    cariprazine hcl (VRAYLAR) 3 MG CAPS capsule Take 1 capsule by mouth daily 30 capsule 3    EPINEPHrine (EPIPEN 2-AUGUSTUS) 0.3 MG/0.3ML SOAJ injection Inject 0.3 mLs into the muscle once as needed (allergic reaction/bee sting) Use as directed for allergic reaction  Dispense 1 two pack (epi pen) 1 each 0    traZODone (DESYREL) 50 MG tablet Take 1 tablet by mouth nightly 30 tablet 2    ARIPiprazole (ABILIFY) 2 MG tablet TAKE 1 TABLET BY MOUTH EVERY DAY 90 tablet 1    metoprolol (LOPRESSOR) 100 MG tablet TAKE 1 TABLET BY MOUTH TWICE A  tablet 1    omeprazole (PRILOSEC) 40 MG delayed release capsule Take 1 capsule by mouth daily 90 capsule 2    testosterone cypionate (DEPOTESTOTERONE CYPIONATE) 200 MG/ML injection Inject 1 mL into the muscle every 14 days for 180 days. 6 mL 1    tadalafil (CIALIS) 5 MG tablet Take 1 tablet by mouth daily 30 tablet 5     No current facility-administered medications for this visit.       Allergies   Allergen Reactions    Amlodipine Swelling    Compazine [Prochlorperazine Maleate] Swelling    Lisinopril Hives and Swelling     Skin tightens on his face and gets hives       Review of Systems:   General ROS: no f/c/n/v  Respiratory ROS: no cough, shortness of breath, or wheezing  Cardiovascular ROS: no chest pain or dyspnea on exertion  Gastrointestinal ROS: no abdominal pain, change in bowel habits, or black or bloody stools  Genito-Urinary ROS: per HPI  Musculoskeletal ROS: negative for - gait disturbance, joint pain or joint stiffness  Neurological ROS: negative for -

## 2020-11-11 ENCOUNTER — VIRTUAL VISIT (OUTPATIENT)
Dept: FAMILY MEDICINE CLINIC | Age: 35
End: 2020-11-11
Payer: COMMERCIAL

## 2020-11-11 PROCEDURE — 99213 OFFICE O/P EST LOW 20 MIN: CPT | Performed by: NURSE PRACTITIONER

## 2020-11-11 ASSESSMENT — ENCOUNTER SYMPTOMS
SINUS PRESSURE: 1
COUGH: 1
SHORTNESS OF BREATH: 0

## 2020-11-11 NOTE — PROGRESS NOTES
2020    TELEHEALTH EVALUATION -- Audio/Visual (During SKKIQ-87 public health emergency)    Due to COVID 19 outbreak, patient's office visit was converted to a virtual visit. Patient was contacted and agreed to proceed with a virtual visit via [email or web address]  The risks and benefits of converting to a virtual visit were discussed in light of the current infectious disease epidemic. Patient also understood that insurance coverage and co-pays are up to their individual insurance plans. HPI:    Kiana Oneil (:  1985) has requested an audio/video evaluation for the following concern(s):    Today- chills, HA, cold sweat. No fever. Dry cough  No sore throat. No change in taste or smell. Extremely fatigued. Ibuprofen. Not helping  Probable covid exposure. Review of Systems   Constitutional: Positive for chills, fatigue and fever. HENT: Positive for congestion and sinus pressure. Respiratory: Positive for cough. Negative for shortness of breath. Cardiovascular: Negative for chest pain. Prior to Visit Medications    Medication Sig Taking? Authorizing Provider   DULoxetine (CYMBALTA) 60 MG extended release capsule TAKE 1 CAPSULE BY MOUTH EVERY DAY Yes Jeffrey Guzman MD   cariprazine hcl (VRAYLAR) 3 MG CAPS capsule Take 1 capsule by mouth daily Yes Jeffrey Guzman MD   traZODone (DESYREL) 50 MG tablet Take 1 tablet by mouth nightly Yes Jeffrey Guzman MD   ARIPiprazole (ABILIFY) 2 MG tablet TAKE 1 TABLET BY MOUTH EVERY DAY Yes Jeffrey Guzman MD   metoprolol (LOPRESSOR) 100 MG tablet TAKE 1 TABLET BY MOUTH TWICE A DAY Yes Jeffrey Guzman MD   omeprazole (PRILOSEC) 40 MG delayed release capsule Take 1 capsule by mouth daily Yes Jeffrey Guzman MD   testosterone cypionate (DEPOTESTOTERONE CYPIONATE) 200 MG/ML injection Inject 1 mL into the muscle every 14 days for 180 days.  Yes Jeffrey Guzman MD   tadalafil (CIALIS) 5 MG tablet Take 1 tablet by mouth daily Yes Jeffrey Guzman MD   EPINEPHrine (EPIPEN 2-AUGUSTUS) 0.3 MG/0.3ML SOAJ injection Inject 0.3 mLs into the muscle once as needed (allergic reaction/bee sting) Use as directed for allergic reaction  Dispense 1 two pack (epi pen)  Azeba Plate DO Terese       Social History     Tobacco Use    Smoking status: Never Smoker    Smokeless tobacco: Never Used   Substance Use Topics    Alcohol use: No     Alcohol/week: 0.0 standard drinks    Drug use: No        Allergies   Allergen Reactions    Amlodipine Swelling    Compazine [Prochlorperazine Maleate] Swelling    Lisinopril Hives and Swelling     Skin tightens on his face and gets hives   ,   Past Medical History:   Diagnosis Date    Asthma     Dermatitis     Fatty liver     Gastroesophageal reflux disease with hiatal hernia     Hypertension     Obesity (BMI 30-39.9)    ,   Past Surgical History:   Procedure Laterality Date    LYMPH NODE BIOPSY      surgery due to cat scratch disease age 1   [facility] WISDOM TOOTH EXTRACTION     ,   Social History     Tobacco Use    Smoking status: Never Smoker    Smokeless tobacco: Never Used   Substance Use Topics    Alcohol use: No     Alcohol/week: 0.0 standard drinks    Drug use: No   , No family history on file.     PHYSICAL EXAMINATION:  [ INSTRUCTIONS:  \"[x]\" Indicates a positive item  \"[]\" Indicates a negative item  -- DELETE ALL ITEMS NOT EXAMINED]  [x] Alert  [x] Oriented to person/place/time    [x] No apparent distress  [] Toxic appearing    [] Face flushed appearing [x] Sclera clear  [] Lips are cyanotic      [x] Breathing appears normal  [] Appears tachypneic      [] Rash on visible skin    [x] Cranial Nerves II-XII grossly intact    [x] Motor grossly intact in visible upper extremities    [x] Motor grossly intact in visible lower extremities    [x] Normal Mood  [] Anxious appearing    [] Depressed appearing  [] Confused appearing      [] Poor short term memory  [] Poor long term memory    [] OTHER:      Due to this being a TeleHealth encounter, evaluation of the following organ systems is limited: Vitals/Constitutional/EENT/Resp/CV/GI//MS/Neuro/Skin/Heme-Lymph-Imm. 1. Viral illness    - COVID-19 Ambulatory; Future    Tx symptomatically for now. Quarantine. Will fu after testing. Side effects, adverse effects of the medication prescribed today, as well as treatment plan/ rationale and result expectations have been discussed with the patient who expresses understanding and desires to proceed. Close follow up to evaluate treatment results and for coordination of care. I have reviewed the patient's medical history in detail and updated the computerized patient record. As always, patient is advised that if symptoms worsen in any way they will proceed to the nearest emergency room. An  electronic signature was used to authenticate this note. --ELPIDIO Alejandro - CNP on 11/11/2020 at 5:15 PM        Pursuant to the emergency declaration under the [facility], [facility] waiver authority and the [employer] Act, this Virtual  Visit was conducted, with patient's consent, to reduce the patient's risk of exposure to COVID-19 and provide continuity of care for an established patient. Services were provided through a video synchronous discussion virtually to substitute for in-person clinic visit.

## 2020-11-12 ENCOUNTER — NURSE ONLY (OUTPATIENT)
Dept: FAMILY MEDICINE CLINIC | Age: 35
End: 2020-11-12
Payer: COMMERCIAL

## 2020-11-12 DIAGNOSIS — B34.9 VIRAL ILLNESS: ICD-10-CM

## 2020-11-12 PROCEDURE — 99000 SPECIMEN HANDLING OFFICE-LAB: CPT | Performed by: NURSE PRACTITIONER

## 2020-11-13 LAB
SARS-COV-2: NOT DETECTED
SOURCE: NORMAL

## 2020-11-13 RX ORDER — DULOXETIN HYDROCHLORIDE 60 MG/1
CAPSULE, DELAYED RELEASE ORAL
Qty: 90 CAPSULE | Refills: 2 | Status: SHIPPED | OUTPATIENT
Start: 2020-11-13 | End: 2021-11-01

## 2020-11-13 RX ORDER — TADALAFIL 5 MG/1
5 TABLET ORAL DAILY
Qty: 30 TABLET | Refills: 5 | Status: SHIPPED | OUTPATIENT
Start: 2020-11-13 | End: 2021-05-05 | Stop reason: SDUPTHER

## 2020-11-23 ENCOUNTER — VIRTUAL VISIT (OUTPATIENT)
Dept: FAMILY MEDICINE CLINIC | Age: 35
End: 2020-11-23
Payer: COMMERCIAL

## 2020-11-23 PROCEDURE — 99213 OFFICE O/P EST LOW 20 MIN: CPT | Performed by: FAMILY MEDICINE

## 2020-11-23 RX ORDER — AZITHROMYCIN 250 MG/1
TABLET, FILM COATED ORAL
Qty: 1 PACKET | Refills: 0 | Status: SHIPPED | OUTPATIENT
Start: 2020-11-23 | End: 2020-12-03

## 2020-11-23 RX ORDER — BENZONATATE 200 MG/1
200 CAPSULE ORAL 3 TIMES DAILY PRN
Qty: 30 CAPSULE | Refills: 0 | Status: SHIPPED | OUTPATIENT
Start: 2020-11-23 | End: 2020-11-30

## 2020-11-23 RX ORDER — ALBUTEROL SULFATE 2.5 MG/3ML
2.5 SOLUTION RESPIRATORY (INHALATION) EVERY 6 HOURS PRN
Qty: 120 EACH | Refills: 3 | Status: SHIPPED | OUTPATIENT
Start: 2020-11-23 | End: 2021-12-27 | Stop reason: SDUPTHER

## 2020-11-23 NOTE — LETTER
[address]  Phone: [phone]  Fax: [phone]    Joellen Bautista MD        November 23, 2020     Patient: Zheng Shankar   YOB: 1985   Date of Visit: 11/23/2020       To Whom It May Concern: It is my medical opinion that Krista Ewing should remain out of work until we have the results of his covid test that we took today. Likely won't have until after Wednesday. If you have any questions or concerns, please don't hesitate to call.     Sincerely,        Joellen Bautista MD

## 2020-11-23 NOTE — PROGRESS NOTES
2020    TELEHEALTH EVALUATION -- Audio/Visual (During ZI-03 public health emergency)    Due to COVID 19 outbreak, patient's office visit was converted to a virtual visit. Patient was contacted and agreed to proceed with a virtual visit via [email or web address]  The risks and benefits of converting to a virtual visit were discussed in light of the current infectious disease epidemic. Patient also understood that insurance coverage and co-pays are up to their individual insurance plans. HPI:    Haleigh Vega (:  1985) has requested an audio/video evaluation for the following concern(s):  Chief Complaint   Patient presents with    URI     Pt c/o x4 days sinus pressure, stuffy, runny nose, prod. cough with green/yellow mucus, and diarrhea       4 days ago, runny nose, sinus pressure  Gone into chest  Stuffy nose  Cough productive of yellow/greenish phlegm  No fever  Some SOB     Unsure of any covid exposure  Has not called off work     Did a covid test a while back     Patient Active Problem List   Diagnosis    Hypertension    Asthma    Gastroesophageal reflux disease with hiatal hernia    Dermatitis     Past Medical History:   Diagnosis Date    Asthma     Dermatitis     Fatty liver     Gastroesophageal reflux disease with hiatal hernia     Hypertension     Obesity (BMI 30-39. 9)          Review of Systems    Prior to Visit Medications    Medication Sig Taking?  Authorizing Provider   azithromycin (ZITHROMAX) 250 MG tablet 2 tabs orally on first day, then one tab daily for four days Yes Vy Peacock MD   benzonatate (TESSALON) 200 MG capsule Take 1 capsule by mouth 3 times daily as needed for Cough Yes Vy Peacock MD   albuterol (PROVENTIL) (2.5 MG/3ML) 0.083% nebulizer solution Take 3 mLs by nebulization every 6 hours as needed for Wheezing Yes Vy Peacock MD   tadalafil (CIALIS) 5 MG tablet Take 1 tablet by mouth daily Yes Vy Peacock MD   DULoxetine (CYMBALTA) 60 MG extended release capsule TAKE 1 CAPSULE BY MOUTH EVERY DAY Yes Jennefer Meckel, MD   cariprazine hcl (VRAYLAR) 3 MG CAPS capsule Take 1 capsule by mouth daily Yes Jennefer Meckel, MD   traZODone (DESYREL) 50 MG tablet Take 1 tablet by mouth nightly Yes Jennefer Meckel, MD   ARIPiprazole (ABILIFY) 2 MG tablet TAKE 1 TABLET BY MOUTH EVERY DAY Yes Jennefer Meckel, MD   metoprolol (LOPRESSOR) 100 MG tablet TAKE 1 TABLET BY MOUTH TWICE A DAY Yes Jennefer Meckel, MD   omeprazole (PRILOSEC) 40 MG delayed release capsule Take 1 capsule by mouth daily Yes Jennefer Meckel, MD   testosterone cypionate (DEPOTESTOTERONE CYPIONATE) 200 MG/ML injection Inject 1 mL into the muscle every 14 days for 180 days.  Yes Jennefer Meckel, MD   EPINEPHrine (EPIPEN 2-AUGUSTUS) 0.3 MG/0.3ML SOAJ injection Inject 0.3 mLs into the muscle once as needed (allergic reaction/bee sting) Use as directed for allergic reaction  Dispense 1 two pack (epi pen)  Jaquelin Gudino DO       Social History     Tobacco Use    Smoking status: Never Smoker    Smokeless tobacco: Never Used   Substance Use Topics    Alcohol use: No     Alcohol/week: 0.0 standard drinks    Drug use: No          PHYSICAL EXAMINATION:  Patient-Reported Vitals 11/23/2020   Patient-Reported Weight 250 lb   Patient-Reported Height 5' 9\"           [ INSTRUCTIONS:  \"[x]\" Indicates a positive item  \"[]\" Indicates a negative item  -- DELETE ALL ITEMS NOT EXAMINED]  [x] Alert  [x] Oriented to person/place/time    [x] No apparent distress  [] Toxic appearing    [] Face flushed appearing [x] Sclera clear  [] Lips are cyanotic      [x] Breathing appears normal  [] Appears tachypneic      [] Rash on visible skin    [x] Cranial Nerves II-XII grossly intact    [x] Motor grossly intact in visible upper extremities    [x] Motor grossly intact in visible lower extremities    [x] Normal Mood  [] Anxious appearing    [] Depressed appearing  [] Confused appearing      [] Poor short term memory  [] Poor long term memory    [] OTHER:      Due to this being a TeleHealth encounter, evaluation of the following organ systems is limited: Vitals/Constitutional/EENT/Resp/CV/GI//MS/Neuro/Skin/Heme-Lymph-Imm. Lab Results   Component Value Date    WBC 9.1 10/06/2020    HGB 17.5 10/06/2020    HCT 53.2 (H) 10/06/2020     10/06/2020    CHOL 213 (H) 10/06/2020    TRIG 139 10/06/2020    HDL 54 10/06/2020    ALT 58 (H) 10/06/2020    AST 27 10/06/2020     10/06/2020    K 5.0 (H) 10/06/2020     10/06/2020    CREATININE 0.74 10/06/2020    BUN 14 10/06/2020    CO2 23 10/06/2020    TSH 2.140 10/06/2020    PSA 0.54 08/07/2019    LABA1C 5.8 08/07/2019         ASSESSMENT/PLAN:     Diagnosis Orders   1. Acute bronchitis, unspecified organism  azithromycin (ZITHROMAX) 250 MG tablet    benzonatate (TESSALON) 200 MG capsule    COVID-19 Ambulatory    albuterol (PROVENTIL) (2.5 MG/3ML) 0.083% nebulizer solution   2. Hyperglycemia  Hemoglobin A1C       Wants compounded cream from [facility] that was previously prescribed by Dr. Climmie Kehr  Nifedipine/lidocaine for rectal fissure    Treat mild bronchitis  covid testing     Off work      No follow-ups on file. An  electronic signature was used to authenticate this note. --Lisa Hicks MD on 11/23/2020 at 4:07 PM        Pursuant to the emergency declaration under the [facility], 1135 waiver authority and the [employer] and [employer] Act, this Virtual  Visit was conducted, with patient's consent, to reduce the patient's risk of exposure to COVID-19 and provide continuity of care for an established patient. Services were provided through a video synchronous discussion virtually to substitute for in-person clinic visit. Patient was at home, Provider was at [facility] office.

## 2020-11-24 DIAGNOSIS — J20.9 ACUTE BRONCHITIS, UNSPECIFIED ORGANISM: ICD-10-CM

## 2020-11-26 LAB
SARS-COV-2: NOT DETECTED
SOURCE: NORMAL

## 2020-12-23 DIAGNOSIS — R73.9 HYPERGLYCEMIA: ICD-10-CM

## 2020-12-23 LAB — HBA1C MFR BLD: 5.6 % (ref 4.8–5.9)

## 2021-01-04 ENCOUNTER — VIRTUAL VISIT (OUTPATIENT)
Dept: BEHAVIORAL/MENTAL HEALTH CLINIC | Age: 36
End: 2021-01-04
Payer: COMMERCIAL

## 2021-01-04 DIAGNOSIS — F33.9 MAJOR DEPRESSIVE DISORDER, RECURRENT EPISODE WITH ANXIOUS DISTRESS (HCC): Primary | ICD-10-CM

## 2021-01-04 PROCEDURE — 90832 PSYTX W PT 30 MINUTES: CPT | Performed by: PSYCHOLOGIST

## 2021-01-04 RX ORDER — DULOXETIN HYDROCHLORIDE 30 MG/1
CAPSULE, DELAYED RELEASE ORAL
Qty: 90 CAPSULE | Refills: 2 | Status: SHIPPED | OUTPATIENT
Start: 2021-01-04 | End: 2021-01-07

## 2021-01-04 ASSESSMENT — PATIENT HEALTH QUESTIONNAIRE - PHQ9
9. THOUGHTS THAT YOU WOULD BE BETTER OFF DEAD, OR OF HURTING YOURSELF: 2
8. MOVING OR SPEAKING SO SLOWLY THAT OTHER PEOPLE COULD HAVE NOTICED. OR THE OPPOSITE, BEING SO FIGETY OR RESTLESS THAT YOU HAVE BEEN MOVING AROUND A LOT MORE THAN USUAL: 2
2. FEELING DOWN, DEPRESSED OR HOPELESS: 3
10. IF YOU CHECKED OFF ANY PROBLEMS, HOW DIFFICULT HAVE THESE PROBLEMS MADE IT FOR YOU TO DO YOUR WORK, TAKE CARE OF THINGS AT HOME, OR GET ALONG WITH OTHER PEOPLE: 1
1. LITTLE INTEREST OR PLEASURE IN DOING THINGS: 3
3. TROUBLE FALLING OR STAYING ASLEEP: 2
SUM OF ALL RESPONSES TO PHQ9 QUESTIONS 1 & 2: 6
SUM OF ALL RESPONSES TO PHQ QUESTIONS 1-9: 22
SUM OF ALL RESPONSES TO PHQ QUESTIONS 1-9: 22
7. TROUBLE CONCENTRATING ON THINGS, SUCH AS READING THE NEWSPAPER OR WATCHING TELEVISION: 3
SUM OF ALL RESPONSES TO PHQ QUESTIONS 1-9: 20

## 2021-01-04 NOTE — PROGRESS NOTES
Behavioral Health Consultation  Tere Brown Psy.D. Psychologist  1/4/21  4:08 PM EST      Time spent with Patient: 25 minutes  This is patient's fifth  [facility] appointment. Reason for Consult:  Anxiety and depression  Referring Provider: Alonso Lane MD      Feedback given to PCP. TELEHEALTH EVALUATION -- Audio and/or Visual (During XWNDG-30 public health emergency)    Due to COVID 19 outbreak, patient's office visit was converted to a virtual visit. Patient was contacted and agreed to proceed with a virtual visit via [employer]. Patient reports that they are located at home. Provider located at home office. The risks and benefits of converting to a virtual visit were discussed in light of the current infectious disease epidemic. Patient also understood that insurance coverage and co-pays are up to their individual insurance plans. Patient provides verbal consent for this visit to be billed to insurance. Pursuant to the emergency declaration under the [facility], 1135 waiver authority and the [employer] and [employer] Act, this Virtual  Visit was conducted, with patient's consent, to reduce the patient's risk of exposure to COVID-19 and provide continuity of care for an established patient. Services were provided through an audio and video synchronous discussion virtually to substitute for in-person clinic visit. S:  Pt reports that he is feeling depressed. He is not quite sure why. He reports that he had a bad week last week when he was on vacation. He reports that work has been ok. He notes that if his mind is occupied he feels pretty good. When work is slow he has more trouble. He reports that he is pacing a lot. He reports that he has been anxious, feels that he is anxious frequently a bwork. Notes that his boss has been better. He does have FMLA that he takes intermittently. Pt reports that he recently started [employer] (about a month ago). He feels that he has been more anxious or depressed. He feels that he is forgetting things more often. 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 He quit this in about October. He reports that when he is working he does not sleep much due to anxiety about oversleeping. He is sleeping even less in the past month.           O:  MSE:    Appearance    alert, cooperative   Personal Hygiene : appropriately dressed and healthy looking  Appetite abnormal  Sleep disturbance Yes, including: frequent night time awakening and last week hypersomnia  Fatigue Yes  Loss of pleasure Yes  Impulsive behavior No  Speech    spontaneous, normal rate and normal volume  Mood   depressed   Affect    depressed affect  Thought Content    worthlessness and excessive preoccupations  Thought Process    linear, goal directed and coherent  Associations    logical connections  Insight    fair  Judgment    good  Orientation    oriented to person, place, time, and general circumstances  Memory    recent and remote memory intact  Attention/Concentration    intact  Morbid ideation Yes  Suicide Assessment    no suicidal ideation    History:    Medications:   Current Outpatient Medications   Medication Sig Dispense Refill    albuterol (PROVENTIL) (2.5 MG/3ML) 0.083% nebulizer solution Take 3 mLs by nebulization every 6 hours as needed for Wheezing 120 each 3    tadalafil (CIALIS) 5 MG tablet Take 1 tablet by mouth daily 30 tablet 5    DULoxetine (CYMBALTA) 60 MG extended release capsule TAKE 1 CAPSULE BY MOUTH EVERY DAY 90 capsule 2    cariprazine hcl (VRAYLAR) 3 MG CAPS capsule Take 1 capsule by mouth daily 30 capsule 5    EPINEPHrine (EPIPEN 2-AUGUSTUS) 0.3 MG/0.3ML SOAJ injection Inject 0.3 mLs into the muscle once as needed (allergic reaction/bee sting) Use as directed for allergic reaction  Dispense 1 two pack (epi pen) 1 each 0  traZODone (DESYREL) 50 MG tablet Take 1 tablet by mouth nightly 30 tablet 2    ARIPiprazole (ABILIFY) 2 MG tablet TAKE 1 TABLET BY MOUTH EVERY DAY 90 tablet 1    metoprolol (LOPRESSOR) 100 MG tablet TAKE 1 TABLET BY MOUTH TWICE A  tablet 1    omeprazole (PRILOSEC) 40 MG delayed release capsule Take 1 capsule by mouth daily 90 capsule 2    testosterone cypionate (DEPOTESTOTERONE CYPIONATE) 200 MG/ML injection Inject 1 mL into the muscle every 14 days for 180 days. 6 mL 1     No current facility-administered medications for this visit.         Social History:   Social History     Socioeconomic History    Marital status:      Spouse name: Not on file    Number of children: 1    Years of education: Not on file    Highest education level: Not on file   Occupational History    Not on file   Social Needs    Financial resource strain: Not on file    Food insecurity     Worry: Not on file     Inability: Not on file   [employer] needs     Medical: Not on file     Non-medical: Not on file   Tobacco Use    Smoking status: Never Smoker    Smokeless tobacco: Never Used   Substance and Sexual Activity    Alcohol use: No     Alcohol/week: 0.0 standard drinks    Drug use: No    Sexual activity: Not on file   Lifestyle    Physical activity     Days per week: Not on file     Minutes per session: Not on file    Stress: Not on file   Relationships    Social connections     Talks on phone: Not on file     Gets together: Not on file     Attends Lutheran service: Not on file     Active member of club or organization: Not on file     Attends meetings of clubs or organizations: Not on file     Relationship status: Not on file    Intimate partner violence     Fear of current or ex partner: Not on file     Emotionally abused: Not on file     Physically abused: Not on file     Forced sexual activity: Not on file   Other Topics Concern    Not on file   Social History Narrative    Not on file TOBACCO:   reports that he has never smoked. He has never used smokeless tobacco.  ETOH:   reports no history of alcohol use. Family History:   No family history on file. A:  Administered the PHQ9 which indicates a self report of severe symptom distress. Pt would benefit from [facility] services to increase coping skills to provide symptom management/control/relief. PHQ Scores 1/4/2021 3/16/2020 3/2/2020 2/5/2020 1/27/2020 3/13/2019 12/18/2018   PHQ2 Score 6 2 4 2 5 0 0   PHQ9 Score 22 12 16 11 20 0 0     Interpretation of Total Score Depression Severity: 1-4 = Minimal depression, 5-9 = Mild depression, 10-14 = Moderate depression, 15-19 = Moderately severe depression, 20-27 = Severe depression      Diagnosis:    Major depressive disorder; recurrent and with anxious distress            Diagnosis Date    Asthma     Dermatitis     Fatty liver     Gastroesophageal reflux disease with hiatal hernia     Hypertension     Obesity (BMI 30-39. 9)            Plan:  Pt interventions:  Discussed various factors related to the development and maintenance of  depression (including biological, cognitive, behavioral, and environmental factors), Discussed self-care (sleep, nutrition, rewarding activities, social support, exercise), [place]-setting to identify pt's primary goals for [facility] visit / overall health, Supportive techniques, Emphasized self-care as important for managing overall health and Emphasized importance of regular practice of relaxation strategies to target / promote stress reduction      Pt Behavioral Change Plan:  Follow up in 2 weeks      Please note this report has been partially produced using speech recognition software  And may cause contain errors related to that system including grammar, punctuation and spelling as well as words and phrases that may seem inappropriate. If there are questions or concerns please feel free to contact me to clarify.

## 2021-01-07 ENCOUNTER — OFFICE VISIT (OUTPATIENT)
Dept: FAMILY MEDICINE CLINIC | Age: 36
End: 2021-01-07
Payer: COMMERCIAL

## 2021-01-07 VITALS
TEMPERATURE: 98 F | BODY MASS INDEX: 37.89 KG/M2 | OXYGEN SATURATION: 97 % | SYSTOLIC BLOOD PRESSURE: 112 MMHG | HEIGHT: 68 IN | DIASTOLIC BLOOD PRESSURE: 80 MMHG | HEART RATE: 74 BPM | WEIGHT: 250 LBS

## 2021-01-07 DIAGNOSIS — F41.9 ANXIETY AND DEPRESSION: Primary | ICD-10-CM

## 2021-01-07 DIAGNOSIS — F32.A ANXIETY AND DEPRESSION: Primary | ICD-10-CM

## 2021-01-07 DIAGNOSIS — R19.7 DIARRHEA, UNSPECIFIED TYPE: ICD-10-CM

## 2021-01-07 DIAGNOSIS — R11.0 NAUSEA: ICD-10-CM

## 2021-01-07 PROCEDURE — 99213 OFFICE O/P EST LOW 20 MIN: CPT | Performed by: FAMILY MEDICINE

## 2021-01-07 RX ORDER — LAMOTRIGINE 25 MG/1
25 TABLET ORAL 2 TIMES DAILY
Qty: 60 TABLET | Refills: 2 | Status: SHIPPED | OUTPATIENT
Start: 2021-01-07 | End: 2021-02-02

## 2021-01-07 NOTE — PROGRESS NOTES
Chief Complaint   Patient presents with    Discuss Medications     sick every morning, vraylar    Anxiety       HPI:  Laurie Zhou is a 38 y.o. male    cymbalta 60mg and vraylar 3mg  Had changed from abilify to vraylar due to weight gain  Still having nausea every morning    States he doesn't feel like himself    Does talk to Dr. Virginie Veronica periodically     Not sleeping very well          Wt Readings from Last 3 Encounters:   01/07/21 250 lb (113.4 kg)   11/10/20 257 lb (116.6 kg)   09/26/20 256 lb (116.1 kg)         Past Medical History:   Diagnosis Date    Asthma     Dermatitis     Fatty liver     Gastroesophageal reflux disease with hiatal hernia     Hypertension     Obesity (BMI 30-39. 9)      Past Surgical History:   Procedure Laterality Date    LYMPH NODE BIOPSY      surgery due to cat scratch disease age 1   [facility] WISDOM TOOTH EXTRACTION       History reviewed. No pertinent family history.   Social History     Socioeconomic History    Marital status:      Spouse name: None    Number of children: 1    Years of education: None    Highest education level: None   Occupational History    None   Social Needs    Financial resource strain: None    Food insecurity     Worry: None     Inability: None    Transportation needs     Medical: None     Non-medical: None   Tobacco Use    Smoking status: Never Smoker    Smokeless tobacco: Never Used   Substance and Sexual Activity    Alcohol use: No     Alcohol/week: 0.0 standard drinks    Drug use: No    Sexual activity: None   Lifestyle    Physical activity     Days per week: None     Minutes per session: None    Stress: None   Relationships    Social connections     Talks on phone: None     Gets together: None     Attends Adventism service: None     Active member of club or organization: None     Attends meetings of clubs or organizations: None     Relationship status: None    Intimate partner violence     Fear of current or ex partner: None Emotionally abused: None     Physically abused: None     Forced sexual activity: None   Other Topics Concern    None   Social History Narrative    None     Current Outpatient Medications   Medication Sig Dispense Refill    lamoTRIgine (LAMICTAL) 25 MG tablet Take 1 tablet by mouth 2 times daily 60 tablet 2    albuterol (PROVENTIL) (2.5 MG/3ML) 0.083% nebulizer solution Take 3 mLs by nebulization every 6 hours as needed for Wheezing 120 each 3    tadalafil (CIALIS) 5 MG tablet Take 1 tablet by mouth daily 30 tablet 5    DULoxetine (CYMBALTA) 60 MG extended release capsule TAKE 1 CAPSULE BY MOUTH EVERY DAY 90 capsule 2    cariprazine hcl (VRAYLAR) 3 MG CAPS capsule Take 1 capsule by mouth daily 30 capsule 5    traZODone (DESYREL) 50 MG tablet Take 1 tablet by mouth nightly 30 tablet 2    metoprolol (LOPRESSOR) 100 MG tablet TAKE 1 TABLET BY MOUTH TWICE A  tablet 1    omeprazole (PRILOSEC) 40 MG delayed release capsule Take 1 capsule by mouth daily 90 capsule 2    testosterone cypionate (DEPOTESTOTERONE CYPIONATE) 200 MG/ML injection Inject 1 mL into the muscle every 14 days for 180 days. 6 mL 1    EPINEPHrine (EPIPEN 2-AUGUSTUS) 0.3 MG/0.3ML SOAJ injection Inject 0.3 mLs into the muscle once as needed (allergic reaction/bee sting) Use as directed for allergic reaction  Dispense 1 two pack (epi pen) 1 each 0     No current facility-administered medications for this visit.       Allergies   Allergen Reactions    Amlodipine Swelling    Compazine [Prochlorperazine Maleate] Swelling    Lisinopril Hives and Swelling     Skin tightens on his face and gets hives       Review of Systems:   General ROS: no f/c/n/v  Respiratory ROS: no cough, shortness of breath, or wheezing  Cardiovascular ROS: no chest pain or dyspnea on exertion  Gastrointestinal ROS: no abdominal pain, change in bowel habits, or black or bloody stools  Genito-Urinary ROS: per HPI

## 2021-02-02 DIAGNOSIS — F41.9 ANXIETY AND DEPRESSION: ICD-10-CM

## 2021-02-02 DIAGNOSIS — F32.A ANXIETY AND DEPRESSION: ICD-10-CM

## 2021-02-02 RX ORDER — LAMOTRIGINE 25 MG/1
TABLET ORAL
Qty: 60 TABLET | Refills: 2 | Status: SHIPPED | OUTPATIENT
Start: 2021-02-02 | End: 2021-05-05 | Stop reason: SDUPTHER

## 2021-02-08 ENCOUNTER — OFFICE VISIT (OUTPATIENT)
Dept: FAMILY MEDICINE CLINIC | Age: 36
End: 2021-02-08
Payer: COMMERCIAL

## 2021-02-08 VITALS
HEIGHT: 68 IN | OXYGEN SATURATION: 98 % | SYSTOLIC BLOOD PRESSURE: 118 MMHG | DIASTOLIC BLOOD PRESSURE: 70 MMHG | BODY MASS INDEX: 36.77 KG/M2 | HEART RATE: 58 BPM | TEMPERATURE: 98 F | WEIGHT: 242.6 LBS

## 2021-02-08 DIAGNOSIS — F41.9 ANXIETY AND DEPRESSION: Primary | ICD-10-CM

## 2021-02-08 DIAGNOSIS — F32.A ANXIETY AND DEPRESSION: Primary | ICD-10-CM

## 2021-02-08 DIAGNOSIS — I10 ESSENTIAL HYPERTENSION: ICD-10-CM

## 2021-02-08 PROCEDURE — 99213 OFFICE O/P EST LOW 20 MIN: CPT | Performed by: FAMILY MEDICINE

## 2021-02-08 NOTE — PROGRESS NOTES
Chief Complaint   Patient presents with    Anxiety     1 month        HPI:  Dax Saavedra is a 38 y.o. male    cymbalta 60mg  Stopped vraylar    lamictal 25mg BID  Sleep better    occ trazodone    Losing weight  Doing intermittent fasting    Wt Readings from Last 3 Encounters:   02/08/21 242 lb 9.6 oz (110 kg)   01/07/21 250 lb (113.4 kg)   11/10/20 257 lb (116.6 kg)         Past Medical History:   Diagnosis Date    Asthma     Dermatitis     Fatty liver     Gastroesophageal reflux disease with hiatal hernia     Hypertension     Obesity (BMI 30-39. 9)      Past Surgical History:   Procedure Laterality Date    LYMPH NODE BIOPSY      surgery due to cat scratch disease age 1   [facility] WISDOM TOOTH EXTRACTION       History reviewed. No pertinent family history.   Social History     Socioeconomic History    Marital status:      Spouse name: None    Number of children: 1    Years of education: None    Highest education level: None   Occupational History    None   Social Needs    Financial resource strain: None    Food insecurity     Worry: None     Inability: None    Transportation needs     Medical: None     Non-medical: None   Tobacco Use    Smoking status: Never Smoker    Smokeless tobacco: Never Used   Substance and Sexual Activity    Alcohol use: No     Alcohol/week: 0.0 standard drinks    Drug use: No    Sexual activity: None   Lifestyle    Physical activity     Days per week: None     Minutes per session: None    Stress: None   Relationships    Social connections     Talks on phone: None     Gets together: None     Attends Latter-day service: None     Active member of club or organization: None     Attends meetings of clubs or organizations: None     Relationship status: None    Intimate partner violence     Fear of current or ex partner: None     Emotionally abused: None     Physically abused: None     Forced sexual activity: None   Other Topics Concern    None   Social History Narrative [place])   Pulse 58   Temp 98 °F (36.7 °C)   Ht 5' 8\" (1.727 m)   Wt 242 lb 9.6 oz (110 kg)   SpO2 98%   BMI 36.89 kg/m²     Gen: Well, NAD, Alert, Oriented x 3   HEENT: EOMI, eyes clear, MMM  Skin: no rash or jaundice  Lungs: CTA B w/out Rales/Wheezes/Rhonchi, Good respiratory effort   Heart: RRR, S1S2, w/out M/R/G, non-displaced PMI   Psych: generally euthymic here today with intact insight and judgement, no SI/HI  Neuro: Neurovascularly intact w/ Sensory/Motor intact UE/LE Bilaterally. Lab Results   Component Value Date    WBC 9.1 10/06/2020    HGB 17.5 10/06/2020    HCT 53.2 (H) 10/06/2020     10/06/2020    CHOL 213 (H) 10/06/2020    TRIG 139 10/06/2020    HDL 54 10/06/2020    ALT 58 (H) 10/06/2020    AST 27 10/06/2020     10/06/2020    K 5.0 (H) 10/06/2020     10/06/2020    CREATININE 0.74 10/06/2020    BUN 14 10/06/2020    CO2 23 10/06/2020    TSH 2.140 10/06/2020    PSA 0.54 08/07/2019    LABA1C 5.6 12/23/2020         A&P   Diagnosis Orders   1. Anxiety and depression     2.  Essential hypertension         Lamictal, cymbalta and occ trazodone has been good for him this month  Much better    Continue diet efforts    Try to incorporate exercise          Nikki Briseno MD

## 2021-03-08 DIAGNOSIS — I10 ESSENTIAL HYPERTENSION: ICD-10-CM

## 2021-03-08 RX ORDER — METOPROLOL TARTRATE 100 MG/1
TABLET ORAL
Qty: 180 TABLET | Refills: 1 | Status: SHIPPED | OUTPATIENT
Start: 2021-03-08 | End: 2022-03-22

## 2021-05-05 ENCOUNTER — OFFICE VISIT (OUTPATIENT)
Dept: FAMILY MEDICINE CLINIC | Age: 36
End: 2021-05-05
Payer: COMMERCIAL

## 2021-05-05 VITALS
TEMPERATURE: 97.3 F | WEIGHT: 234.4 LBS | OXYGEN SATURATION: 96 % | DIASTOLIC BLOOD PRESSURE: 80 MMHG | HEART RATE: 74 BPM | BODY MASS INDEX: 35.52 KG/M2 | SYSTOLIC BLOOD PRESSURE: 116 MMHG | HEIGHT: 68 IN

## 2021-05-05 DIAGNOSIS — N52.9 ERECTILE DYSFUNCTION, UNSPECIFIED ERECTILE DYSFUNCTION TYPE: ICD-10-CM

## 2021-05-05 DIAGNOSIS — F41.9 ANXIETY AND DEPRESSION: Primary | ICD-10-CM

## 2021-05-05 DIAGNOSIS — Z30.09 VASECTOMY EVALUATION: ICD-10-CM

## 2021-05-05 DIAGNOSIS — F32.A ANXIETY AND DEPRESSION: Primary | ICD-10-CM

## 2021-05-05 DIAGNOSIS — I10 ESSENTIAL HYPERTENSION: ICD-10-CM

## 2021-05-05 PROCEDURE — 99213 OFFICE O/P EST LOW 20 MIN: CPT | Performed by: FAMILY MEDICINE

## 2021-05-05 RX ORDER — LAMOTRIGINE 100 MG/1
100 TABLET ORAL EVERY EVENING
Qty: 90 TABLET | Refills: 3 | Status: SHIPPED | OUTPATIENT
Start: 2021-05-05 | End: 2021-08-13

## 2021-05-05 RX ORDER — TADALAFIL 5 MG/1
5 TABLET ORAL DAILY
Qty: 90 TABLET | Refills: 1 | Status: SHIPPED | OUTPATIENT
Start: 2021-05-05 | End: 2021-07-29 | Stop reason: SDUPTHER

## 2021-05-05 NOTE — PROGRESS NOTES
Chief Complaint   Patient presents with    Anxiety     would like meds upped    Referral - General     would like vasctomy       HPI:  Gualberto Rodríguez is a 38 y.o. male    cymbalta 60mg    lamictal 25mg BID    Would like meds upped     occ trazodone    Losing weight  Doing intermittent fasting    Patient would to consult with urology about vasectomy    Wt Readings from Last 3 Encounters:   05/05/21 234 lb 6.4 oz (106.3 kg)   02/08/21 242 lb 9.6 oz (110 kg)   01/07/21 250 lb (113.4 kg)         Past Medical History:   Diagnosis Date    Asthma     Dermatitis     Fatty liver     Gastroesophageal reflux disease with hiatal hernia     Hypertension     Obesity (BMI 30-39. 9)      Past Surgical History:   Procedure Laterality Date    LYMPH NODE BIOPSY      surgery due to cat scratch disease age 1   Rosemary Valera WISDOM TOOTH EXTRACTION       History reviewed. No pertinent family history.   Social History     Socioeconomic History    Marital status:      Spouse name: None    Number of children: 1    Years of education: None    Highest education level: None   Occupational History    None   Social Needs    Financial resource strain: None    Food insecurity     Worry: None     Inability: None    Transportation needs     Medical: None     Non-medical: None   Tobacco Use    Smoking status: Never Smoker    Smokeless tobacco: Never Used   Substance and Sexual Activity    Alcohol use: No     Alcohol/week: 0.0 standard drinks    Drug use: No    Sexual activity: None   Lifestyle    Physical activity     Days per week: None     Minutes per session: None    Stress: None   Relationships    Social connections     Talks on phone: None     Gets together: None     Attends Caodaism service: None     Active member of club or organization: None     Attends meetings of clubs or organizations: None     Relationship status: None    Intimate partner violence     Fear of current or ex partner: None     Emotionally abused: None Physically abused: None     Forced sexual activity: None   Other Topics Concern    None   Social History Narrative    None     Current Outpatient Medications   Medication Sig Dispense Refill    tadalafil (CIALIS) 5 MG tablet Take 1 tablet by mouth daily 90 tablet 1    lamoTRIgine (LAMICTAL) 100 MG tablet Take 1 tablet by mouth every evening 90 tablet 3    metoprolol (LOPRESSOR) 100 MG tablet TAKE 1 TABLET BY MOUTH TWICE A  tablet 1    albuterol (PROVENTIL) (2.5 MG/3ML) 0.083% nebulizer solution Take 3 mLs by nebulization every 6 hours as needed for Wheezing 120 each 3    DULoxetine (CYMBALTA) 60 MG extended release capsule TAKE 1 CAPSULE BY MOUTH EVERY DAY 90 capsule 2    EPINEPHrine (EPIPEN 2-AUGUSTUS) 0.3 MG/0.3ML SOAJ injection Inject 0.3 mLs into the muscle once as needed (allergic reaction/bee sting) Use as directed for allergic reaction  Dispense 1 two pack (epi pen) 1 each 0    testosterone cypionate (DEPOTESTOTERONE CYPIONATE) 200 MG/ML injection Inject 1 mL into the muscle every 14 days for 180 days. 6 mL 1     No current facility-administered medications for this visit. Allergies   Allergen Reactions    Amlodipine Swelling    Bee Venom     Compazine [Prochlorperazine Maleate] Swelling    Lisinopril Hives and Swelling     Skin tightens on his face and gets hives       Review of Systems:   General ROS: no f/c/n/v  Respiratory ROS: no cough, shortness of breath, or wheezing  Cardiovascular ROS: no chest pain or dyspnea on exertion  Gastrointestinal ROS: no abdominal pain, change in bowel habits, or black or bloody stools  Genito-Urinary ROS: per HPI  Musculoskeletal ROS: negative for - gait disturbance, joint pain or joint stiffness  Neurological ROS: negative for - behavioral changes, memory loss, numbness/tingling, tremors or weakness    In general patient otherwise reports feeling well.      Physical Exam:  /80 (Site: Right Upper Arm)   Pulse 74   Temp 97.3 °F (36.3 °C)   Ht 5' 8\" (1.727 m)   Wt 234 lb 6.4 oz (106.3 kg)   SpO2 96%   BMI 35.64 kg/m²     Gen: Well, NAD, Alert, Oriented x 3   HEENT: EOMI, eyes clear, MMM  Skin: no rash or jaundice  Lungs: CTA B w/out Rales/Wheezes/Rhonchi, Good respiratory effort   Heart: RRR, S1S2, w/out M/R/G, non-displaced PMI   Psych: generally euthymic here today with intact insight and judgement, no SI/HI  Neuro: Neurovascularly intact w/ Sensory/Motor intact UE/LE Bilaterally. Lab Results   Component Value Date    WBC 9.1 10/06/2020    HGB 17.5 10/06/2020    HCT 53.2 (H) 10/06/2020     10/06/2020    CHOL 213 (H) 10/06/2020    TRIG 139 10/06/2020    HDL 54 10/06/2020    ALT 58 (H) 10/06/2020    AST 27 10/06/2020     10/06/2020    K 5.0 (H) 10/06/2020     10/06/2020    CREATININE 0.74 10/06/2020    BUN 14 10/06/2020    CO2 23 10/06/2020    TSH 2.140 10/06/2020    PSA 0.54 08/07/2019    LABA1C 5.6 12/23/2020         A&P   Diagnosis Orders   1. Anxiety and depression  lamoTRIgine (LAMICTAL) 100 MG tablet   2. Erectile dysfunction, unspecified erectile dysfunction type  tadalafil (CIALIS) 5 MG tablet   3. Vasectomy evaluation  External Referral to Urology   4.  Essential hypertension         Lamictal, cymbalta and occ trazodone has been good for him     lamictal to 100mg     Continue diet efforts   exercise          Martina Savage MD

## 2021-05-07 DIAGNOSIS — F41.9 ANXIETY AND DEPRESSION: ICD-10-CM

## 2021-05-07 DIAGNOSIS — F32.A ANXIETY AND DEPRESSION: ICD-10-CM

## 2021-05-07 RX ORDER — LAMOTRIGINE 25 MG/1
TABLET ORAL
Qty: 180 TABLET | OUTPATIENT
Start: 2021-05-07

## 2021-05-29 DIAGNOSIS — F41.9 ANXIETY AND DEPRESSION: ICD-10-CM

## 2021-05-29 DIAGNOSIS — F32.A ANXIETY AND DEPRESSION: ICD-10-CM

## 2021-05-29 NOTE — TELEPHONE ENCOUNTER
Pharmacy requesting medication refill. Please approve or deny this request.    Rx requested:  Requested Prescriptions     Pending Prescriptions Disp Refills    lamoTRIgine (LAMICTAL) 25 MG tablet [Pharmacy Med Name: LAMOTRIGINE 25 MG TABLET] 180 tablet      Sig: Take 1 tablet by mouth daily         Last Office Visit:   5/5/2021      Next Visit Date:  No future appointments.

## 2021-06-01 RX ORDER — LAMOTRIGINE 25 MG/1
25 TABLET ORAL DAILY
Qty: 180 TABLET | Refills: 1 | Status: SHIPPED | OUTPATIENT
Start: 2021-06-01 | End: 2021-07-21 | Stop reason: CLARIF

## 2021-06-02 ENCOUNTER — OFFICE VISIT (OUTPATIENT)
Dept: FAMILY MEDICINE CLINIC | Age: 36
End: 2021-06-02
Payer: COMMERCIAL

## 2021-06-02 VITALS
OXYGEN SATURATION: 99 % | WEIGHT: 230 LBS | TEMPERATURE: 98.1 F | DIASTOLIC BLOOD PRESSURE: 82 MMHG | HEART RATE: 74 BPM | RESPIRATION RATE: 12 BRPM | HEIGHT: 69 IN | SYSTOLIC BLOOD PRESSURE: 138 MMHG | BODY MASS INDEX: 34.07 KG/M2

## 2021-06-02 DIAGNOSIS — J01.90 ACUTE BACTERIAL SINUSITIS: Primary | ICD-10-CM

## 2021-06-02 DIAGNOSIS — B96.89 ACUTE BACTERIAL SINUSITIS: Primary | ICD-10-CM

## 2021-06-02 PROCEDURE — 99213 OFFICE O/P EST LOW 20 MIN: CPT | Performed by: PHYSICIAN ASSISTANT

## 2021-06-02 RX ORDER — PREDNISONE 20 MG/1
TABLET ORAL
Qty: 11 TABLET | Refills: 0 | Status: SHIPPED | OUTPATIENT
Start: 2021-06-02 | End: 2021-06-11

## 2021-06-02 RX ORDER — DOXYCYCLINE HYCLATE 100 MG
100 TABLET ORAL 2 TIMES DAILY
Qty: 10 TABLET | Refills: 0 | Status: SHIPPED | OUTPATIENT
Start: 2021-06-02 | End: 2021-06-07

## 2021-06-02 RX ORDER — FLUTICASONE PROPIONATE 50 MCG
2 SPRAY, SUSPENSION (ML) NASAL DAILY
Qty: 1 BOTTLE | Refills: 0 | Status: SHIPPED | OUTPATIENT
Start: 2021-06-02 | End: 2021-07-21 | Stop reason: ALTCHOICE

## 2021-06-02 RX ORDER — CETIRIZINE HYDROCHLORIDE 10 MG/1
10 TABLET ORAL DAILY
Qty: 30 TABLET | Refills: 0 | Status: SHIPPED | OUTPATIENT
Start: 2021-06-02 | End: 2021-07-02

## 2021-06-02 ASSESSMENT — ENCOUNTER SYMPTOMS
NAUSEA: 0
ABDOMINAL PAIN: 0
BLOOD IN STOOL: 0
SINUS PAIN: 1
VOMITING: 0
RHINORRHEA: 1
SINUS PRESSURE: 1
EYE PAIN: 0
WHEEZING: 0
SORE THROAT: 0
SHORTNESS OF BREATH: 0
COUGH: 1
EYE DISCHARGE: 0
PHOTOPHOBIA: 0
EYE REDNESS: 0

## 2021-06-02 NOTE — PROGRESS NOTES
Services:     Active Member of Clubs or Organizations:     Attends Club or Organization Meetings:     Marital Status:    Intimate Partner Violence:     Fear of Current or Ex-Partner:     Emotionally Abused:     Physically Abused:     Sexually Abused:      Current Outpatient Medications on File Prior to Visit   Medication Sig Dispense Refill    lamoTRIgine (LAMICTAL) 25 MG tablet Take 1 tablet by mouth daily 180 tablet 1    tadalafil (CIALIS) 5 MG tablet Take 1 tablet by mouth daily 90 tablet 1    lamoTRIgine (LAMICTAL) 100 MG tablet Take 1 tablet by mouth every evening 90 tablet 3    metoprolol (LOPRESSOR) 100 MG tablet TAKE 1 TABLET BY MOUTH TWICE A  tablet 1    albuterol (PROVENTIL) (2.5 MG/3ML) 0.083% nebulizer solution Take 3 mLs by nebulization every 6 hours as needed for Wheezing 120 each 3    DULoxetine (CYMBALTA) 60 MG extended release capsule TAKE 1 CAPSULE BY MOUTH EVERY DAY 90 capsule 2    EPINEPHrine (EPIPEN 2-AUGUSTUS) 0.3 MG/0.3ML SOAJ injection Inject 0.3 mLs into the muscle once as needed (allergic reaction/bee sting) Use as directed for allergic reaction  Dispense 1 two pack (epi pen) 1 each 0    testosterone cypionate (DEPOTESTOTERONE CYPIONATE) 200 MG/ML injection Inject 1 mL into the muscle every 14 days for 180 days. 6 mL 1     No current facility-administered medications on file prior to visit. Amlodipine, Bee venom, Compazine [prochlorperazine maleate], and Lisinopril    Review of Systems   HENT: Positive for congestion, postnasal drip, rhinorrhea, sinus pressure and sinus pain. Negative for ear discharge, ear pain, hearing loss, nosebleeds, sore throat and tinnitus. Eyes: Negative for photophobia, pain, discharge and redness. Respiratory: Positive for cough. Negative for shortness of breath and wheezing. Cardiovascular: Negative for chest pain. Gastrointestinal: Negative for abdominal pain, blood in stool, nausea and vomiting.    Endocrine: Negative for

## 2021-07-07 DIAGNOSIS — E29.1 HYPOGONADISM IN MALE: ICD-10-CM

## 2021-07-08 RX ORDER — TESTOSTERONE CYPIONATE 200 MG/ML
200 INJECTION INTRAMUSCULAR
Qty: 6 ML | Refills: 1 | Status: SHIPPED | OUTPATIENT
Start: 2021-07-08 | End: 2021-10-15 | Stop reason: SDUPTHER

## 2021-07-08 NOTE — TELEPHONE ENCOUNTER
Pt calling to check on status, made aware script is still pending. Pt states he has been waiting for this script. Made pt aware we received request yesterday, and provider out of office yesterday, but back today. Pt states he has been out of testosterone for weeks and would like this sent in today.

## 2021-07-21 ENCOUNTER — OFFICE VISIT (OUTPATIENT)
Dept: FAMILY MEDICINE CLINIC | Age: 36
End: 2021-07-21
Payer: COMMERCIAL

## 2021-07-21 VITALS
SYSTOLIC BLOOD PRESSURE: 122 MMHG | WEIGHT: 232.4 LBS | BODY MASS INDEX: 35.22 KG/M2 | HEART RATE: 74 BPM | DIASTOLIC BLOOD PRESSURE: 80 MMHG | HEIGHT: 68 IN | OXYGEN SATURATION: 100 % | TEMPERATURE: 97.7 F

## 2021-07-21 DIAGNOSIS — R19.7 DIARRHEA, UNSPECIFIED TYPE: ICD-10-CM

## 2021-07-21 DIAGNOSIS — B35.9 TINEA: ICD-10-CM

## 2021-07-21 DIAGNOSIS — H00.015 HORDEOLUM EXTERNUM OF LEFT LOWER EYELID: Primary | ICD-10-CM

## 2021-07-21 DIAGNOSIS — T88.7XXA SIDE EFFECT OF MEDICATION: ICD-10-CM

## 2021-07-21 PROCEDURE — 99213 OFFICE O/P EST LOW 20 MIN: CPT | Performed by: NURSE PRACTITIONER

## 2021-07-21 RX ORDER — CLOTRIMAZOLE AND BETAMETHASONE DIPROPIONATE 10; .64 MG/G; MG/G
CREAM TOPICAL
Qty: 45 G | Refills: 5 | Status: SHIPPED | OUTPATIENT
Start: 2021-07-21 | End: 2021-12-27 | Stop reason: SDUPTHER

## 2021-07-21 RX ORDER — TOBRAMYCIN 3 MG/ML
1 SOLUTION/ DROPS OPHTHALMIC EVERY 4 HOURS
Qty: 1 BOTTLE | Refills: 0 | Status: SHIPPED | OUTPATIENT
Start: 2021-07-21 | End: 2021-07-29

## 2021-07-21 RX ORDER — LOPERAMIDE HYDROCHLORIDE 2 MG/1
2 CAPSULE ORAL 4 TIMES DAILY PRN
Qty: 30 CAPSULE | Refills: 0 | Status: SHIPPED | OUTPATIENT
Start: 2021-07-21 | End: 2021-07-29

## 2021-07-21 RX ORDER — OMEPRAZOLE 40 MG/1
CAPSULE, DELAYED RELEASE ORAL
COMMUNITY
Start: 2021-05-29 | End: 2021-08-23

## 2021-07-21 SDOH — ECONOMIC STABILITY: TRANSPORTATION INSECURITY
IN THE PAST 12 MONTHS, HAS THE LACK OF TRANSPORTATION KEPT YOU FROM MEDICAL APPOINTMENTS OR FROM GETTING MEDICATIONS?: NO

## 2021-07-21 SDOH — ECONOMIC STABILITY: TRANSPORTATION INSECURITY
IN THE PAST 12 MONTHS, HAS LACK OF TRANSPORTATION KEPT YOU FROM MEETINGS, WORK, OR FROM GETTING THINGS NEEDED FOR DAILY LIVING?: NO

## 2021-07-21 SDOH — ECONOMIC STABILITY: FOOD INSECURITY: WITHIN THE PAST 12 MONTHS, THE FOOD YOU BOUGHT JUST DIDN'T LAST AND YOU DIDN'T HAVE MONEY TO GET MORE.: NEVER TRUE

## 2021-07-21 SDOH — ECONOMIC STABILITY: FOOD INSECURITY: WITHIN THE PAST 12 MONTHS, YOU WORRIED THAT YOUR FOOD WOULD RUN OUT BEFORE YOU GOT MONEY TO BUY MORE.: NEVER TRUE

## 2021-07-21 ASSESSMENT — SOCIAL DETERMINANTS OF HEALTH (SDOH): HOW HARD IS IT FOR YOU TO PAY FOR THE VERY BASICS LIKE FOOD, HOUSING, MEDICAL CARE, AND HEATING?: NOT HARD AT ALL

## 2021-07-21 NOTE — PROGRESS NOTES
Subjective  Luigi Guzman, 43 y.o. male presents today with:  Chief Complaint   Patient presents with    Skin Problem     pt states that a few yrs ago cream given for spot on RT arm Lortizone    Eye Pain     Stye in LT eye, painful     Diarrhea     pt states that since Dr Carrol Kidd switched        HPI  P[facility] to [facility] for multiple concerns: skin, left eye and diarrhea/fatigue  States skin concern he has had previously   Responded to Lotrisone cream   Affected area is the right arm   Denies drainage from rash  C/o itching     Stye present to left eye  The stye is located left, lower eyelid, medial  Denies drainage from area  Area with erythema and swelling   Tender to touch     Fatigue and diarrhea after increasing Lamictal dosing   Multiple episodes of loose stool throughout the day   Feels drowsiness is excessive. Sleeping 15 hours a day   Wakes up some mornings and has to go back to sleep even after drinking coffee   His wife concerned he is sleeping so much              Past Medical History:   Diagnosis Date    Asthma     Dermatitis     Fatty liver     Gastroesophageal reflux disease with hiatal hernia     Hypertension     Obesity (BMI 30-39. 9)       Past Surgical History:   Procedure Laterality Date    LYMPH NODE BIOPSY      surgery due to cat scratch disease age 1   Edin Robel WISDOM TOOTH EXTRACTION       No family history on file. Review of Systems   Constitutional: Positive for activity change and fatigue. Negative for appetite change, chills, diaphoresis and fever. HENT: Negative for congestion, rhinorrhea and sore throat. Respiratory: Negative for cough. Cardiovascular: Negative for chest pain and palpitations. Gastrointestinal: Negative for abdominal pain, diarrhea and nausea. Musculoskeletal: Negative for arthralgias, myalgias, neck pain and neck stiffness. Skin: Positive for rash. Neurological: Negative for dizziness, weakness, light-headedness and headaches. PMH, Surgical Hx, Family Hx, and Social Hx reviewed and updated. Health Maintenance reviewed. Objective  Vitals:    07/21/21 1230   BP: 122/80   Pulse: 74   Temp: 97.7 °F (36.5 °C)   SpO2: 100%   Weight: 232 lb 6.4 oz (105.4 kg)   Height: 5' 8\" (1.727 m)     BP Readings from Last 3 Encounters:   07/21/21 122/80   06/02/21 138/82   05/05/21 116/80     Wt Readings from Last 3 Encounters:   07/21/21 232 lb 6.4 oz (105.4 kg)   06/02/21 230 lb (104.3 kg)   05/05/21 234 lb 6.4 oz (106.3 kg)           Physical Exam  Vitals reviewed. Constitutional:       General: He is not in acute distress. Appearance: Normal appearance. He is not ill-appearing, toxic-appearing or diaphoretic. HENT:      Head: Normocephalic. Eyes:      General: Vision grossly intact. Gaze aligned appropriately. No allergic shiner or visual field deficit. Left eye: Hordeolum present. No foreign body or discharge. Conjunctiva/sclera: Conjunctivae normal.      Left eye: Left conjunctiva is not injected. No exudate. Cardiovascular:      Rate and Rhythm: Normal rate. Pulmonary:      Effort: Pulmonary effort is normal.      Breath sounds: Normal breath sounds. Abdominal:      Palpations: Abdomen is soft. Musculoskeletal:         General: Normal range of motion. Cervical back: Normal range of motion. Skin:     General: Skin is warm. Coloration: Skin is not pale. Neurological:      General: No focal deficit present. Mental Status: He is alert and oriented to person, place, and time. Psychiatric:         Mood and Affect: Mood normal.             Assessment & Plan    Diagnosis Orders   1. Hordeolum externum of left lower eyelid  tobramycin (TOBREX) 0.3 % ophthalmic solution   2. Tinea     3. Side effect of medication     4. Diarrhea, unspecified type  loperamide (RA ANTI-DIARRHEAL) 2 MG capsule     No orders of the defined types were placed in this encounter.     Orders Placed This Encounter Medications    tobramycin (TOBREX) 0.3 % ophthalmic solution     Sig: Place 1 drop into both eyes every 4 hours for 10 days     Dispense:  1 Bottle     Refill:  0    clotrimazole-betamethasone (LOTRISONE) 1-0.05 % cream     Sig: Apply topically 2 times daily. Dispense:  45 g     Refill:  5    loperamide (RA ANTI-DIARRHEAL) 2 MG capsule     Sig: Take 1 capsule by mouth 4 times daily as needed for Diarrhea     Dispense:  30 capsule     Refill:  0     Return for follow up with PCP. Reviewed with the patient: current clinical status & medications. Discussed patient reducing Lamictal to 50 mg until follow up with Dr. Carrol Kidd. Side effects, adverse effects of the medications prescribed today, as well as treatment plan/rationale and result expectations have been discussed with the patient who expressed understanding. Close follow up to evaluate treatment results and for coordination of care. I have reviewed the patient's medical history in detail and updated the computerized patient record.         ELPIDIO Sim NP

## 2021-07-24 ASSESSMENT — ENCOUNTER SYMPTOMS
NAUSEA: 0
COUGH: 0
DIARRHEA: 0
SORE THROAT: 0
ABDOMINAL PAIN: 0
RHINORRHEA: 0

## 2021-07-24 ASSESSMENT — VISUAL ACUITY: OU: 1

## 2021-07-29 ENCOUNTER — OFFICE VISIT (OUTPATIENT)
Dept: FAMILY MEDICINE CLINIC | Age: 36
End: 2021-07-29
Payer: COMMERCIAL

## 2021-07-29 VITALS
HEIGHT: 68 IN | DIASTOLIC BLOOD PRESSURE: 66 MMHG | HEART RATE: 73 BPM | SYSTOLIC BLOOD PRESSURE: 120 MMHG | BODY MASS INDEX: 35.6 KG/M2 | WEIGHT: 234.9 LBS | TEMPERATURE: 97.1 F | OXYGEN SATURATION: 97 %

## 2021-07-29 DIAGNOSIS — F32.A ANXIETY AND DEPRESSION: Primary | ICD-10-CM

## 2021-07-29 DIAGNOSIS — F41.9 ANXIETY AND DEPRESSION: Primary | ICD-10-CM

## 2021-07-29 DIAGNOSIS — N52.9 ERECTILE DYSFUNCTION, UNSPECIFIED ERECTILE DYSFUNCTION TYPE: ICD-10-CM

## 2021-07-29 PROCEDURE — 99213 OFFICE O/P EST LOW 20 MIN: CPT | Performed by: FAMILY MEDICINE

## 2021-07-29 RX ORDER — TADALAFIL 5 MG/1
5 TABLET ORAL DAILY
Qty: 90 TABLET | Refills: 1 | Status: SHIPPED | OUTPATIENT
Start: 2021-07-29 | End: 2021-10-15 | Stop reason: SDUPTHER

## 2021-07-29 RX ORDER — LAMOTRIGINE 100 MG/1
100 TABLET, EXTENDED RELEASE ORAL DAILY
Qty: 30 TABLET | Refills: 3 | Status: SHIPPED | OUTPATIENT
Start: 2021-07-29 | End: 2021-08-30

## 2021-07-29 NOTE — PROGRESS NOTES
Chief Complaint   Patient presents with    Diarrhea     from recent med changed, she cut the mediaction in half, following up, discuss omeprzole        HPI:  Debra Ball is a 43 y.o. male    Follow up  Med discussion     Weight stable     lamictal at 100mg was causing diarrhea  Has cut in half  Diarrhea improved some, but still there  Normally in the morning, bad. Better throughout the day     Wt Readings from Last 3 Encounters:   07/29/21 234 lb 14.4 oz (106.5 kg)   07/21/21 232 lb 6.4 oz (105.4 kg)   06/02/21 230 lb (104.3 kg)         Past Medical History:   Diagnosis Date    Asthma     Dermatitis     Fatty liver     Gastroesophageal reflux disease with hiatal hernia     Hypertension     Obesity (BMI 30-39. 9)      Past Surgical History:   Procedure Laterality Date    LYMPH NODE BIOPSY      surgery due to cat scratch disease age 1   Connye Sole WISDOM TOOTH EXTRACTION       History reviewed. No pertinent family history. Social History     Socioeconomic History    Marital status:      Spouse name: None    Number of children: 1    Years of education: None    Highest education level: None   Occupational History    None   Tobacco Use    Smoking status: Never Smoker    Smokeless tobacco: Never Used   Substance and Sexual Activity    Alcohol use: No     Alcohol/week: 0.0 standard drinks    Drug use: No    Sexual activity: None   Other Topics Concern    None   Social History Narrative    None     Social Determinants of Health     Financial Resource Strain: Low Risk     Difficulty of Paying Living Expenses: Not hard at all   Food Insecurity: No Food Insecurity    Worried About Running Out of Food in the Last Year: Never true    Michelle of Food in the Last Year: Never true   Transportation Needs: No Transportation Needs    Lack of Transportation (Medical): No    Lack of Transportation (Non-Medical):  No   Physical Activity:     Days of Exercise per Week:     Minutes of Exercise per Session: Stress:     Feeling of Stress :    Social Connections:     Frequency of Communication with Friends and Family:     Frequency of Social Gatherings with Friends and Family:     Attends [employer] Services:     Active Member of Clubs or Organizations:     Attends Club or Organization Meetings:     Marital Status:    Intimate Partner Violence:     Fear of Current or Ex-Partner:     Emotionally Abused:     Physically Abused:     Sexually Abused:      Current Outpatient Medications   Medication Sig Dispense Refill    tadalafil (CIALIS) 5 MG tablet Take 1 tablet by mouth daily 90 tablet 1    lamoTRIgine (LAMICTAL XR) 100 MG TB24 extended release tablet Take 1 tablet by mouth daily 30 tablet 3    omeprazole (PRILOSEC) 40 MG delayed release capsule       clotrimazole-betamethasone (LOTRISONE) 1-0.05 % cream Apply topically 2 times daily. 45 g 5    testosterone cypionate (DEPOTESTOTERONE CYPIONATE) 200 MG/ML injection INJECT 1 ML INTO THE MUSCLE EVERY 14 DAYS  DAYS. 6 mL 1    lamoTRIgine (LAMICTAL) 100 MG tablet Take 1 tablet by mouth every evening 90 tablet 3    metoprolol (LOPRESSOR) 100 MG tablet TAKE 1 TABLET BY MOUTH TWICE A  tablet 1    albuterol (PROVENTIL) (2.5 MG/3ML) 0.083% nebulizer solution Take 3 mLs by nebulization every 6 hours as needed for Wheezing 120 each 3    DULoxetine (CYMBALTA) 60 MG extended release capsule TAKE 1 CAPSULE BY MOUTH EVERY DAY 90 capsule 2    EPINEPHrine (EPIPEN 2-AUGUSTUS) 0.3 MG/0.3ML SOAJ injection Inject 0.3 mLs into the muscle once as needed (allergic reaction/bee sting) Use as directed for allergic reaction  Dispense 1 two pack (epi pen) 1 each 0     No current facility-administered medications for this visit.      Allergies   Allergen Reactions    Amlodipine Swelling    Bee Venom     Compazine [Prochlorperazine Maleate] Swelling    Lisinopril Hives and Swelling     Skin tightens on his face and gets hives       Review of Systems:   General ROS: no f/c/n/v  Respiratory ROS: no cough, shortness of breath, or wheezing  Cardiovascular ROS: no chest pain or dyspnea on exertion  Gastrointestinal ROS: no abdominal pain, change in bowel habits, or black or bloody stools  Genito-Urinary ROS: per HPI  Musculoskeletal ROS: negative for - gait disturbance, joint pain or joint stiffness  Neurological ROS: negative for - behavioral changes, memory loss, numbness/tingling, tremors or weakness    In general patient otherwise reports feeling well. Physical Exam:  /66 (Site: Right Upper Arm)   Pulse 73   Temp 97.1 °F (36.2 °C)   Ht 5' 8\" (1.727 m)   Wt 234 lb 14.4 oz (106.5 kg)   SpO2 97%   BMI 35.72 kg/m²     Gen: Well, NAD, Alert, Oriented x 3   HEENT: EOMI, eyes clear, MMM  Skin: no rash or jaundice  Lungs: CTA B w/out Rales/Wheezes/Rhonchi, Good respiratory effort   Heart: RRR, S1S2, w/out M/R/G, non-displaced PMI   Psych: generally euthymic here today with intact insight and judgement, no SI/HI  Neuro: Neurovascularly intact w/ Sensory/Motor intact UE/LE Bilaterally. Lab Results   Component Value Date    WBC 9.1 10/06/2020    HGB 17.5 10/06/2020    HCT 53.2 (H) 10/06/2020     10/06/2020    CHOL 213 (H) 10/06/2020    TRIG 139 10/06/2020    HDL 54 10/06/2020    ALT 58 (H) 10/06/2020    AST 27 10/06/2020     10/06/2020    K 5.0 (H) 10/06/2020     10/06/2020    CREATININE 0.74 10/06/2020    BUN 14 10/06/2020    CO2 23 10/06/2020    TSH 2.140 10/06/2020    PSA 0.54 08/07/2019    LABA1C 5.6 12/23/2020         A&P   Diagnosis Orders   1. Anxiety and depression  Murali Patel MD, [place]    lamoTRIgine (LAMICTAL XR) 100 MG TB24 extended release tablet   2.  Erectile dysfunction, unspecified erectile dysfunction type  tadalafil (CIALIS) 5 MG tablet       Change to XR lamictal     Loperamide nightly for now     Continue omeprazole    Re-referral to Dr. Gerhard Patricia MD

## 2021-08-13 ENCOUNTER — OFFICE VISIT (OUTPATIENT)
Dept: FAMILY MEDICINE CLINIC | Age: 36
End: 2021-08-13
Payer: COMMERCIAL

## 2021-08-13 VITALS
HEART RATE: 74 BPM | OXYGEN SATURATION: 96 % | TEMPERATURE: 96.4 F | SYSTOLIC BLOOD PRESSURE: 118 MMHG | DIASTOLIC BLOOD PRESSURE: 80 MMHG | BODY MASS INDEX: 34.96 KG/M2 | HEIGHT: 69 IN | WEIGHT: 236 LBS

## 2021-08-13 DIAGNOSIS — Z80.7 FAMILY HISTORY OF LYMPHOMA: ICD-10-CM

## 2021-08-13 DIAGNOSIS — R59.0 LYMPHADENOPATHY, CERVICAL: Primary | ICD-10-CM

## 2021-08-13 PROCEDURE — 99213 OFFICE O/P EST LOW 20 MIN: CPT | Performed by: FAMILY MEDICINE

## 2021-08-13 RX ORDER — METHYLPREDNISOLONE 4 MG/1
TABLET ORAL
Qty: 1 KIT | Refills: 0 | Status: SHIPPED | OUTPATIENT
Start: 2021-08-13 | End: 2021-10-19 | Stop reason: ALTCHOICE

## 2021-08-13 RX ORDER — CEFDINIR 300 MG/1
300 CAPSULE ORAL 2 TIMES DAILY
Qty: 20 CAPSULE | Refills: 0 | Status: SHIPPED | OUTPATIENT
Start: 2021-08-13 | End: 2021-08-23

## 2021-08-13 NOTE — PROGRESS NOTES
Chief Complaint   Patient presents with    Mass     in neck, x 1.5 week, sore        HPI:  Cal Perez is a 43 y.o. male    Lump/mass in neck for last week and a half  Reports tender/soreness      Wt Readings from Last 3 Encounters:   08/13/21 236 lb (107 kg)   07/29/21 234 lb 14.4 oz (106.5 kg)   07/21/21 232 lb 6.4 oz (105.4 kg)         Past Medical History:   Diagnosis Date    Asthma     Dermatitis     Fatty liver     Gastroesophageal reflux disease with hiatal hernia     Hypertension     Obesity (BMI 30-39. 9)      Past Surgical History:   Procedure Laterality Date    LYMPH NODE BIOPSY      surgery due to cat scratch disease age 1   [employer] WISDOM TOOTH EXTRACTION       History reviewed. No pertinent family history. Social History     Socioeconomic History    Marital status:      Spouse name: None    Number of children: 1    Years of education: None    Highest education level: None   Occupational History    None   Tobacco Use    Smoking status: Never Smoker    Smokeless tobacco: Never Used   Substance and Sexual Activity    Alcohol use: No     Alcohol/week: 0.0 standard drinks    Drug use: No    Sexual activity: None   Other Topics Concern    None   Social History Narrative    None     Social Determinants of Health     Financial Resource Strain: Low Risk     Difficulty of Paying Living Expenses: Not hard at all   Food Insecurity: No Food Insecurity    Worried About Running Out of Food in the Last Year: Never true    Michelle of Food in the Last Year: Never true   Transportation Needs: No Transportation Needs    Lack of Transportation (Medical): No    Lack of Transportation (Non-Medical):  No   Physical Activity:     Days of Exercise per Week:     Minutes of Exercise per Session:    Stress:     Feeling of Stress :    Social Connections:     Frequency of Communication with Friends and Family:     Frequency of Social Gatherings with Friends and Family:     Attends Yazdanism Services:     Active Member of Clubs or Organizations:     Attends Club or Organization Meetings:     Marital Status:    Intimate Partner Violence:     Fear of Current or Ex-Partner:     Emotionally Abused:     Physically Abused:     Sexually Abused:      Current Outpatient Medications   Medication Sig Dispense Refill    cefdinir (OMNICEF) 300 MG capsule Take 1 capsule by mouth 2 times daily for 10 days 20 capsule 0    methylPREDNISolone (MEDROL DOSEPACK) 4 MG tablet Take by mouth. 1 kit 0    tadalafil (CIALIS) 5 MG tablet Take 1 tablet by mouth daily 90 tablet 1    lamoTRIgine (LAMICTAL XR) 100 MG TB24 extended release tablet Take 1 tablet by mouth daily 30 tablet 3    omeprazole (PRILOSEC) 40 MG delayed release capsule       clotrimazole-betamethasone (LOTRISONE) 1-0.05 % cream Apply topically 2 times daily. 45 g 5    testosterone cypionate (DEPOTESTOTERONE CYPIONATE) 200 MG/ML injection INJECT 1 ML INTO THE MUSCLE EVERY 14 DAYS  DAYS. 6 mL 1    metoprolol (LOPRESSOR) 100 MG tablet TAKE 1 TABLET BY MOUTH TWICE A  tablet 1    albuterol (PROVENTIL) (2.5 MG/3ML) 0.083% nebulizer solution Take 3 mLs by nebulization every 6 hours as needed for Wheezing 120 each 3    DULoxetine (CYMBALTA) 60 MG extended release capsule TAKE 1 CAPSULE BY MOUTH EVERY DAY 90 capsule 2    EPINEPHrine (EPIPEN 2-AUGUSTUS) 0.3 MG/0.3ML SOAJ injection Inject 0.3 mLs into the muscle once as needed (allergic reaction/bee sting) Use as directed for allergic reaction  Dispense 1 two pack (epi pen) 1 each 0     No current facility-administered medications for this visit.      Allergies   Allergen Reactions    Amlodipine Swelling    Bee Venom     Compazine [Prochlorperazine Maleate] Swelling    Lisinopril Hives and Swelling     Skin tightens on his face and gets hives       Review of Systems:   General ROS: no f/c/n/v  Respiratory ROS: no cough, shortness of breath, or wheezing  Cardiovascular ROS: no chest pain or dyspnea on exertion  Gastrointestinal ROS: no abdominal pain, change in bowel habits, or black or bloody stools  Genito-Urinary ROS: per HPI  Musculoskeletal ROS: negative for - gait disturbance, joint pain or joint stiffness  Neurological ROS: negative for - behavioral changes, memory loss, numbness/tingling, tremors or weakness    In general patient otherwise reports feeling well. Physical Exam:  /80 (Site: Right Upper Arm)   Pulse 74   Temp 96.4 °F (35.8 °C)   Ht 5' 9\" (1.753 m)   Wt 236 lb (107 kg)   SpO2 96%   BMI 34.85 kg/m²     Gen: Well, NAD, Alert, Oriented x 3   HEENT: EOMI, eyes clear, MMM  Skin: no rash or jaundice  Neck: prominent likely cervical lymph node right side, firm, mild tenderness, moveable   Lungs: CTA B w/out Rales/Wheezes/Rhonchi, Good respiratory effort   Heart: RRR, S1S2, w/out M/R/G, non-displaced PMI   Psych: generally euthymic here today with intact insight and judgement, no SI/HI  Neuro: Neurovascularly intact w/ Sensory/Motor intact UE/LE Bilaterally. Lab Results   Component Value Date    WBC 9.1 10/06/2020    HGB 17.5 10/06/2020    HCT 53.2 (H) 10/06/2020     10/06/2020    CHOL 213 (H) 10/06/2020    TRIG 139 10/06/2020    HDL 54 10/06/2020    ALT 58 (H) 10/06/2020    AST 27 10/06/2020     10/06/2020    K 5.0 (H) 10/06/2020     10/06/2020    CREATININE 0.74 10/06/2020    BUN 14 10/06/2020    CO2 23 10/06/2020    TSH 2.140 10/06/2020    PSA 0.54 08/07/2019    LABA1C 5.6 12/23/2020         A&P   Diagnosis Orders   1. Lymphadenopathy, cervical  US HEAD NECK SOFT TISSUE THYROID    cefdinir (OMNICEF) 300 MG capsule    methylPREDNISolone (MEDROL DOSEPACK) 4 MG tablet    CBC With Auto Differential   2.  Family history of lymphoma  CBC With Auto Differential     meds as ordered  Check CBC   U/s  Family hx lymphoma  ENT if needed           Zhang Coats MD

## 2021-08-20 ENCOUNTER — TELEPHONE (OUTPATIENT)
Dept: FAMILY MEDICINE CLINIC | Age: 36
End: 2021-08-20

## 2021-08-20 NOTE — TELEPHONE ENCOUNTER
[phone] pt ph.     Patient states the lump on his neck went away, and would like to know if he should still complete the ultrasound scheduled for 8/23/2021. Please Advise.

## 2021-08-23 DIAGNOSIS — K21.9 GASTRO-ESOPHAGEAL REFLUX DISEASE WITHOUT ESOPHAGITIS: ICD-10-CM

## 2021-08-23 RX ORDER — OMEPRAZOLE 40 MG/1
CAPSULE, DELAYED RELEASE ORAL
Qty: 90 CAPSULE | Refills: 2 | Status: SHIPPED | OUTPATIENT
Start: 2021-08-23 | End: 2022-04-11

## 2021-08-29 DIAGNOSIS — F32.A ANXIETY AND DEPRESSION: ICD-10-CM

## 2021-08-29 DIAGNOSIS — F41.9 ANXIETY AND DEPRESSION: ICD-10-CM

## 2021-08-30 RX ORDER — LAMOTRIGINE 100 MG/1
TABLET, EXTENDED RELEASE ORAL
Qty: 30 TABLET | Refills: 3 | Status: SHIPPED | OUTPATIENT
Start: 2021-08-30 | End: 2021-11-01

## 2021-08-30 NOTE — TELEPHONE ENCOUNTER
This patient does not currently have any appointments scheduled.   Recent Visits    08/13/2021 Lymphadenopathy, cervical   Rome Singh MD     Last fill 07/29/21

## 2021-09-08 ENCOUNTER — TELEPHONE (OUTPATIENT)
Dept: FAMILY MEDICINE CLINIC | Age: 36
End: 2021-09-08

## 2021-09-08 NOTE — TELEPHONE ENCOUNTER
----- Message from Michelle Wann sent at 9/7/2021  5:01 PM EDT -----  Subject: Message to Provider    QUESTIONS  Information for Provider? Pt calling please have Lavon Page contact   him ASAP ! We have tried multiple times and unable to get through, he also   states he has sent messages with no response.   ---------------------------------------------------------------------------  --------------  CALL BACK INFO  What is the best way for the office to contact you? OK to leave message on   voicemail  Preferred Call Back Phone Number?  [phone]  ---------------------------------------------------------------------------  --------------  SCRIPT ANSWERS  undefined

## 2021-09-09 ENCOUNTER — TELEPHONE (OUTPATIENT)
Dept: FAMILY MEDICINE CLINIC | Age: 36
End: 2021-09-09

## 2021-09-09 NOTE — TELEPHONE ENCOUNTER
----- Message from Raymond Orr sent at 9/7/2021  5:02 PM EDT -----  Subject: Message to Provider    QUESTIONS  Information for Provider? Pt calling please have Papo Lino contact   him ASAP ! We have tried multiple times and unable to get through, he also   states he has sent messages with no response.   ---------------------------------------------------------------------------  --------------  CALL BACK INFO  What is the best way for the office to contact you? OK to leave message on   voicemail  Preferred Call Back Phone Number? [phone]  ---------------------------------------------------------------------------  --------------  SCRIPT ANSWERS  Relationship to Patient?  Self

## 2021-09-09 NOTE — TELEPHONE ENCOUNTER
Spoke with Patient and informed him that  is not taking new patients, he was referred to [facility] [place], per referral. Patient,stated that he did not know anything about   [facility]. Navigator Line [phone] given to patient.

## 2021-10-15 DIAGNOSIS — E29.1 HYPOGONADISM IN MALE: ICD-10-CM

## 2021-10-15 DIAGNOSIS — N52.9 ERECTILE DYSFUNCTION, UNSPECIFIED ERECTILE DYSFUNCTION TYPE: ICD-10-CM

## 2021-10-15 RX ORDER — TESTOSTERONE CYPIONATE 200 MG/ML
200 INJECTION INTRAMUSCULAR
Qty: 6 ML | Refills: 1 | Status: SHIPPED | OUTPATIENT
Start: 2021-10-15 | End: 2022-05-03 | Stop reason: SDUPTHER

## 2021-10-15 RX ORDER — TADALAFIL 5 MG/1
5 TABLET ORAL DAILY
Qty: 90 TABLET | Refills: 1 | Status: SHIPPED | OUTPATIENT
Start: 2021-10-15 | End: 2022-05-03 | Stop reason: SDUPTHER

## 2021-10-18 ENCOUNTER — VIRTUAL VISIT (OUTPATIENT)
Dept: FAMILY MEDICINE CLINIC | Age: 36
End: 2021-10-18
Payer: COMMERCIAL

## 2021-10-18 ENCOUNTER — NURSE ONLY (OUTPATIENT)
Dept: FAMILY MEDICINE CLINIC | Age: 36
End: 2021-10-18

## 2021-10-18 ENCOUNTER — TELEPHONE (OUTPATIENT)
Dept: FAMILY MEDICINE CLINIC | Age: 36
End: 2021-10-18

## 2021-10-18 DIAGNOSIS — B34.9 VIRAL ILLNESS: Primary | ICD-10-CM

## 2021-10-18 DIAGNOSIS — R50.9 FEVER, UNSPECIFIED FEVER CAUSE: ICD-10-CM

## 2021-10-18 DIAGNOSIS — R11.2 INTRACTABLE VOMITING WITH NAUSEA, UNSPECIFIED VOMITING TYPE: ICD-10-CM

## 2021-10-18 LAB
INFLUENZA A ANTIBODY: NORMAL
INFLUENZA B ANTIBODY: NORMAL
Lab: NORMAL
PERFORMING INSTRUMENT: NORMAL
QC PASS/FAIL: NORMAL
SARS-COV-2, POC: NORMAL

## 2021-10-18 PROCEDURE — 87426 SARSCOV CORONAVIRUS AG IA: CPT | Performed by: NURSE PRACTITIONER

## 2021-10-18 PROCEDURE — 87804 INFLUENZA ASSAY W/OPTIC: CPT | Performed by: NURSE PRACTITIONER

## 2021-10-18 PROCEDURE — 99213 OFFICE O/P EST LOW 20 MIN: CPT | Performed by: NURSE PRACTITIONER

## 2021-10-18 RX ORDER — LAMOTRIGINE 100 MG/1
TABLET ORAL
COMMUNITY
Start: 2021-10-09 | End: 2021-10-18

## 2021-10-18 ASSESSMENT — ENCOUNTER SYMPTOMS
VOMITING: 1
CHEST TIGHTNESS: 0
WHEEZING: 0
SHORTNESS OF BREATH: 0
SORE THROAT: 1
COUGH: 1
RHINORRHEA: 0
DIARRHEA: 1
NAUSEA: 1
ABDOMINAL PAIN: 0

## 2021-10-18 NOTE — PROGRESS NOTES
TELEHEALTH EVALUATION -- Audio/Visual (During - public health emergency)    -   Silas Maxwell is a 43 y.o. male being evaluated by a Virtual Visit (video visit) encounter to address concerns as mentioned above. A caregiver was present when appropriate. Due to this being a TeleHealth encounter (During  public health emergency), evaluation of the following organ systems was limited: Vitals/Constitutional/EENT/Resp/CV/GI//MS/Neuro/Skin/Heme-Lymph-Imm. Pursuant to the emergency declaration under the [address] [place] [address], [address] [place] [address] authority and the [employer] Act, this Virtual Visit was conducted with patient's (and/or legal guardian's) consent, to reduce the patient's risk of exposure to COVID-19 and provide necessary medical care. The patient (and/or legal guardian) has also been advised to contact this office for worsening conditions or problems, and seek emergency medical treatment and/or call 911 if deemed necessary. Patient was contacted and agreed to proceed with a virtual visit via Telephone Visit  The risks and benefits of converting to a virtual visit were discussed in light of the current infectious disease epidemic. Patient also understood that insurance coverage and co-pays are up to their individual insurance plans. Patient was located at their home. Provider was located at their office.      10/18/2021  Silas Maxwell (:  1985) has requested an audio/video evaluation for the following concern(s):    HPI  VV audio for viral testing   #2 COVID-19 vaccine 21  Emesis all night last night   Tmax 100.3 yesterday and today  Symptoms began Saturday with h/a and chills   Fatigued   No appetite   Diarrhea   Dry cough   Dizzy  Intense h/a   Body aches   His wife became unwell also today           Review of Systems   Constitutional: Positive for activity change, appetite change, chills, diaphoresis, fatigue and fever. HENT: Positive for sore throat. Negative for congestion, ear pain and rhinorrhea. Respiratory: Positive for cough. Negative for chest tightness, shortness of breath and wheezing. Cardiovascular: Negative for chest pain and palpitations. Gastrointestinal: Positive for diarrhea, nausea and vomiting. Negative for abdominal pain. Musculoskeletal: Positive for myalgias. Skin: Negative for rash. Neurological: Positive for dizziness and headaches. Negative for light-headedness. Psychiatric/Behavioral: Positive for sleep disturbance. Prior to Visit Medications    Medication Sig Taking? Authorizing Provider   ondansetron (ZOFRAN) 4 MG tablet Take 1 tablet by mouth 3 times daily as needed for Nausea or Vomiting Yes ELPIDIO Jerez NP   tadalafil (CIALIS) 5 MG tablet Take 1 tablet by mouth daily Yes Ponce Hester MD   testosterone cypionate (DEPOTESTOTERONE CYPIONATE) 200 MG/ML injection Inject 1 mL into the muscle every 14 days for 180 days. Yes Ponce Hester MD   lamoTRIgine (LAMICTAL XR) 100 MG TB24 extended release tablet TAKE 1 TABLET BY MOUTH EVERY DAY Yes Ponce Hester MD   omeprazole (PRILOSEC) 40 MG delayed release capsule TAKE 1 CAPSULE BY MOUTH EVERY DAY Yes Ponce Hester MD   methylPREDNISolone (MEDROL DOSEPACK) 4 MG tablet Take by mouth. Yes Ponce Hester MD   clotrimazole-betamethasone (LOTRISONE) 1-0.05 % cream Apply topically 2 times daily.  Yes ELPIDIO Jerez NP   metoprolol (LOPRESSOR) 100 MG tablet TAKE 1 TABLET BY MOUTH TWICE A DAY Yes Ponce Hester MD   albuterol (PROVENTIL) (2.5 MG/3ML) 0.083% nebulizer solution Take 3 mLs by nebulization every 6 hours as needed for Wheezing Yes Ponce Hester MD   DULoxetine (CYMBALTA) 60 MG extended release capsule TAKE 1 CAPSULE BY MOUTH EVERY DAY Yes Ponce Hester MD   EPINEPHrine (EPIPEN 2-AUGUSTUS) 0.3 MG/0.3ML SOAJ injection Inject 0.3 mLs into the muscle once as needed (allergic reaction/bee sting) Use as directed for allergic reaction  Dispense 1 two pack (epi pen)  Lorrie Colindres DO       Past Medical History:   Diagnosis Date    Asthma     Dermatitis     Fatty liver     Gastroesophageal reflux disease with hiatal hernia     Hypertension     Obesity (BMI 30-39. 9)      Past Surgical History:   Procedure Laterality Date    LYMPH NODE BIOPSY      surgery due to cat scratch disease age 1   [facility] WISDOM TOOTH EXTRACTION       Social History     Socioeconomic History    Marital status:      Spouse name: Not on file    Number of children: 1    Years of education: Not on file    Highest education level: Not on file   Occupational History    Not on file   Tobacco Use    Smoking status: Never Smoker    Smokeless tobacco: Never Used   Substance and Sexual Activity    Alcohol use: No     Alcohol/week: 0.0 standard drinks    Drug use: No    Sexual activity: Not on file   Other Topics Concern    Not on file   Social History Narrative    Not on file     Social Determinants of Health     Financial Resource Strain: Low Risk     Difficulty of Paying Living Expenses: Not hard at all   Food Insecurity: No Food Insecurity    Worried About [employer] in the Last Year: Never true    [facility] in the Last Year: Never true   Transportation Needs: No Transportation Needs    Lack of Transportation (Medical): No    Lack of Transportation (Non-Medical):  No   Physical Activity:     Days of Exercise per Week:     Minutes of Exercise per Session:    Stress:     Feeling of Stress :    Social Connections:     Frequency of Communication with Friends and Family:     Frequency of Social Gatherings with Friends and Family:     Attends Jainism Services:     Active Member of Clubs or Organizations:     Attends Club or Organization Meetings:     Marital Status:    Intimate Partner Violence:     Fear of Current or Ex-Partner:     Emotionally Abused:     Physically Abused: Order Specific Question:   Admitted to ICU for COVID-19? Answer:   No     Order Specific Question:   Employed in healthcare setting? Answer:   No     Order Specific Question:   Resident in a congregate (group) care setting? Answer:   No     Order Specific Question:   Pregnant: Answer:   No     Order Specific Question:   Previously tested for COVID-19? Answer: Yes    POCT Influenza A/B    POCT COVID-19, Antigen     Order Specific Question:   Is this test for diagnosis or screening? Answer:   Diagnosis of ill patient     Order Specific Question:   Symptomatic for COVID-19 as defined by CDC? Answer:   Yes     Order Specific Question:   Date of Symptom Onset     Answer:   10/16/2021     Order Specific Question:   Hospitalized for COVID-19? Answer:   No     Order Specific Question:   Admitted to ICU for COVID-19? Answer:   No     Order Specific Question:   Employed in healthcare setting? Answer:   No     Order Specific Question:   Resident in a congregate (group) care setting? Answer:   No     Order Specific Question:   Pregnant: Answer:   No     Order Specific Question:   Previously tested for COVID-19? Answer:   Yes     Orders Placed This Encounter   Medications    ondansetron (ZOFRAN) 4 MG tablet     Sig: Take 1 tablet by mouth 3 times daily as needed for Nausea or Vomiting     Dispense:  15 tablet     Refill:  0     Medications Discontinued During This Encounter   Medication Reason    lamoTRIgine (LAMICTAL) 100 MG tablet LIST CLEANUP         If you experience any of the red flag s/s, seek care at the ER          Reviewed with the patient: current clinical status. Discussed with patient COVID-19 s/s. Pt aware to remain home until he hears from us about the PCR result. Pt instructed on red flag s/s to go to the ER for or to call 911. Pt verbalized understanding. Will update pt with result when it is available.   Patient aware rapid antigen COVID-19 testing and Flu testing are negative. Reviewed with the patient: current clinical status & medications. Side effects, adverse effects of the medication prescribed today, as well as treatment plan/rationale and result expectations have been discussed with the patient who expressed understanding. When to call for help  Call 911 anytime you think you may need emergency care. For example, call if:  · You have severe trouble breathing. · You have severe dehydration. .          Close follow up to evaluate treatment results and for coordination of care. I have reviewed the patient's medical history in detail and updated the computerized patient record. Patient identification was verified at the start of the visit: Yes  Total time spent on this encounter: 20 minutes  >50% of 20 minutes was spent spent on counseling, answering questions, instructions on meds & testing & coordinating the care based on my plan and assessment as noted. --ELPIDIO Mancilla NP on 10/19/2021 at 1:07 AM    An electronic signature was used to authenticate this note.

## 2021-10-19 ENCOUNTER — NURSE ONLY (OUTPATIENT)
Dept: FAMILY MEDICINE CLINIC | Age: 36
End: 2021-10-19

## 2021-10-19 ENCOUNTER — VIRTUAL VISIT (OUTPATIENT)
Dept: FAMILY MEDICINE CLINIC | Age: 36
End: 2021-10-19
Payer: COMMERCIAL

## 2021-10-19 DIAGNOSIS — J22 LOWER RESPIRATORY INFECTION (E.G., BRONCHITIS, PNEUMONIA, PNEUMONITIS, PULMONITIS): Primary | ICD-10-CM

## 2021-10-19 PROCEDURE — 99213 OFFICE O/P EST LOW 20 MIN: CPT | Performed by: NURSE PRACTITIONER

## 2021-10-19 RX ORDER — ONDANSETRON 4 MG/1
4 TABLET, FILM COATED ORAL 3 TIMES DAILY PRN
Qty: 15 TABLET | Refills: 0 | Status: SHIPPED | OUTPATIENT
Start: 2021-10-19 | End: 2021-11-01

## 2021-10-19 RX ORDER — AZITHROMYCIN 250 MG/1
250 TABLET, FILM COATED ORAL SEE ADMIN INSTRUCTIONS
Qty: 6 TABLET | Refills: 0 | Status: SHIPPED | OUTPATIENT
Start: 2021-10-19 | End: 2021-10-24

## 2021-10-19 RX ORDER — METHYLPREDNISOLONE 4 MG/1
TABLET ORAL
Qty: 1 KIT | Refills: 0 | Status: SHIPPED | OUTPATIENT
Start: 2021-10-19 | End: 2021-10-25

## 2021-10-19 ASSESSMENT — ENCOUNTER SYMPTOMS
NAUSEA: 1
CHEST TIGHTNESS: 1
VOMITING: 1
WHEEZING: 1
SHORTNESS OF BREATH: 1
COUGH: 1

## 2021-10-19 NOTE — TELEPHONE ENCOUNTER
Called Pt. Let him know of the 2nd rapid negative covid results. States that since he is not positive for covid he would like something sent in for a chest cold. States he feels worse today. Please advise. He seen [facility] yesterday. We were supposed to do a PCR test not another rapid.      Sent this to [facility] and [place]

## 2021-10-19 NOTE — TELEPHONE ENCOUNTER
Called pt to let him know Abdelrahman Blanchard is not in office today. The best we could do is get him scheduled for a virtual visit to get medications. Which he agreed to do.

## 2021-10-19 NOTE — PROGRESS NOTES
TELEHEALTH EVALUATION -- Audio/Visual (During VRPIE-39 public health emergency)    -   Silas Maxwell is a 43 y.o. male being evaluated by a Virtual Visit (video visit) encounter to address concerns as mentioned above. A caregiver was present when appropriate. Due to this being a TeleHealth encounter (During HRKPT-58 public health emergency), evaluation of the following organ systems was limited: Vitals/Constitutional/EENT/Resp/CV/GI//MS/Neuro/Skin/Heme-Lymph-Imm. Pursuant to the emergency declaration under the [address] [place] [address], [address] [place] [address] authority and the [employer] Act, this Virtual Visit was conducted with patient's (and/or legal guardian's) consent, to reduce the patient's risk of exposure to COVID-19 and provide necessary medical care. The patient (and/or legal guardian) has also been advised to contact this office for worsening conditions or problems, and seek emergency medical treatment and/or call 911 if deemed necessary. Patient was contacted and agreed to proceed with a virtual visit via [email or web address]. me  The risks and benefits of converting to a virtual visit were discussed in light of the current infectious disease epidemic. Patient also understood that insurance coverage and co-pays are up to their individual insurance plans. Patient was located at their home. Provider was located at their office.      10/19/2021  Silas Maxwell (:  1985) has requested an audio/video evaluation for the following concern(s):    HPI    Today is day 4 of symptoms  Not getting better   A little productive cough  Fever gone now   tested for influenza and covid and were neg  He had another rapid covid again today and it was negaive     At first he was vomitting   Was also this AM  Chest feels tight   Hard to breath  He has a nebulizer and had to use it a couple times today  In the middle of the night couldn't breath  Worse all day   He has asthma  He does not smoke   Fever is gone   He took mucincx  Ate Carrots and potatoes and held it down   He drank some water  He had diarrhea and it went away in the middle of night   He has zofran already             Review of Systems   Constitutional: Positive for fever (gone now). Negative for chills and fatigue. HENT: Negative for congestion, rhinorrhea, sore throat and trouble swallowing. Respiratory: Positive for cough, chest tightness, shortness of breath and wheezing. Gastrointestinal: Positive for diarrhea, nausea and vomiting. Musculoskeletal: Negative for myalgias. Skin: Negative for rash. Neurological: Negative for dizziness, light-headedness and headaches. Psychiatric/Behavioral: Negative for agitation, confusion and hallucinations. Prior to Visit Medications    Medication Sig Taking? Authorizing Provider   ondansetron (ZOFRAN) 4 MG tablet Take 1 tablet by mouth 3 times daily as needed for Nausea or Vomiting Yes ELPIDIO Beltran NP   azithromycin (ZITHROMAX) 250 MG tablet Take 1 tablet by mouth See Admin Instructions for 5 days 500mg on day 1 followed by 250mg on days 2 - 5 Yes ELPIDIO Higgins CNP   methylPREDNISolone (MEDROL DOSEPACK) 4 MG tablet Take by mouth. Yes ELPIDIO Higgins CNP   tadalafil (CIALIS) 5 MG tablet Take 1 tablet by mouth daily Yes Tabby Yates MD   testosterone cypionate (DEPOTESTOTERONE CYPIONATE) 200 MG/ML injection Inject 1 mL into the muscle every 14 days for 180 days. Yes Tabby Yates MD   lamoTRIgine (LAMICTAL XR) 100 MG TB24 extended release tablet TAKE 1 TABLET BY MOUTH EVERY DAY Yes Tabby Yates MD   omeprazole (PRILOSEC) 40 MG delayed release capsule TAKE 1 CAPSULE BY MOUTH EVERY DAY Yes Tabby Yates MD   clotrimazole-betamethasone (LOTRISONE) 1-0.05 % cream Apply topically 2 times daily.  Yes ELPIDIO Beltran NP   metoprolol [place]uy Quiroga) 100 MG tablet TAKE 1 TABLET BY MOUTH TWICE A DAY Yes Antonio Cannon MD   albuterol (PROVENTIL) (2.5 MG/3ML) 0.083% nebulizer solution Take 3 mLs by nebulization every 6 hours as needed for Wheezing Yes Antonio Cannon MD   DULoxetine (CYMBALTA) 60 MG extended release capsule TAKE 1 CAPSULE BY MOUTH EVERY DAY Yes Antonio Cannon MD   EPINEPHrine (EPIPEN 2-AUGUSTUS) 0.3 MG/0.3ML SOAJ injection Inject 0.3 mLs into the muscle once as needed (allergic reaction/bee sting) Use as directed for allergic reaction  Dispense 1 two pack (epi pen)  Conrado Lew DO       Past Medical History:   Diagnosis Date    Asthma     Dermatitis     Fatty liver     Gastroesophageal reflux disease with hiatal hernia     Hypertension     Obesity (BMI 30-39. 9)      Past Surgical History:   Procedure Laterality Date    LYMPH NODE BIOPSY      surgery due to cat scratch disease age 1   [place] WISDOM TOOTH EXTRACTION       Social History     Socioeconomic History    Marital status:      Spouse name: Not on file    Number of children: 1    Years of education: Not on file    Highest education level: Not on file   Occupational History    Not on file   Tobacco Use    Smoking status: Never Smoker    Smokeless tobacco: Never Used   Substance and Sexual Activity    Alcohol use: No     Alcohol/week: 0.0 standard drinks    Drug use: No    Sexual activity: Not on file   Other Topics Concern    Not on file   Social History Narrative    Not on file     Social Determinants of Health     Financial Resource Strain: Low Risk     Difficulty of Paying Living Expenses: Not hard at all   Food Insecurity: No Food Insecurity    Worried About [address] in the Last Year: Never true    [facility] in the Last Year: Never true   Transportation Needs: No Transportation Needs    Lack of Transportation (Medical): No    Lack of Transportation (Non-Medical):  No   Physical Activity:     Days of Exercise per Week:     Minutes of Exercise per Session: [x] No gaze palsy        [] Abnormal-         Skin:        [x] No significant exanthematous lesions or discoloration noted on facial skin         [] Abnormal-            Psychiatric:       [x] Normal Affect [x] No Hallucinations        [] Abnormal-     Other pertinent observable physical exam findings-   Results for orders placed or performed in visit on 10/18/21   POCT Influenza A/B   Result Value Ref Range    Influenza A Ab neg     Influenza B Ab neg    POCT COVID-19, Antigen   Result Value Ref Range    SARS-COV-2, POC Not-Detected Not Detected    Lot Number [id number]     QC Pass/Fail P     Performing Instrument BD Veritor        ASSESSMENT/PLAN:            Assessment & Plan   Sarbjit Delacruz was seen today for chest congestion, cough, headache and fever. Diagnoses and all orders for this visit:    Lower respiratory infection (e.g., bronchitis, pneumonia, pneumonitis, pulmonitis)  -     azithromycin (ZITHROMAX) 250 MG tablet; Take 1 tablet by mouth See Admin Instructions for 5 days 500mg on day 1 followed by 250mg on days 2 - 5  -     methylPREDNISolone (MEDROL DOSEPACK) 4 MG tablet; Take by mouth. No orders of the defined types were placed in this encounter. Orders Placed This Encounter   Medications    azithromycin (ZITHROMAX) 250 MG tablet     Sig: Take 1 tablet by mouth See Admin Instructions for 5 days 500mg on day 1 followed by 250mg on days 2 - 5     Dispense:  6 tablet     Refill:  0    methylPREDNISolone (MEDROL DOSEPACK) 4 MG tablet     Sig: Take by mouth. Dispense:  1 kit     Refill:  0     Medications Discontinued During This Encounter   Medication Reason    methylPREDNISolone (MEDROL DOSEPACK) 4 MG tablet Therapy completed     Return if symptoms worsen or fail to improve. Reviewed with the patient: current clinical status, medications, activities and diet.      Side effects, adverse effects of the medication prescribed today, as well as treatment plan/ rationale and result expectations have been discussed with the patient who expresses understanding and desires to proceed. Close follow up to evaluate treatment results and for coordination of care. I have reviewed the patient's medical history in detail and updated the computerized patient record. Patient identification was verified at the start of the visit: Yes    Total time spent on this encounter: Not billed by time      --ELPIDIO Badillo CNP on 10/24/2021 at 10:53 PM    An electronic signature was used to authenticate this note.

## 2021-10-19 NOTE — TELEPHONE ENCOUNTER
Pt calling. Wants to know his second rapid results. States per dr. Jennifer Townsend his second test was supposed to be rapid not pcr. Pt asked to speak with JESSE.

## 2021-10-19 NOTE — TELEPHONE ENCOUNTER
Pt called us back again asking for his results. I told him as soon as Mikala Kevin is free she will call him. He was upset because he had to wait all day. He asked for our managers name and if Mateusz was still here. I gave the message to Mikala Kevin and she is currently speaking with pt.

## 2021-10-21 ENCOUNTER — NURSE ONLY (OUTPATIENT)
Dept: FAMILY MEDICINE CLINIC | Age: 36
End: 2021-10-21
Payer: COMMERCIAL

## 2021-10-21 DIAGNOSIS — B34.9 VIRAL ILLNESS: Primary | ICD-10-CM

## 2021-10-21 DIAGNOSIS — R50.9 FEVER, UNSPECIFIED FEVER CAUSE: ICD-10-CM

## 2021-10-21 DIAGNOSIS — B34.9 VIRAL ILLNESS: ICD-10-CM

## 2021-10-23 LAB
SARS-COV-2: NOT DETECTED
SOURCE: NORMAL

## 2021-10-24 ASSESSMENT — ENCOUNTER SYMPTOMS
DIARRHEA: 1
SORE THROAT: 0
TROUBLE SWALLOWING: 0
RHINORRHEA: 0

## 2021-10-31 ENCOUNTER — VIRTUAL VISIT (OUTPATIENT)
Dept: PRIMARY CARE CLINIC | Age: 36
End: 2021-10-31
Payer: COMMERCIAL

## 2021-10-31 DIAGNOSIS — R06.09 DYSPNEA ON EXERTION: ICD-10-CM

## 2021-10-31 DIAGNOSIS — J40 BRONCHITIS: Primary | ICD-10-CM

## 2021-10-31 DIAGNOSIS — J45.901 MILD ASTHMA WITH EXACERBATION, UNSPECIFIED WHETHER PERSISTENT: ICD-10-CM

## 2021-10-31 DIAGNOSIS — R05.9 COUGH: ICD-10-CM

## 2021-10-31 PROCEDURE — 99442 PR PHYS/QHP TELEPHONE EVALUATION 11-20 MIN: CPT | Performed by: NURSE PRACTITIONER

## 2021-10-31 RX ORDER — METHYLPREDNISOLONE 4 MG/1
TABLET ORAL
Qty: 1 KIT | Refills: 0 | Status: CANCELLED | OUTPATIENT
Start: 2021-10-31

## 2021-10-31 RX ORDER — ALBUTEROL SULFATE 90 UG/1
2 AEROSOL, METERED RESPIRATORY (INHALATION) EVERY 6 HOURS PRN
Qty: 1 EACH | Refills: 0 | Status: SHIPPED | OUTPATIENT
Start: 2021-10-31 | End: 2021-11-17

## 2021-10-31 RX ORDER — PREDNISONE 10 MG/1
10 TABLET ORAL 2 TIMES DAILY
Qty: 10 TABLET | Refills: 0 | Status: SHIPPED | OUTPATIENT
Start: 2021-10-31 | End: 2021-11-05

## 2021-10-31 RX ORDER — BENZONATATE 100 MG/1
100 CAPSULE ORAL 3 TIMES DAILY PRN
Qty: 21 CAPSULE | Refills: 0 | Status: CANCELLED | OUTPATIENT
Start: 2021-10-31 | End: 2021-11-07

## 2021-10-31 RX ORDER — DOXYCYCLINE HYCLATE 100 MG
100 TABLET ORAL 2 TIMES DAILY
Qty: 14 TABLET | Refills: 0 | Status: SHIPPED | OUTPATIENT
Start: 2021-10-31 | End: 2021-11-07

## 2021-10-31 ASSESSMENT — ENCOUNTER SYMPTOMS
APNEA: 0
ABDOMINAL DISTENTION: 0
EYE ITCHING: 0
CHEST TIGHTNESS: 1
WHEEZING: 0
EYE REDNESS: 0
COUGH: 1
ABDOMINAL PAIN: 0
SHORTNESS OF BREATH: 1
RHINORRHEA: 1
NAUSEA: 0
TROUBLE SWALLOWING: 0
SWOLLEN GLANDS: 0
CONSTIPATION: 0
SINUS PAIN: 0
SORE THROAT: 0

## 2021-10-31 NOTE — PATIENT INSTRUCTIONS
Patient Education        Using a Dry Powder Inhaler: Care Instructions  Overview     A dry powder inhaler lets you breathe medicine into your lungs quickly. Inhaled medicine works faster than the same medicine in a pill. An inhaler lets you take less medicine than you would need if you took it as a pill. A dry powder inhaler delivers medicine in the form of a fine powder. Dry powder inhalers are activated by breathing. When you breathe in through the inhaler, the inhaler puts medicine into your lungs. Dry powder inhalers come in different shapes and sizes. For some, you need to add the medicine to the inhaler each time you use it. Some come with a supply of medicine already in them. For these, you'll need to [facility]" each dose of medicine each time you use it. How you load a dose depends on the type of inhaler you have. Follow-up care is a key part of your treatment and safety. Be sure to make and go to all appointments, and call your doctor if you are having problems. It's also a good idea to know your test results and keep a list of the medicines you take. How can you care for yourself at home? To get started  · Talk with your doctor, respiratory therapist, or pharmacist to be sure you are using your inhaler the right way. It might help if you practice using it in front of a mirror. Use the inhaler exactly as prescribed. · Check that you have the correct medicine. If you use several inhalers, put a label on each one so that you know which one to use at the right time. · Keep your inhaler in a cool, dry place. Do not store your inhaler in the bathroom. Moisture in the air can cause the dry powder to clump together. This can clog the inhaler. · Keep track of how much medicine is in the inhaler. Some dry powder inhalers have dose counters that show how many doses are left.  If your inhaler does not have a dose counter, your doctor or pharmacist can teach you how to keep track of how much medicine is left.  · If you are using a steroid medicine in the inhaler, gargle and rinse out your mouth with water after use. Do not swallow the water. Swallowing the water will increase the chance that the medicine will get into your bloodstream. This may make it more likely that you will have side effects. · Some powder may build up on the inhaler. You do not need to clean the inhaler every day. Follow your doctor's or pharmacist's instructions for cleaning your inhaler. To use a dry powder inhaler  · Remove the inhaler cap, if there is one. · Add or load a dose of medicine as directed by your health care provider. · Tilt your head back a little, and breathe out slowly and completely. Hold the inhaler away from your mouth while you breathe out. Do not breathe out into the inhaler. This can blow some of the powder medicine out of the inhaler. The moisture in your breath also can cause the dry powder to clump together and clog the inhaler. · Place the inhaler's mouthpiece in your mouth. Close your lips tightly around the mouthpiece. · Inhale quickly and deeply through your mouth for 2 or 3 seconds. This pulls the powder from the inhaler into your lungs. After you have inhaled the powder, take the inhaler out of your mouth. · Hold your breath for 10 seconds. This will let the medicine settle in your lungs. Then slowly breathe out through pursed lips. Make sure not to breathe out into the inhaler. · Repeat these steps if you need to take a second dose. Where can you learn more? Go to https://Zanbatosharan.icanbuy. org and sign in to your ItsOn account. Enter [phone] in the [facility] box to learn more about \"Using a Dry Powder Inhaler: Care Instructions. \"     If you do not have an account, please click on the \"Sign Up Now\" link. Current as of: July 6, 2021               Content Version: 13.0  © 5597-7470 Healthwise, Incorporated. Care instructions adapted under license by [address]  If you have be around people with colds or the flu, wash your hands often. · Talk to your doctor about getting a pneumococcal vaccine shot. If you have had one before, ask your doctor if you need a second dose. To help prevent problems before they occur, take your controller medicine every day, not only when you have symptoms. Where can you learn more? Go to https://Red ArilpepicewSpritz.Okeyko. org and sign in to your [employer] account. Enter P751 in the VideoNot.es box to learn more about \"Learning About Asthma Triggers. \"     If you do not have an account, please click on the \"Sign Up Now\" link. Current as of: July 6, 2021               Content Version: 13.0  © 2006-2021 [employer]. Care instructions adapted under license by [facility] ([facility]). If you have questions about a medical condition or this instruction, always ask your healthcare professional. [place] [address] any warranty or liability for your use of this information. Patient Education        Learning About Asthma  What is asthma? Asthma is a lifelong condition that can make it hard to breathe. It causes the airways that lead to the lungs to swell and get inflamed. Some people have a hard time breathing only at certain times. This may be during allergy season, when they get a cold, or when they exercise. Others have breathing problems a lot of the time. When asthma symptoms suddenly get worse (or flare up), the airways tighten and become narrower. This makes it hard to breathe, and you may wheeze or cough. These flare-ups are also called asthma attacks or exacerbations (say \"zf-ODB-ok-BAY-shuns\"). Even though asthma is a lifelong condition, treatment can help you feel and breathe better and help keep your lungs healthy. What are the symptoms of asthma? When you have asthma, you may:  · Wheeze, making a loud or soft whistling noise when you breathe in and out. · Cough a lot.  This is the only symptom for some people. · Feel tightness in your chest.  · Feel short of breath. You may have rapid, shallow breathing or trouble breathing. · Have trouble sleeping because you're coughing or having a hard time breathing. · Get tired quickly during exercise. Symptoms may start soon after you're around things (triggers) that cause your asthma attacks. This is an early phase response. Or they may start several hours after exposure (late phase response). A late phase response can make it harder to figure out what triggers your symptoms. Symptoms can be mild or severe. You may have symptoms daily or just now and then. Or you may have something in between. Some people have symptoms that get worse at night, such as a cough and shortness of breath. How is it treated? Asthma is treated with medicine to help you breathe easier, along with self-care. Medicines used to treat asthma include:  Controller medicines. These medicines prevent asthma attacks, stop problems before they happen, and reduce inflammation in your lungs. These things help you control your asthma. Quick-relief medicines. These medicines are used when you can't prevent symptoms and need to treat them fast.  Oral or injected corticosteroids (systemic corticosteroids). These medicines may be used to treat asthma attacks. Treatment also includes things you can do to control your symptoms, like avoiding your triggers and following your asthma action plan. How can you prevent an asthma attack? There's no certain way to prevent asthma. But you can reduce your risk of asthma attacks by avoiding things that cause them. And using your asthma controller medicine helps prevent them. The goal is to reduce how many attacks you have, how long they last, and how bad they get. Start by avoiding your asthma triggers. For example:  · Don't smoke. And avoid being around others when they smoke.   · Avoid things you're allergic to, like pet dander, dust mites, cockroaches, or pollen. · If exercise is a trigger, ask your doctor about using a quick-relief medicine before you exercise. · Stay inside when air pollution, pollen, or dust levels are high. · Try not to exercise outside when it's cold and dry. Also be sure to:  · Ask your doctor about getting the flu and pneumococcal vaccines. Illnesses, like colds, flu, or pneumonia can make symptoms worse. · Avoid taking aspirin, ibuprofen, or similar medicines if they make symptoms worse. Follow-up care is a key part of your treatment and safety. Be sure to make and go to all appointments, and call your doctor if you are having problems. It's also a good idea to know your test results and keep a list of the medicines you take. Where can you learn more? Go to https://Deep Glint.Spero Therapeutics. org and sign in to your [employer] account. Enter O533 in the [employer] box to learn more about \"Learning About Asthma. \"     If you do not have an account, please click on the \"Sign Up Now\" link. Current as of: July 6, 2021               Content Version: 13.0  © 1437-4771 [employer]. Care instructions adapted under license by [facility] ([facility]). If you have questions about a medical condition or this instruction, always ask your healthcare professional. [address] any warranty or liability for your use of this information. Patient Education        Bronchitis: Care Instructions  Your Care Instructions     Bronchitis is inflammation of the bronchial tubes, which carry air to the lungs. The tubes swell and produce mucus, or phlegm. The mucus and inflamed bronchial tubes make you cough. You may have trouble breathing. Most cases of bronchitis are caused by viruses like those that cause colds. Antibiotics usually do not help and they may be harmful. Bronchitis usually develops rapidly and lasts about 2 to 3 weeks in otherwise healthy people.   Follow-up care is a key part of your treatment and safety. Be sure to make and go to all appointments, and call your doctor if you are having problems. It's also a good idea to know your test results and keep a list of the medicines you take. How can you care for yourself at home? · Take all medicines exactly as prescribed. Call your doctor if you think you are having a problem with your medicine. · Get some extra rest.  · Take an over-the-counter pain medicine, such as acetaminophen (Tylenol), ibuprofen (Advil, Motrin), or naproxen (Aleve) to reduce fever and relieve body aches. Read and follow all instructions on the label. · Do not take two or more pain medicines at the same time unless the doctor told you to. Many pain medicines have acetaminophen, which is Tylenol. Too much acetaminophen (Tylenol) can be harmful. · Take an over-the-counter cough medicine to help quiet a dry, hacking cough so that you can sleep. Avoid cough medicines that have more than one active ingredient. Read and follow all instructions on the label. · Do not smoke. Smoking can make bronchitis worse. If you need help quitting, talk to your doctor about stop-smoking programs and medicines. These can increase your chances of quitting for good. When should you call for help? Call 911 anytime you think you may need emergency care. For example, call if:    · You have severe trouble breathing. Call your doctor now or seek immediate medical care if:    · You have new or worse trouble breathing.     · You cough up dark brown or bloody mucus (sputum).     · You have a new or higher fever.     · You have a new rash. Watch closely for changes in your health, and be sure to contact your doctor if:    · You cough more deeply or more often, especially if you notice more mucus or a change in the color of your mucus.     · You are not getting better as expected. Where can you learn more? Go to https://simi.LightPath Apps. org and sign in to your Kaliki account.  Enter H333 in the [facility] box to learn more about \"Bronchitis: Care Instructions. \"     If you do not have an account, please click on the \"Sign Up Now\" link. Current as of: July 6, 2021               Content Version: 13.0  © 4487-0490 Healthwise, Incorporated. Care instructions adapted under license by [facility] ([facility]). If you have questions about a medical condition or this instruction, always ask your healthcare professional. [address] any warranty or liability for your use of this information. Discussed signs and symptoms which require immediate follow-up in ED/call to 911. Patient verbalized understanding. Antibiotic Instructions: Complete the full course of antibiotics as ordered. Take each dose with a small snack or meal to lessen potential GI upset. To prevent antibiotic resistance, please take medication as ordered and for the full duration even if you start to feel better. Consider intake of yogurt or probiotic during antibiotic use and for a few days after to help reduce the risk of developing a secondary infection. Separate the yogurt and antibiotic by at least 1 hour. Avoid alcohol while taking antibiotics. PT WAS ADVISED TO FOLLOW UP WITH PCP AS PT MAY NEED TO HAVE FURTHER ASTHMA TESTING AND SEE A PULMONOLOGIST. PT AWARE.

## 2021-10-31 NOTE — PROGRESS NOTES
10/31/2021  Pt and provider completed telephone visit   TELEHEALTH EVALUATION -- Audio/Visual (During [place]- public health emergency)     Pt reports being tested for Covid/Flu and all 4 tests came back Neg. Pt reports a Hx of asthma but feels like it could be a flre up and bronchitis. Pt declines any acute distress at this time. Pt was advised today to follow up with his pcp and that he may need further testing and to see pulmonary for further testing. Pt aware. HPI:  URI   This is a recurrent problem. The current episode started 1 to 4 weeks ago (x 3 weeks). The problem has been unchanged. There has been no fever. Associated symptoms include congestion, coughing (pt reports prod cough at times) and rhinorrhea. Pertinent negatives include no abdominal pain, chest pain, dysuria, ear pain, headaches, nausea, rash, sinus pain, sore throat, swollen glands or wheezing. Treatments tried: previously on z aimee and steriods. The treatment provided mild relief. Pt reports using his nebulizer at home which has helped some. Pt declines tessalon pearls today. Dax Gonzales (:  1985) has requested an audio/video evaluation for the following concern(s):    Chief Complaint   Patient presents with    Congestion     chest congestion started 3 weeks ago     Shortness of Breath     pt reports the sob has been on and off for several weeks and using the nebulizer which has helped mildly    Cough     x 3 weeks on and off dry cough, pt has a HX of asthma          Review of Systems   Constitutional: Negative for activity change, appetite change, chills, fatigue and fever. HENT: Positive for congestion, postnasal drip and rhinorrhea. Negative for drooling, ear pain, hearing loss, sinus pain, sore throat and trouble swallowing. Eyes: Negative for redness, itching and visual disturbance.    Respiratory: Positive for cough (pt reports prod cough at times), chest tightness (pt reports his chest feels tight at times) and shortness of breath (pt reports he has some SOB with exertion or coughing fits, cold air). Negative for apnea and wheezing. Cardiovascular: Negative for chest pain. Gastrointestinal: Negative for abdominal distention, abdominal pain, constipation and nausea. Endocrine: Negative for heat intolerance. Genitourinary: Negative for difficulty urinating, dysuria, flank pain and genital sores. Musculoskeletal: Negative for gait problem, myalgias and neck stiffness. Skin: Negative for rash. Neurological: Negative for tremors, seizures, facial asymmetry and headaches. Hematological: Negative for adenopathy. Psychiatric/Behavioral: Negative for confusion. All other systems reviewed and are negative. Prior to Visit Medications    Medication Sig Taking? Authorizing Provider   doxycycline hyclate (VIBRA-TABS) 100 MG tablet Take 1 tablet by mouth 2 times daily for 7 days Yes ELPIDIO Saldivar CNP   albuterol sulfate  (90 Base) MCG/ACT inhaler Inhale 2 puffs into the lungs every 6 hours as needed for Wheezing Yes ELPIDIO Saldivar CNP   predniSONE (DELTASONE) 10 MG tablet Take 1 tablet by mouth 2 times daily for 5 days Yes ELPIDIO Saldivar CNP   ondansetron (ZOFRAN) 4 MG tablet Take 1 tablet by mouth 3 times daily as needed for Nausea or Vomiting Yes ELPIDIO Pizano NP   tadalafil (CIALIS) 5 MG tablet Take 1 tablet by mouth daily Yes Amador Jensen MD   testosterone cypionate (DEPOTESTOTERONE CYPIONATE) 200 MG/ML injection Inject 1 mL into the muscle every 14 days for 180 days. Yes Amador Jensen MD   lamoTRIgine (LAMICTAL XR) 100 MG TB24 extended release tablet TAKE 1 TABLET BY MOUTH EVERY DAY Yes Amador Jensen MD   omeprazole (PRILOSEC) 40 MG delayed release capsule TAKE 1 CAPSULE BY MOUTH EVERY DAY Yes Amador Jensen MD   clotrimazole-betamethasone (LOTRISONE) 1-0.05 % cream Apply topically 2 times daily.  Yes ELPIDIO Pizano NP metoprolol (LOPRESSOR) 100 MG tablet TAKE 1 TABLET BY MOUTH TWICE A DAY Yes Kristine Cassa MD   albuterol (PROVENTIL) (2.5 MG/3ML) 0.083% nebulizer solution Take 3 mLs by nebulization every 6 hours as needed for Wheezing Yes Kristine Casas MD   DULoxetine (CYMBALTA) 60 MG extended release capsule TAKE 1 CAPSULE BY MOUTH EVERY DAY Yes Kristine Casas MD   EPINEPHrine (EPIPEN 2-AUGUSTUS) 0.3 MG/0.3ML SOAJ injection Inject 0.3 mLs into the muscle once as needed (allergic reaction/bee sting) Use as directed for allergic reaction  Dispense 1 two pack (epi pen)  Corinne Fila Romito, DO       Social History     Tobacco Use    Smoking status: Never Smoker    Smokeless tobacco: Never Used   Substance Use Topics    Alcohol use: No     Alcohol/week: 0.0 standard drinks    Drug use: No        Allergies   Allergen Reactions    Amlodipine Swelling    Bee Venom     Compazine [Prochlorperazine Maleate] Swelling    Lisinopril Hives and Swelling     Skin tightens on his face and gets hives   ,   Past Medical History:   Diagnosis Date    Asthma     Dermatitis     Fatty liver     Gastroesophageal reflux disease with hiatal hernia     Hypertension     Obesity (BMI 30-39.9)    ,   Past Surgical History:   Procedure Laterality Date    LYMPH NODE BIOPSY      surgery due to cat scratch disease age 1   Eda Marcelina WISDOM TOOTH EXTRACTION     ,   Social History     Tobacco Use    Smoking status: Never Smoker    Smokeless tobacco: Never Used   Substance Use Topics    Alcohol use: No     Alcohol/week: 0.0 standard drinks    Drug use: No   , No family history on file.,   Immunization History   Administered Date(s) Administered    COVID-19, Valentino Flattery, Primary or Immunocompromised, PF, 100mcg/0.5mL 03/23/2021, 04/20/2021    Influenza Vaccine, unspecified formulation 12/23/2016    Influenza Virus Vaccine 01/01/2018    Influenza, Quadv, IM, (6 mo and older Fluzone, Flulaval, Fluarix and 3 yrs and older Afluria) 11/10/2020    Influenza, Quadv, IM, PF (6 mo and older Fluzone, Flulaval, Fluarix, and 3 yrs and older Afluria) 01/09/2020    Influenza, Quadv, Recombinant, IM PF (Flublok 18 yrs and older) 10/28/2020    Pneumococcal Polysaccharide ([id number]) 11/07/2008    Tdap (Boostrix, Adacel) 12/18/2013, 07/22/2014       PHYSICAL EXAMINATION:  [ INSTRUCTIONS:  \"[x]\" Indicates a positive item  \"[]\" Indicates a negative item  -- DELETE ALL ITEMS NOT EXAMINED]  Vital Signs: (As obtained by patient/caregiver or practitioner observation)    Blood pressure-  Heart rate-    Respiratory rate-    Temperature-  Pulse oximetry-     Pt was unable to provide any VS, declines fevers. Constitutional: [] Appears well-developed and well-nourished [] No apparent distress      [] Abnormal-   Mental status  [x] Alert and awake  [x] Oriented to person/place/time [x]Able to follow commands      Eyes:  EOM    []  Normal  [] Abnormal-  Sclera  []  Normal  [] Abnormal -         Discharge []  None visible  [] Abnormal -    HENT:   [] Normocephalic, atraumatic. [] Abnormal   [] Mouth/Throat: Mucous membranes are moist.     External Ears [] Normal  [] Abnormal-     Neck: [] No visualized mass     Pulmonary/Chest: [] Respiratory effort normal.  [] No visualized signs of difficulty breathing or respiratory distress        [] Abnormal-      Musculoskeletal:   [] Normal gait with no signs of ataxia         [] Normal range of motion of neck        [] Abnormal-       Neurological:        [] No Facial Asymmetry (Cranial nerve 7 motor function) (limited exam to video visit)          [] No gaze palsy        [] Abnormal-         Skin:        [] No significant exanthematous lesions or discoloration noted on facial skin         [] Abnormal-            Psychiatric:       [] Normal Affect [] No Hallucinations        [] Abnormal-     Pt declines any acute distress. ASSESSMENT/PLAN:  1. Bronchitis    - XR CHEST STANDARD (2 VW); Future  - doxycycline hyclate (VIBRA-TABS) 100 MG tablet;  Take 1 tablet by mouth 2 times daily for 7 days  Dispense: 14 tablet; Refill: 0  - albuterol sulfate  (90 Base) MCG/ACT inhaler; Inhale 2 puffs into the lungs every 6 hours as needed for Wheezing  Dispense: 1 each; Refill: 0  - predniSONE (DELTASONE) 10 MG tablet; Take 1 tablet by mouth 2 times daily for 5 days  Dispense: 10 tablet; Refill: 0    2. Mild asthma with exacerbation, unspecified whether persistent    - XR CHEST STANDARD (2 VW); Future  - albuterol sulfate  (90 Base) MCG/ACT inhaler; Inhale 2 puffs into the lungs every 6 hours as needed for Wheezing  Dispense: 1 each; Refill: 0  - predniSONE (DELTASONE) 10 MG tablet; Take 1 tablet by mouth 2 times daily for 5 days  Dispense: 10 tablet; Refill: 0    3. Dyspnea on exertion    - XR CHEST STANDARD (2 VW); Future  - albuterol sulfate  (90 Base) MCG/ACT inhaler; Inhale 2 puffs into the lungs every 6 hours as needed for Wheezing  Dispense: 1 each; Refill: 0  - predniSONE (DELTASONE) 10 MG tablet; Take 1 tablet by mouth 2 times daily for 5 days  Dispense: 10 tablet; Refill: 0    4. Cough    - albuterol sulfate  (90 Base) MCG/ACT inhaler; Inhale 2 puffs into the lungs every 6 hours as needed for Wheezing  Dispense: 1 each; Refill: 0      Return if symptoms worsen or fail to improve. Christiano Douglas is a 43 y.o. male being evaluated by a Virtual Visit (telephone   visit) encounter to address concerns as mentioned above. A caregiver was present when appropriate. Due to this being a TeleHealth encounter (During [facility]- public health emergency), evaluation of the following organ systems was limited: Vitals/Constitutional/EENT/Resp/CV/GI//MS/Neuro/Skin/Heme-Lymph-Imm.   Pursuant to the emergency declaration under the [facility], [facility]5 waiver authority and the [employer] Act, this Virtual Visit was conducted with patient's (and/or legal guardian's) consent, to reduce the patient's risk of exposure to COVID-19 and provide necessary medical care. The patient (and/or legal guardian) has also been advised to contact this office for worsening conditions or problems, and seek emergency medical treatment and/or call 911 if deemed necessary. Patient identification was verified at the start of the visit: Yes    Total time spent on this encounter: 6    Services were provided through a video synchronous discussion virtually to substitute for in-person clinic visit. Patient and provider were located at their individual homes. --ELPIDIO Hernandez CNP on 10/31/2021 at 10:54 AM    An electronic signature was used to authenticate this note.

## 2021-11-01 ENCOUNTER — TELEPHONE (OUTPATIENT)
Dept: PRIMARY CARE CLINIC | Age: 36
End: 2021-11-01

## 2021-11-01 ENCOUNTER — OFFICE VISIT (OUTPATIENT)
Dept: FAMILY MEDICINE CLINIC | Age: 36
End: 2021-11-01
Payer: COMMERCIAL

## 2021-11-01 VITALS
HEIGHT: 69 IN | HEART RATE: 89 BPM | WEIGHT: 242 LBS | TEMPERATURE: 97.6 F | BODY MASS INDEX: 35.84 KG/M2 | SYSTOLIC BLOOD PRESSURE: 118 MMHG | OXYGEN SATURATION: 98 % | DIASTOLIC BLOOD PRESSURE: 80 MMHG

## 2021-11-01 DIAGNOSIS — F32.A ANXIETY AND DEPRESSION: ICD-10-CM

## 2021-11-01 DIAGNOSIS — J45.901 MILD ASTHMA WITH EXACERBATION, UNSPECIFIED WHETHER PERSISTENT: ICD-10-CM

## 2021-11-01 DIAGNOSIS — F41.9 ANXIETY AND DEPRESSION: ICD-10-CM

## 2021-11-01 DIAGNOSIS — J40 BRONCHITIS: ICD-10-CM

## 2021-11-01 DIAGNOSIS — R06.09 DYSPNEA ON EXERTION: ICD-10-CM

## 2021-11-01 PROCEDURE — 99213 OFFICE O/P EST LOW 20 MIN: CPT | Performed by: FAMILY MEDICINE

## 2021-11-01 RX ORDER — HYDROXYZINE PAMOATE 25 MG/1
25 CAPSULE ORAL 3 TIMES DAILY PRN
Qty: 30 CAPSULE | Refills: 2 | Status: SHIPPED | OUTPATIENT
Start: 2021-11-01

## 2021-11-01 RX ORDER — LAMOTRIGINE 200 MG/1
200 TABLET, EXTENDED RELEASE ORAL DAILY
Qty: 30 TABLET | Refills: 5 | Status: SHIPPED | OUTPATIENT
Start: 2021-11-01 | End: 2021-11-26

## 2021-11-01 NOTE — PROGRESS NOTES
Out of Food in the Last Year: Never true    Ran Out of Food in the Last Year: Never true   Transportation Needs: No Transportation Needs    Lack of Transportation (Medical): No    Lack of Transportation (Non-Medical): No   Physical Activity:     Days of Exercise per Week:     Minutes of Exercise per Session:    Stress:     Feeling of Stress :    Social Connections:     Frequency of Communication with Friends and Family:     Frequency of Social Gatherings with Friends and Family:     Attends Holiness Services:     Active Member of Clubs or Organizations:     Attends Club or Organization Meetings:     Marital Status:    Intimate Partner Violence:     Fear of Current or Ex-Partner:     Emotionally Abused:     Physically Abused:     Sexually Abused:      Current Outpatient Medications   Medication Sig Dispense Refill    lamoTRIgine (LAMICTAL XR) 200 MG TB24 extended release tablet Take 1 tablet by mouth daily 30 tablet 5    doxycycline hyclate (VIBRA-TABS) 100 MG tablet Take 1 tablet by mouth 2 times daily for 7 days 14 tablet 0    albuterol sulfate  (90 Base) MCG/ACT inhaler Inhale 2 puffs into the lungs every 6 hours as needed for Wheezing 1 each 0    predniSONE (DELTASONE) 10 MG tablet Take 1 tablet by mouth 2 times daily for 5 days 10 tablet 0    tadalafil (CIALIS) 5 MG tablet Take 1 tablet by mouth daily 90 tablet 1    testosterone cypionate (DEPOTESTOTERONE CYPIONATE) 200 MG/ML injection Inject 1 mL into the muscle every 14 days for 180 days. 6 mL 1    omeprazole (PRILOSEC) 40 MG delayed release capsule TAKE 1 CAPSULE BY MOUTH EVERY DAY 90 capsule 2    clotrimazole-betamethasone (LOTRISONE) 1-0.05 % cream Apply topically 2 times daily.  45 g 5    metoprolol (LOPRESSOR) 100 MG tablet TAKE 1 TABLET BY MOUTH TWICE A  tablet 1    albuterol (PROVENTIL) (2.5 MG/3ML) 0.083% nebulizer solution Take 3 mLs by nebulization every 6 hours as needed for Wheezing 120 each 3    ondansetron (ZOFRAN) 4 MG tablet Take 1 tablet by mouth 3 times daily as needed for Nausea or Vomiting 15 tablet 0    EPINEPHrine (EPIPEN 2-AUGUSTUS) 0.3 MG/0.3ML SOAJ injection Inject 0.3 mLs into the muscle once as needed (allergic reaction/bee sting) Use as directed for allergic reaction  Dispense 1 two pack (epi pen) 1 each 0     No current facility-administered medications for this visit. Allergies   Allergen Reactions    Amlodipine Swelling    Bee Venom     Compazine [Prochlorperazine Maleate] Swelling    Lisinopril Hives and Swelling     Skin tightens on his face and gets hives       Review of Systems:   General ROS: no f/c/n/v  Respiratory ROS: no cough, shortness of breath, or wheezing  Cardiovascular ROS: no chest pain or dyspnea on exertion  Gastrointestinal ROS: no abdominal pain, change in bowel habits, or black or bloody stools  Genito-Urinary ROS: per HPI  Musculoskeletal ROS: negative for - gait disturbance, joint pain or joint stiffness  Neurological ROS: negative for - behavioral changes, memory loss, numbness/tingling, tremors or weakness    In general patient otherwise reports feeling well. Physical Exam:  /80   Pulse 89   Temp 97.6 °F (36.4 °C)   Ht 5' 9\" (1.753 m)   Wt 242 lb (109.8 kg)   SpO2 98%   BMI 35.74 kg/m²     Gen: Well, NAD, Alert, Oriented x 3   HEENT: EOMI, eyes clear, MMM  Skin: no rash or jaundice  Lungs: CTA B w/out Rales/Wheezes/Rhonchi, Good respiratory effort   Heart: RRR, S1S2, w/out M/R/G, non-displaced PMI   Psych: generally euthymic here today with intact insight and judgement, no SI/HI  Neuro: Neurovascularly intact w/ Sensory/Motor intact UE/LE Bilaterally.     Lab Results   Component Value Date    WBC 9.9 08/13/2021    HGB 17.7 08/13/2021    HCT 51.8 08/13/2021     08/13/2021    CHOL 213 (H) 10/06/2020    TRIG 139 10/06/2020    HDL 54 10/06/2020    ALT 58 (H) 10/06/2020    AST 27 10/06/2020     10/06/2020    K 5.0 (H) 10/06/2020     10/06/2020 CREATININE 0.74 10/06/2020    BUN 14 10/06/2020    CO2 23 10/06/2020    TSH 2.140 10/06/2020    PSA 0.54 08/07/2019    LABA1C 5.6 12/23/2020         A&P   Diagnosis Orders   1. Dyspnea on exertion  XR CHEST STANDARD (2 VW)   2. Bronchitis  XR CHEST STANDARD (2 VW)   3. Mild asthma with exacerbation, unspecified whether persistent  XR CHEST STANDARD (2 VW)   4.  Anxiety and depression  lamoTRIgine (LAMICTAL XR) 200 MG TB24 extended release tablet     lamictal to 200mg     CXR    Doxy and prednisone                 Javier Cuadra MD

## 2021-11-01 NOTE — TELEPHONE ENCOUNTER
Called and advised pt that his CXR came back WNL. Pt reports seeing his PCP toay and per PCP he reports to just keep taking the meds I prescribed yesterday for him.

## 2021-11-17 DIAGNOSIS — R06.09 DYSPNEA ON EXERTION: ICD-10-CM

## 2021-11-17 DIAGNOSIS — R05.9 COUGH: ICD-10-CM

## 2021-11-17 DIAGNOSIS — J40 BRONCHITIS: ICD-10-CM

## 2021-11-17 DIAGNOSIS — J45.901 MILD ASTHMA WITH EXACERBATION, UNSPECIFIED WHETHER PERSISTENT: ICD-10-CM

## 2021-11-17 RX ORDER — ALBUTEROL SULFATE 90 UG/1
AEROSOL, METERED RESPIRATORY (INHALATION)
Qty: 6.7 EACH | Refills: 5 | Status: SHIPPED | OUTPATIENT
Start: 2021-11-17

## 2021-11-24 DIAGNOSIS — F32.A ANXIETY AND DEPRESSION: ICD-10-CM

## 2021-11-24 DIAGNOSIS — F41.9 ANXIETY AND DEPRESSION: ICD-10-CM

## 2021-11-26 RX ORDER — LAMOTRIGINE 200 MG/1
TABLET, EXTENDED RELEASE ORAL
Qty: 30 TABLET | Refills: 5 | Status: SHIPPED | OUTPATIENT
Start: 2021-11-26 | End: 2022-06-07

## 2021-11-26 NOTE — TELEPHONE ENCOUNTER
Requesting medication refill. Please approve or deny this request.    Rx requested:  Requested Prescriptions     Pending Prescriptions Disp Refills    lamoTRIgine (LAMICTAL XR) 200 MG TB24 extended release tablet [Pharmacy Med Name: LAMOTRIGINE  MG TABLET] 30 tablet 5     Sig: TAKE 1 TABLET BY MOUTH EVERY DAY       Last Office Visit:   11/1/2021    Next Visit Date:  No future appointments.

## 2021-12-13 ENCOUNTER — VIRTUAL VISIT (OUTPATIENT)
Dept: FAMILY MEDICINE CLINIC | Age: 36
End: 2021-12-13
Payer: COMMERCIAL

## 2021-12-13 ENCOUNTER — TELEPHONE (OUTPATIENT)
Dept: FAMILY MEDICINE CLINIC | Age: 36
End: 2021-12-13

## 2021-12-13 DIAGNOSIS — J10.1 INFLUENZA A: Primary | ICD-10-CM

## 2021-12-13 DIAGNOSIS — J45.40 MODERATE PERSISTENT ASTHMA WITHOUT COMPLICATION: ICD-10-CM

## 2021-12-13 DIAGNOSIS — R05.9 COUGH: ICD-10-CM

## 2021-12-13 LAB
INFLUENZA A ANTIBODY: ABNORMAL
INFLUENZA B ANTIBODY: ABNORMAL
Lab: NORMAL
PERFORMING INSTRUMENT: NORMAL
QC PASS/FAIL: NORMAL
SARS-COV-2, POC: NORMAL

## 2021-12-13 PROCEDURE — 99213 OFFICE O/P EST LOW 20 MIN: CPT | Performed by: NURSE PRACTITIONER

## 2021-12-13 PROCEDURE — 87426 SARSCOV CORONAVIRUS AG IA: CPT | Performed by: NURSE PRACTITIONER

## 2021-12-13 PROCEDURE — 87804 INFLUENZA ASSAY W/OPTIC: CPT | Performed by: NURSE PRACTITIONER

## 2021-12-13 RX ORDER — PREDNISONE 20 MG/1
40 TABLET ORAL DAILY
Qty: 10 TABLET | Refills: 0 | Status: SHIPPED | OUTPATIENT
Start: 2021-12-13 | End: 2021-12-18

## 2021-12-13 RX ORDER — OSELTAMIVIR PHOSPHATE 75 MG/1
75 CAPSULE ORAL 2 TIMES DAILY
Qty: 10 CAPSULE | Refills: 0 | Status: SHIPPED | OUTPATIENT
Start: 2021-12-13 | End: 2021-12-18

## 2021-12-13 ASSESSMENT — ENCOUNTER SYMPTOMS
ABDOMINAL PAIN: 0
RHINORRHEA: 0
DIARRHEA: 1
NAUSEA: 0
COUGH: 1
CHEST TIGHTNESS: 1
SHORTNESS OF BREATH: 0
SORE THROAT: 0
WHEEZING: 1

## 2021-12-13 NOTE — PROGRESS NOTES
TELEHEALTH EVALUATION -- Audio/Visual (During ZGTTJ-69 public health emergency)    -   Jami Gaytan is a 43 y.o. male being evaluated by a Virtual Visit (video visit) encounter to address concerns as mentioned above. A caregiver was present when appropriate. Due to this being a TeleHealth encounter (During ZPBMI-83 public health emergency), evaluation of the following organ systems was limited: Vitals/Constitutional/EENT/Resp/CV/GI//MS/Neuro/Skin/Heme-Lymph-Imm. Pursuant to the emergency declaration under the [address] [place] [address], [address] [place] [address] authority and the [employer] Act, this Virtual Visit was conducted with patient's (and/or legal guardian's) consent, to reduce the patient's risk of exposure to COVID-19 and provide necessary medical care. The patient (and/or legal guardian) has also been advised to contact this office for worsening conditions or problems, and seek emergency medical treatment and/or call 911 if deemed necessary. Patient was contacted and agreed to proceed with a virtual visit via Telephone Visit  The risks and benefits of converting to a virtual visit were discussed in light of the current infectious disease epidemic. Patient also understood that insurance coverage and co-pays are up to their individual insurance plans. Patient was located at their home. Provider was located at their office. 2021  Jami Gaytan (:  1985) has requested an audio/video evaluation for the following concern(s):    HPI  VV audio for viral testing   Received first two doses of Moderna vaccines   Symptoms began with h/a, diarrhea and nasal congestion  Started to feel worse on Thursday with cough and chest tightness  Cough is dry. Persistent. Coughing fits   Denies hemoptysis   Wheezing at night.  Awakens with him while resting   Symptoms at rest, but exacerbate with exertion  Reports has felt SOB often. Asthma. Not on maintenance medication. Diarrhea has resolved   Fatigued   Not monitoring temp  Eating and drinking well   Albuterol and nebulizer   His kids were ill prior to him exhibiting symptoms                 Review of Systems   Constitutional: Positive for fatigue. Negative for activity change, appetite change, chills and diaphoresis. Fever: unsure. not taking temp. HENT: Positive for congestion. Negative for ear pain, rhinorrhea and sore throat. Respiratory: Positive for cough, chest tightness and wheezing. Negative for shortness of breath. Cardiovascular: Negative for chest pain and palpitations. Gastrointestinal: Positive for diarrhea (Fri/Sat ). Negative for abdominal pain and nausea. Musculoskeletal: Negative for myalgias. Skin: Negative for rash. Neurological: Positive for headaches. Negative for dizziness and light-headedness. Psychiatric/Behavioral: Positive for sleep disturbance. Prior to Visit Medications    Medication Sig Taking? Authorizing Provider   fluticasone-salmeterol (ADVAIR DISKUS) 100-50 MCG/DOSE diskus inhaler Inhale 1 puff into the lungs every 12 hours Yes ELPIDIO Beltran NP   lamoTRIgine (LAMICTAL XR) 200 MG TB24 extended release tablet TAKE 1 TABLET BY MOUTH EVERY DAY  Tabyb Yates MD   albuterol sulfate  (90 Base) MCG/ACT inhaler TAKE 2 PUFFS BY MOUTH EVERY 6 HOURS AS NEEDED FOR WHEEZE  Tabby Yates MD   hydrOXYzine (VISTARIL) 25 MG capsule Take 1 capsule by mouth 3 times daily as needed for Anxiety  Tabby Yates MD   tadalafil (CIALIS) 5 MG tablet Take 1 tablet by mouth daily  Tabby Yates MD   testosterone cypionate (DEPOTESTOTERONE CYPIONATE) 200 MG/ML injection Inject 1 mL into the muscle every 14 days for 180 days.   Tabby Yates MD   omeprazole (PRILOSEC) 40 MG delayed release capsule TAKE 1 CAPSULE BY MOUTH EVERY DAY  Tabby Yates MD   clotrimazole-betamethasone (LOTRISONE) 1-0.05 % cream Apply topically 2 times daily.  ELPIDIO Retana NP   metoprolol (LOPRESSOR) 100 MG tablet TAKE 1 TABLET BY MOUTH TWICE A DAY  Shad Joyner MD   albuterol (PROVENTIL) (2.5 MG/3ML) 0.083% nebulizer solution Take 3 mLs by nebulization every 6 hours as needed for Wheezing  Shad Joyner MD   EPINEPHrine (EPIPEN 2-AUGUSTUS) 0.3 MG/0.3ML SOAJ injection Inject 0.3 mLs into the muscle once as needed (allergic reaction/bee sting) Use as directed for allergic reaction  Dispense 1 two pack (epi pen)  Koko Kerns DO       Past Medical History:   Diagnosis Date    Asthma     Dermatitis     Fatty liver     Gastroesophageal reflux disease with hiatal hernia     Hypertension     Obesity (BMI 30-39. 9)      Past Surgical History:   Procedure Laterality Date    LYMPH NODE BIOPSY      surgery due to cat scratch disease age 1   [facility] WISDOM TOOTH EXTRACTION       Social History     Socioeconomic History    Marital status:      Spouse name: Not on file    Number of children: 1    Years of education: Not on file    Highest education level: Not on file   Occupational History    Not on file   Tobacco Use    Smoking status: Never Smoker    Smokeless tobacco: Never Used   Substance and Sexual Activity    Alcohol use: No     Alcohol/week: 0.0 standard drinks    Drug use: No    Sexual activity: Not on file   Other Topics Concern    Not on file   Social History Narrative    Not on file     Social Determinants of Health     Financial Resource Strain: Low Risk     Difficulty of Paying Living Expenses: Not hard at all   Food Insecurity: No Food Insecurity    Worried About [address] [employer] in the Last Year: Never true    [facility] in the Last Year: Never true   Transportation Needs: No Transportation Needs    Lack of Transportation (Medical): No    Lack of Transportation (Non-Medical):  No   Physical Activity:     Days of Exercise per Week: Not on file    Minutes of Exercise per Session: Not on file   Stress:     Feeling of Stress : Not on file   Social Connections:     Frequency of Communication with Friends and Family: Not on file    Frequency of Social Gatherings with Friends and Family: Not on file    Attends Bahai Services: Not on file    Active Member of Clubs or Organizations: Not on file    Attends Club or Organization Meetings: Not on file    Marital Status: Not on file   Intimate Partner Violence:     Fear of Current or Ex-Partner: Not on file    Emotionally Abused: Not on file    Physically Abused: Not on file    Sexually Abused: Not on file   Housing Stability:     Unable to Pay for Housing in the Last Year: Not on file    Number of [place] in the Last Year: Not on file    Unstable Housing in the Last Year: Not on file     No family history on file. Allergies   Allergen Reactions    Amlodipine Swelling    Bee Venom     Compazine [Prochlorperazine Maleate] Swelling    Lisinopril Hives and Swelling     Skin tightens on his face and gets hives         PMH, Surgical Hx, Family Hx, and Social Hx reviewed and updated. PHYSICAL EXAMINATION: N/A. VV Audio    Oriented and conversant   No audible distress   Cough throughout visit                      Other pertinent observable physical exam findings-   Results for orders placed or performed in visit on 12/13/21   POCT COVID-19, Antigen   Result Value Ref Range    SARS-COV-2, POC Not-Detected Not Detected    Lot Number [id number]     QC Pass/Fail Pass     Performing Instrument BD Veritor    POCT Influenza A/B   Result Value Ref Range    Influenza A Ab POS     Influenza B Ab NEG        ASSESSMENT/PLAN:  Assessment & Plan   Sheri Moreno was seen today for cough. Diagnoses and all orders for this visit:    Influenza A  -     oseltamivir (TAMIFLU) 75 MG capsule;  Take 1 capsule by mouth 2 times daily for 5 days    Moderate persistent asthma without complication  -     POCT COVID-19, Antigen  -     POCT Influenza A/B  -     predniSONE (DELTASONE) 20 MG tablet; Take 2 tablets by mouth daily for 5 days  -     fluticasone-salmeterol (ADVAIR DISKUS) 100-50 MCG/DOSE diskus inhaler; Inhale 1 puff into the lungs every 12 hours    Cough  -     POCT COVID-19, Antigen  -     POCT Influenza A/B  -     predniSONE (DELTASONE) 20 MG tablet; Take 2 tablets by mouth daily for 5 days  -     fluticasone-salmeterol (ADVAIR DISKUS) 100-50 MCG/DOSE diskus inhaler; Inhale 1 puff into the lungs every 12 hours      Orders Placed This Encounter   Procedures    POCT COVID-19, Antigen     Order Specific Question:   Is this test for diagnosis or screening? Answer:   Diagnosis of ill patient     Order Specific Question:   Symptomatic for COVID-19 as defined by CDC? Answer:   Yes     Order Specific Question:   Date of Symptom Onset     Answer:   12/10/2021     Order Specific Question:   Hospitalized for COVID-19? Answer:   No     Order Specific Question:   Admitted to ICU for COVID-19? Answer:   No     Order Specific Question:   Employed in healthcare setting? Answer:   No     Order Specific Question:   Resident in a congregate (group) care setting? Answer:   No     Order Specific Question:   Pregnant: Answer:   No     Order Specific Question:   Previously tested for COVID-19? Answer: Yes    POCT Influenza A/B     Orders Placed This Encounter   Medications    oseltamivir (TAMIFLU) 75 MG capsule     Sig: Take 1 capsule by mouth 2 times daily for 5 days     Dispense:  10 capsule     Refill:  0    predniSONE (DELTASONE) 20 MG tablet     Sig: Take 2 tablets by mouth daily for 5 days     Dispense:  10 tablet     Refill:  0    fluticasone-salmeterol (ADVAIR DISKUS) 100-50 MCG/DOSE diskus inhaler     Sig: Inhale 1 puff into the lungs every 12 hours     Dispense:  60 each     Refill:  1     There are no discontinued medications. Return if symptoms worsen or fail to improve, for follow up with PCP.    If you worsen or experience any of the red flag s/s discussed, seek care at ER      Reviewed with the patient: current clinical status & medications. Side effects, adverse effects of the medications prescribed today, as well as treatment plan/rationale and result expectations have been discussed with the patient who expressed understanding. Patient aware to rinse mouth and gargle water with each usage of ICS. Advised pt to schedule COVID-19 booster with resolve of current illness. Treatment for influenza includes supportive care with rest, hydration, and medications for symptom management as discussed/ordered. A prescription for Tamiflu has been sent to the pharmacy. The purpose of Tamiflu is to attempt to shorten the duration and severity of illness. This is a contagious illness which is transmitted through the air. Make sure to cover your cough and practice good hand hygiene. Stay at home until fever has resolved. Return if symptoms worsen or persist for more than a week for a visit with your PCP. Oral Steroid Instructions: Take each dose with a small snack or meal to lessen potential GI upset. Follow dosing instructions provided with prescription. Common side effects include difficulty sleeping and irritability. Take full course as ordered. When to call for help  Call 911 anytime you think you may need emergency care. For example, call if:  · You have severe trouble breathing. · You have severe dehydration. Close follow up to evaluate treatment results and for coordination of care. I have reviewed the patient's medical history in detail and updated the computerized patient record. Patient identification was verified at the start of the visit: Yes  Total time spent on this encounter: 20 minutes  >50% of 20 minutes was spent spent on counseling, answering questions, instructions on meds & testing & coordinating the care based on my plan and assessment as noted.             --Clara Vasquez, ELPIDIO - NP on 12/19/2021 at 10:49 PM    An electronic signature was used to authenticate this note.

## 2021-12-23 ENCOUNTER — TELEPHONE (OUTPATIENT)
Dept: FAMILY MEDICINE CLINIC | Age: 36
End: 2021-12-23

## 2021-12-23 DIAGNOSIS — Z30.09 VASECTOMY EVALUATION: Primary | ICD-10-CM

## 2021-12-27 DIAGNOSIS — R05.9 COUGH: ICD-10-CM

## 2021-12-27 DIAGNOSIS — J20.9 ACUTE BRONCHITIS, UNSPECIFIED ORGANISM: ICD-10-CM

## 2021-12-27 DIAGNOSIS — J45.40 MODERATE PERSISTENT ASTHMA WITHOUT COMPLICATION: ICD-10-CM

## 2021-12-28 RX ORDER — ALBUTEROL SULFATE 2.5 MG/3ML
2.5 SOLUTION RESPIRATORY (INHALATION) EVERY 6 HOURS PRN
Qty: 120 EACH | Refills: 3 | Status: SHIPPED | OUTPATIENT
Start: 2021-12-28

## 2021-12-28 RX ORDER — CLOTRIMAZOLE AND BETAMETHASONE DIPROPIONATE 10; .64 MG/G; MG/G
CREAM TOPICAL
Qty: 45 G | Refills: 5 | Status: SHIPPED | OUTPATIENT
Start: 2021-12-28 | End: 2022-01-17

## 2021-12-28 NOTE — TELEPHONE ENCOUNTER
Preferred pharmacy: [facility], [place] - F [phone]                     Future Appointments    This patient does not currently have any appointments scheduled. Recent Visits    12/13/2021 Influenza [address] [facility] ELPIDIO Loyola NP   11/01/2021 Dyspnea on exertion   Rome Singh MD   10/31/2021 [address] [facility] ELPIDIO Echevarria CNP   10/19/2021 Lower respiratory infection (e.g., bronchitis, pneumonia, pneumonitis, pulmonitis)   [facility] Ronit Cochran, APRN - CNP   10/18/2021 Viral illness   [id number], APRN - NP

## 2022-01-12 ENCOUNTER — NURSE ONLY (OUTPATIENT)
Dept: FAMILY MEDICINE CLINIC | Age: 37
End: 2022-01-12

## 2022-01-12 ENCOUNTER — VIRTUAL VISIT (OUTPATIENT)
Dept: FAMILY MEDICINE CLINIC | Age: 37
End: 2022-01-12
Payer: COMMERCIAL

## 2022-01-12 DIAGNOSIS — B34.9 VIRAL ILLNESS: Primary | ICD-10-CM

## 2022-01-12 PROCEDURE — 99442 PR PHYS/QHP TELEPHONE EVALUATION 11-20 MIN: CPT

## 2022-01-12 PROCEDURE — 87426 SARSCOV CORONAVIRUS AG IA: CPT

## 2022-01-12 PROCEDURE — 87804 INFLUENZA ASSAY W/OPTIC: CPT

## 2022-01-12 ASSESSMENT — PATIENT HEALTH QUESTIONNAIRE - PHQ9
SUM OF ALL RESPONSES TO PHQ QUESTIONS 1-9: 0
3. TROUBLE FALLING OR STAYING ASLEEP: 0
8. MOVING OR SPEAKING SO SLOWLY THAT OTHER PEOPLE COULD HAVE NOTICED. OR THE OPPOSITE, BEING SO FIGETY OR RESTLESS THAT YOU HAVE BEEN MOVING AROUND A LOT MORE THAN USUAL: 0
7. TROUBLE CONCENTRATING ON THINGS, SUCH AS READING THE NEWSPAPER OR WATCHING TELEVISION: 0
4. FEELING TIRED OR HAVING LITTLE ENERGY: 0
5. POOR APPETITE OR OVEREATING: 0
9. THOUGHTS THAT YOU WOULD BE BETTER OFF DEAD, OR OF HURTING YOURSELF: 0
SUM OF ALL RESPONSES TO PHQ QUESTIONS 1-9: 0
1. LITTLE INTEREST OR PLEASURE IN DOING THINGS: 0
2. FEELING DOWN, DEPRESSED OR HOPELESS: 0
SUM OF ALL RESPONSES TO PHQ QUESTIONS 1-9: 0
SUM OF ALL RESPONSES TO PHQ9 QUESTIONS 1 & 2: 0
10. IF YOU CHECKED OFF ANY PROBLEMS, HOW DIFFICULT HAVE THESE PROBLEMS MADE IT FOR YOU TO DO YOUR WORK, TAKE CARE OF THINGS AT HOME, OR GET ALONG WITH OTHER PEOPLE: 0
6. FEELING BAD ABOUT YOURSELF - OR THAT YOU ARE A FAILURE OR HAVE LET YOURSELF OR YOUR FAMILY DOWN: 0
SUM OF ALL RESPONSES TO PHQ QUESTIONS 1-9: 0

## 2022-01-12 ASSESSMENT — ENCOUNTER SYMPTOMS
EYE ITCHING: 0
DIARRHEA: 0
CHEST TIGHTNESS: 0
ABDOMINAL PAIN: 0
NAUSEA: 0
COUGH: 0
SINUS PAIN: 0
SHORTNESS OF BREATH: 0
SINUS PRESSURE: 0
EYE PAIN: 0
EYE DISCHARGE: 0
SORE THROAT: 1
VOMITING: 0
RHINORRHEA: 0
COLOR CHANGE: 0
BACK PAIN: 0
TROUBLE SWALLOWING: 0
APNEA: 0
WHEEZING: 0
FACIAL SWELLING: 0

## 2022-01-12 NOTE — PATIENT INSTRUCTIONS
Patient Education        Viral Infections: Care Instructions  Overview     You don't feel well, but it's not clear what's causing it. You may have a viral infection. Viruses cause many illnesses, such as the common cold, influenza, fever, rashes, and the diarrhea, nausea, and vomiting that are symptoms of a stomach infection. You may wonder if antibiotic medicines could make you feel better. But antibiotics only treat infections caused by bacteria. They don't work on viruses. The good news is that viral infections usually aren't serious. Most will go away in a few days without medical treatment. In the meantime, there are a few things you can do to make yourself more comfortable. Follow-up care is a key part of your treatment and safety. Be sure to make and go to all appointments, and call your doctor if you are having problems. It's also a good idea to know your test results and keep a list of the medicines you take. How can you care for yourself at home? · Get plenty of rest if you feel tired. · Take an over-the-counter pain medicine if needed, such as acetaminophen (Tylenol), ibuprofen (Advil, Motrin), or naproxen (Aleve). Read and follow all instructions on the label. · Be careful when taking over-the-counter cold or flu medicines and Tylenol at the same time. Many of these medicines have acetaminophen, which is Tylenol. Read the labels to make sure that you are not taking more than the recommended dose. Too much acetaminophen (Tylenol) can be harmful. · Drink plenty of fluids. If you have kidney, heart, or liver disease and have to limit fluids, talk with your doctor before you increase the amount of fluids you drink. · Stay home from work, school, and other public places while you have a fever. When should you call for help? Call 911 anytime you think you may need emergency care. For example, call if:    · You have severe trouble breathing.     · You passed out (lost consciousness).    Call your doctor now or seek immediate medical care if:    · You seem to be getting much sicker.     · You have a new or higher fever.     · You have blood in your stools.     · You have new belly pain, or your pain gets worse.     · You have a new rash. Watch closely for changes in your health, and be sure to contact your doctor if:    · You start to get better and then get worse.     · You do not get better as expected. Where can you learn more? Go to https://Speedshape.[employer]. org and sign in to your [facility] account. Enter Y225 in the [place] box to learn more about \"Viral Infections: Care Instructions. \"     If you do not have an account, please click on the \"Sign Up Now\" link. Current as of: July 1, 2021               Content Version: 13.1  © 4671-3347 Healthwise, Incorporated. Care instructions adapted under license by [facility] ([facility]). If you have questions about a medical condition or this instruction, always ask your healthcare professional. [address] any warranty or liability for your use of this information. Expect a 7 to 14-day course of the illness before symptoms resolve. Increase water intake and watch for signs of dehydration such as feeling light headed, reduced urine output fatigue or shortness of breath when walking. Go to the ER if you experience these symptoms or fevers that cannot be controlled with Tylenol or ibuprofen. Use Sudafed (pseudoephedrine) or Afrin 12-hour nasal spray (oxymetazoline ) for symptom of sinus congestion. Tussin cough syrup (Dextromethorphan) to treat symptom of irritating cough. Mucinex (guaifenesin)  is indicated if you have chest mucus that you are having difficulty coughing up.

## 2022-01-12 NOTE — PROGRESS NOTES
2022    TELEHEALTH EVALUATION -- Audio/Visual (During HLRLK-20 public health emergency)    Due to COVID 19 outbreak, patient's office visit was converted to a virtual visit. Patient was contacted and agreed to proceed with a virtual visit via Telephone Visit  The risks and benefits of converting to a virtual visit were discussed in light of the current infectious disease epidemic. Patient also understood that insurance coverage and co-pays are up to their individual insurance plans. HPI:    Efe Ureña (:  1985) has requested an audio/video evaluation for the following concern(s):  Chief Complaint   Patient presents with    Concern For COVID-19     mild flu symtoms, slight temp, chills, naseated, sx started     Other     just returned from [address] [place] [address] him and friend both now ill        Starting  symptoms of chills nausea and headaches. Patient reports nausea is mild he is able to drink fluids. Denies cough. Reports he had mild sore throat in the middle of the night but this has resolved at this time    Patient Active Problem List   Diagnosis    Hypertension    Asthma    Gastroesophageal reflux disease with hiatal hernia    Dermatitis         Review of Systems   Constitutional: Positive for chills and fatigue. Negative for appetite change, diaphoresis and fever. HENT: Positive for sore throat. Negative for congestion, ear discharge, ear pain, facial swelling, hearing loss, mouth sores, postnasal drip, rhinorrhea, sinus pressure, sinus pain and trouble swallowing. Eyes: Negative for pain, discharge and itching. Respiratory: Negative for apnea, cough, chest tightness, shortness of breath and wheezing. Cardiovascular: Negative for chest pain and palpitations. Gastrointestinal: Negative for abdominal pain, diarrhea, nausea and vomiting. Endocrine: Negative for cold intolerance and heat intolerance. Musculoskeletal: Negative for arthralgias, back pain and myalgias. Skin: Negative for color change, pallor and rash. Neurological: Positive for headaches. Negative for dizziness, syncope, weakness and light-headedness. Hematological: Negative for adenopathy. Psychiatric/Behavioral: Negative for behavioral problems, confusion and sleep disturbance. PAST MEDICAL HISTORY         Diagnosis Date    Asthma     Dermatitis     Fatty liver     Gastroesophageal reflux disease with hiatal hernia     Hypertension     Obesity (BMI 30-39. 9)      SURGICAL HISTORY     Patient  has a past surgical history that includes Peck tooth extraction and lymph node biopsy. CURRENT MEDICATIONS       Previous Medications    ALBUTEROL (PROVENTIL) (2.5 MG/3ML) 0.083% NEBULIZER SOLUTION    Take 3 mLs by nebulization every 6 hours as needed for Wheezing    ALBUTEROL SULFATE  (90 BASE) MCG/ACT INHALER    TAKE 2 PUFFS BY MOUTH EVERY 6 HOURS AS NEEDED FOR WHEEZE    CLOTRIMAZOLE-BETAMETHASONE (LOTRISONE) 1-0.05 % CREAM    Apply topically 2 times daily. EPINEPHRINE (EPIPEN 2-AUGUSTUS) 0.3 MG/0.3ML SOAJ INJECTION    Inject 0.3 mLs into the muscle once as needed (allergic reaction/bee sting) Use as directed for allergic reaction  Dispense 1 two pack (epi pen)    FLUTICASONE-SALMETEROL (ADVAIR DISKUS) 100-50 MCG/DOSE DISKUS INHALER    Inhale 1 puff into the lungs every 12 hours    HYDROXYZINE (VISTARIL) 25 MG CAPSULE    Take 1 capsule by mouth 3 times daily as needed for Anxiety    LAMOTRIGINE (LAMICTAL XR) 200 MG TB24 EXTENDED RELEASE TABLET    TAKE 1 TABLET BY MOUTH EVERY DAY    METOPROLOL (LOPRESSOR) 100 MG TABLET    TAKE 1 TABLET BY MOUTH TWICE A DAY    OMEPRAZOLE (PRILOSEC) 40 MG DELAYED RELEASE CAPSULE    TAKE 1 CAPSULE BY MOUTH EVERY DAY    TADALAFIL (CIALIS) 5 MG TABLET    Take 1 tablet by mouth daily    TESTOSTERONE CYPIONATE (DEPOTESTOTERONE CYPIONATE) 200 MG/ML INJECTION    Inject 1 mL into the muscle every 14 days for 180 days.      ALLERGIES     Patient is is allergic to amlodipine, bee venom, compazine [prochlorperazine maleate], and lisinopril. FAMILY HISTORY     Patient'sfamily history is not on file. HISTORY     Patient  reports that he has never smoked. He has never used smokeless tobacco. He reports that he does not drink alcohol and does not use drugs. PHYSICAL EXAMINATION:  Patient-Reported Vitals 10/31/2021   Patient-Reported Weight 230 lb   Patient-Reported Height 5 9         No exam  Phone visit  Patient in no significant distress, conversant    Due to this being a TeleHealth encounter, evaluation of the following organ systems is limited: Vitals/Constitutional/EENT/Resp/CV/GI//MS/Neuro/Skin/Heme-Lymph-Imm. Lab Results   Component Value Date    WBC 9.9 08/13/2021    HGB 17.7 08/13/2021    HCT 51.8 08/13/2021     08/13/2021    CHOL 213 (H) 10/06/2020    TRIG 139 10/06/2020    HDL 54 10/06/2020    ALT 58 (H) 10/06/2020    AST 27 10/06/2020     10/06/2020    K 5.0 (H) 10/06/2020     10/06/2020    CREATININE 0.74 10/06/2020    BUN 14 10/06/2020    CO2 23 10/06/2020    TSH 2.140 10/06/2020    PSA 0.54 08/07/2019    LABA1C 5.6 12/23/2020       15 minute call    ASSESSMENT/PLAN:     Diagnosis Orders   1. Viral illness         77-year-old male chills nausea and headaches. Patient reports nausea is mild he is able to drink fluids. Denies cough. Patient is able to speak in full sentences denies need of additional therapies at this time. Rapid COVID and rapid flu tests are ordered. Patient provided education on expectations for course of viral illness recommended therapies and reasons to go to the ER    Return if symptoms worsen or fail to improve, for Follow up with PCP. An  electronic signature was used to authenticate this note.     --ELPIDIO Rodriguez - CNP on 1/12/2022 at 4:11 PM        Pursuant to the emergency declaration under the [facility], [address] [place] [address] and the [facility] Response Supplemental Appropriations Act, this Virtual  Visit was conducted, with patient's consent, to reduce the patient's risk of exposure to COVID-19 and provide continuity of care for an established patient.

## 2022-01-17 ENCOUNTER — OFFICE VISIT (OUTPATIENT)
Dept: FAMILY MEDICINE CLINIC | Age: 37
End: 2022-01-17
Payer: COMMERCIAL

## 2022-01-17 VITALS
HEART RATE: 76 BPM | SYSTOLIC BLOOD PRESSURE: 136 MMHG | DIASTOLIC BLOOD PRESSURE: 78 MMHG | BODY MASS INDEX: 36.5 KG/M2 | TEMPERATURE: 97.5 F | WEIGHT: 246.4 LBS | HEIGHT: 69 IN | OXYGEN SATURATION: 96 %

## 2022-01-17 DIAGNOSIS — F41.9 ANXIETY AND DEPRESSION: Primary | ICD-10-CM

## 2022-01-17 DIAGNOSIS — J45.40 MODERATE PERSISTENT ASTHMA WITHOUT COMPLICATION: ICD-10-CM

## 2022-01-17 DIAGNOSIS — F32.A ANXIETY AND DEPRESSION: Primary | ICD-10-CM

## 2022-01-17 DIAGNOSIS — R05.9 COUGH: ICD-10-CM

## 2022-01-17 PROCEDURE — 99213 OFFICE O/P EST LOW 20 MIN: CPT | Performed by: FAMILY MEDICINE

## 2022-01-17 NOTE — PROGRESS NOTES
Chief Complaint   Patient presents with    3 Month Follow-Up    Anxiety    Depression       HPI:  Cassidy Conley is a 43 y.o. male    Follow up  Med discussion     Weight stable     lamictal XR  Working pretty well    Wt Readings from Last 3 Encounters:   01/17/22 246 lb 6.4 oz (111.8 kg)   11/01/21 242 lb (109.8 kg)   08/13/21 236 lb (107 kg)         Past Medical History:   Diagnosis Date    Asthma     Dermatitis     Fatty liver     Gastroesophageal reflux disease with hiatal hernia     Hypertension     Obesity (BMI 30-39. 9)      Past Surgical History:   Procedure Laterality Date    LYMPH NODE BIOPSY      surgery due to cat scratch disease age 1   [place] WISDOM TOOTH EXTRACTION       History reviewed. No pertinent family history. Social History     Socioeconomic History    Marital status:      Spouse name: None    Number of children: 1    Years of education: None    Highest education level: None   Occupational History    None   Tobacco Use    Smoking status: Never Smoker    Smokeless tobacco: Never Used   Substance and Sexual Activity    Alcohol use: No     Alcohol/week: 0.0 standard drinks    Drug use: No    Sexual activity: None   Other Topics Concern    None   Social History Narrative    None     Social Determinants of Health     Financial Resource Strain: Low Risk     Difficulty of Paying Living Expenses: Not hard at all   Food Insecurity: No Food Insecurity    Worried About Running Out of Food in the Last Year: Never true    Michelle of Food in the Last Year: Never true   Transportation Needs: No Transportation Needs    Lack of Transportation (Medical): No    Lack of Transportation (Non-Medical):  No   Physical Activity:     Days of Exercise per Week: Not on file    Minutes of Exercise per Session: Not on file   Stress:     Feeling of Stress : Not on file   Social Connections:     Frequency of Communication with Friends and Family: Not on file    Frequency of Social Gatherings with Friends and Family: Not on file    Attends Alevism Services: Not on file    Active Member of Clubs or Organizations: Not on file    Attends Club or Organization Meetings: Not on file    Marital Status: Not on file   Intimate Partner Violence:     Fear of Current or Ex-Partner: Not on file    Emotionally Abused: Not on file    Physically Abused: Not on file    Sexually Abused: Not on file   Housing Stability:     Unable to Pay for Housing in the Last Year: Not on file    Number of [place] in the Last Year: Not on file    Unstable Housing in the Last Year: Not on file     Current Outpatient Medications   Medication Sig Dispense Refill    fluticasone-salmeterol (ADVAIR DISKUS) 100-50 MCG/DOSE diskus inhaler Inhale 1 puff into the lungs every 12 hours 180 each 1    albuterol (PROVENTIL) (2.5 MG/3ML) 0.083% nebulizer solution Take 3 mLs by nebulization every 6 hours as needed for Wheezing 120 each 3    lamoTRIgine (LAMICTAL XR) 200 MG TB24 extended release tablet TAKE 1 TABLET BY MOUTH EVERY DAY 30 tablet 5    albuterol sulfate  (90 Base) MCG/ACT inhaler TAKE 2 PUFFS BY MOUTH EVERY 6 HOURS AS NEEDED FOR WHEEZE 6.7 each 5    hydrOXYzine (VISTARIL) 25 MG capsule Take 1 capsule by mouth 3 times daily as needed for Anxiety 30 capsule 2    tadalafil (CIALIS) 5 MG tablet Take 1 tablet by mouth daily 90 tablet 1    testosterone cypionate (DEPOTESTOTERONE CYPIONATE) 200 MG/ML injection Inject 1 mL into the muscle every 14 days for 180 days.  6 mL 1    omeprazole (PRILOSEC) 40 MG delayed release capsule TAKE 1 CAPSULE BY MOUTH EVERY DAY 90 capsule 2    metoprolol (LOPRESSOR) 100 MG tablet TAKE 1 TABLET BY MOUTH TWICE A  tablet 1    EPINEPHrine (EPIPEN 2-AUGUSTUS) 0.3 MG/0.3ML SOAJ injection Inject 0.3 mLs into the muscle once as needed (allergic reaction/bee sting) Use as directed for allergic reaction  Dispense 1 two pack (epi pen) 1 each 0     No current facility-administered medications for this visit. Allergies   Allergen Reactions    Amlodipine Swelling    Bee Venom     Compazine [Prochlorperazine Maleate] Swelling    Lisinopril Hives and Swelling     Skin tightens on his face and gets hives       Review of Systems:   General ROS: no f/c/n/v  Respiratory ROS: no cough, shortness of breath, or wheezing  Cardiovascular ROS: no chest pain or dyspnea on exertion  Gastrointestinal ROS: no abdominal pain, change in bowel habits, or black or bloody stools  Genito-Urinary ROS: per HPI  Musculoskeletal ROS: negative for - gait disturbance, joint pain or joint stiffness  Neurological ROS: negative for - behavioral changes, memory loss, numbness/tingling, tremors or weakness    In general patient otherwise reports feeling well. Physical Exam:  /78   Pulse 76   Temp 97.5 °F (36.4 °C)   Ht 5' 9\" (1.753 m)   Wt 246 lb 6.4 oz (111.8 kg)   SpO2 96%   BMI 36.39 kg/m²     Gen: Well, NAD, Alert, Oriented x 3   HEENT: EOMI, eyes clear, MMM  Skin: no rash or jaundice  Lungs: CTA B w/out Rales/Wheezes/Rhonchi, Good respiratory effort   Heart: RRR, S1S2, w/out M/R/G, non-displaced PMI   Psych: generally euthymic here today with intact insight and judgement, no SI/HI  Neuro: Neurovascularly intact w/ Sensory/Motor intact UE/LE Bilaterally. Lab Results   Component Value Date    WBC 9.9 08/13/2021    HGB 17.7 08/13/2021    HCT 51.8 08/13/2021     08/13/2021    CHOL 213 (H) 10/06/2020    TRIG 139 10/06/2020    HDL 54 10/06/2020    ALT 58 (H) 10/06/2020    AST 27 10/06/2020     10/06/2020    K 5.0 (H) 10/06/2020     10/06/2020    CREATININE 0.74 10/06/2020    BUN 14 10/06/2020    CO2 23 10/06/2020    TSH 2.140 10/06/2020    PSA 0.54 08/07/2019    LABA1C 5.6 12/23/2020         A&P   Diagnosis Orders   1. Anxiety and depression     2. Cough  fluticasone-salmeterol (ADVAIR DISKUS) 100-50 MCG/DOSE diskus inhaler   3.  Moderate persistent asthma without complication fluticasone-salmeterol (ADVAIR DISKUS) 100-50 MCG/DOSE diskus inhaler       Change to XR lamictal     Loperamide nightly for now     Continue omeprazole            Regla Kelly MD

## 2022-02-10 ENCOUNTER — OFFICE VISIT (OUTPATIENT)
Dept: FAMILY MEDICINE CLINIC | Age: 37
End: 2022-02-10
Payer: COMMERCIAL

## 2022-02-10 VITALS
SYSTOLIC BLOOD PRESSURE: 118 MMHG | HEIGHT: 69 IN | OXYGEN SATURATION: 98 % | WEIGHT: 253 LBS | TEMPERATURE: 97.9 F | BODY MASS INDEX: 37.47 KG/M2 | HEART RATE: 69 BPM | DIASTOLIC BLOOD PRESSURE: 80 MMHG

## 2022-02-10 DIAGNOSIS — S43.401A SPRAIN OF RIGHT SHOULDER, UNSPECIFIED SHOULDER SPRAIN TYPE, INITIAL ENCOUNTER: Primary | ICD-10-CM

## 2022-02-10 PROCEDURE — 99213 OFFICE O/P EST LOW 20 MIN: CPT | Performed by: FAMILY MEDICINE

## 2022-02-10 RX ORDER — NAPROXEN 500 MG/1
500 TABLET ORAL 2 TIMES DAILY PRN
Qty: 60 TABLET | Refills: 0 | Status: SHIPPED | OUTPATIENT
Start: 2022-02-10 | End: 2022-03-07

## 2022-02-10 RX ORDER — HYDROCODONE BITARTRATE AND ACETAMINOPHEN 5; 325 MG/1; MG/1
1 TABLET ORAL EVERY 6 HOURS PRN
Qty: 20 TABLET | Refills: 0 | Status: SHIPPED | OUTPATIENT
Start: 2022-02-10 | End: 2022-02-15

## 2022-02-10 NOTE — PROGRESS NOTES
Subjective:     Chief Complaint   Patient presents with    Fall     fell down stairs at gym x 1/24/22, getting better but still having issues, right shoulder         Tomas Avery is a 43 y.o. male who presents with right shoulder pain. The symptoms began 2 weeks ago. Symptoms were initiated by injury. Fell down stairs, reached to grab handrail. Pain is located lateral shoulder, hurts to reach behind, pain at 90 degrees, does have full ROM Discomfort is described as aching and sharp / stabbing. Symptoms are exacerbated by repetitive movements, overhead movements and lying on the shoulder. Evaluation to date: none. Therapy to date includes rest, ice, avoidance of offending activity and ibuprofen. Patient's medications, allergies, past medical, surgical, social and family histories were reviewed and updated as appropriate. Objective:      /80 (Site: Left Upper Arm)   Pulse 69   Temp 97.9 °F (36.6 °C)   Ht 5' 9\" (1.753 m)   Wt 253 lb (114.8 kg)   SpO2 98%   BMI 37.36 kg/m²   Right shoulder: Full ROM with pain, significant pain and weakness with internal rotation and with lateral abduction against resistance. Tender diffusely about shoulder    Left shoulder: Normal active ROM, no tenderness, no impingement sign       Assessment:      Diagnosis Orders   1. Sprain of right shoulder, unspecified shoulder sprain type, initial encounter  XR SHOULDER RIGHT (MIN 2 VIEWS)    HYDROcodone-acetaminophen (NORCO) 5-325 MG per tablet    naproxen (NAPROSYN) 500 MG tablet    MRI SHOULDER RIGHT WO CONTRAST         Plan:      Natural history and expected course discussed. Questions answered. Reduction in offending activity. Gentle ROM exercises  Plain film x-rays. MRI.       PT vs ortho depending upon extent of injury    Naproxen BID  norco for nighttime pain    Dee Chandler MD

## 2022-02-15 ENCOUNTER — TELEPHONE (OUTPATIENT)
Dept: FAMILY MEDICINE CLINIC | Age: 37
End: 2022-02-15

## 2022-02-15 DIAGNOSIS — S43.401A SPRAIN OF RIGHT SHOULDER, UNSPECIFIED SHOULDER SPRAIN TYPE, INITIAL ENCOUNTER: Primary | ICD-10-CM

## 2022-02-15 NOTE — TELEPHONE ENCOUNTER
[facility] authorization department calling us to let us know that the MRI order for right shoulder was denied by [facility]. [place] wants pt to do physical therapy instead. They said that [place] does not give denial letters. To reach [place] to talk to them more about this # by [phone]    Pt was told to follow up.

## 2022-02-16 NOTE — TELEPHONE ENCOUNTER
Pt sent my chart message. Asking for steroids for an asthma flare up. See pt messages. Recko gone for the day. Can you please send something in/.

## 2022-02-16 NOTE — TELEPHONE ENCOUNTER
He should probably do a visit even if with ready care in regards to an asthma flair if he thinks he needs steroids.

## 2022-02-17 RX ORDER — METHYLPREDNISOLONE 4 MG/1
TABLET ORAL
Qty: 1 KIT | Refills: 0 | Status: SHIPPED | OUTPATIENT
Start: 2022-02-17 | End: 2022-05-05

## 2022-02-17 NOTE — TELEPHONE ENCOUNTER
Spoke to patient, did not agree with message advised by мария. Pt states he wants message sent to Dr. Tianna Saul.

## 2022-02-17 NOTE — TELEPHONE ENCOUNTER
Orders Placed This Encounter   Medications    methylPREDNISolone (MEDROL DOSEPACK) 4 MG tablet     Sig: Take by mouth. Dispense:  1 kit     Refill:  0       The above med(s) were e-scripted to the patient's pharmacy.    Please advise patient  Jimbo Jones MD

## 2022-03-06 DIAGNOSIS — S43.401A SPRAIN OF RIGHT SHOULDER, UNSPECIFIED SHOULDER SPRAIN TYPE, INITIAL ENCOUNTER: ICD-10-CM

## 2022-03-07 RX ORDER — NAPROXEN 500 MG/1
TABLET ORAL
Qty: 60 TABLET | Refills: 0 | Status: SHIPPED | OUTPATIENT
Start: 2022-03-07 | End: 2022-04-04

## 2022-03-07 NOTE — TELEPHONE ENCOUNTER
Future Appointments    This patient does not currently have any appointments scheduled.     Past Visits    Date Provider Specialty Visit Type Primary Dx   02/10/2022 Chago Mcclure MD Family Medicine Office Visit Sprain of right shoulder, unspecified shoulder sprain type, initial encounter

## 2022-03-10 ENCOUNTER — TELEPHONE (OUTPATIENT)
Dept: FAMILY MEDICINE CLINIC | Age: 37
End: 2022-03-10

## 2022-03-10 NOTE — TELEPHONE ENCOUNTER
Pt calling about MRI being denies. States he is unable to do  PT due to how much pain his shoulder is in. Needs order to say what you suspect it is not just shoulder pain.  Has appt tomorrow to discuss this with you

## 2022-03-21 DIAGNOSIS — I10 ESSENTIAL HYPERTENSION: ICD-10-CM

## 2022-03-22 RX ORDER — METOPROLOL TARTRATE 100 MG/1
TABLET ORAL
Qty: 180 TABLET | Refills: 1 | Status: SHIPPED | OUTPATIENT
Start: 2022-03-22

## 2022-04-03 DIAGNOSIS — S43.401A SPRAIN OF RIGHT SHOULDER, UNSPECIFIED SHOULDER SPRAIN TYPE, INITIAL ENCOUNTER: ICD-10-CM

## 2022-04-04 RX ORDER — NAPROXEN 500 MG/1
TABLET ORAL
Qty: 60 TABLET | Refills: 0 | Status: SHIPPED | OUTPATIENT
Start: 2022-04-04 | End: 2022-05-06

## 2022-04-04 NOTE — TELEPHONE ENCOUNTER
Future Appointments    This patient does not currently have any appointments scheduled.     Past Visits    Date Provider Specialty Visit Type Primary Dx   02/10/2022 Kristy Disla MD Family Medicine Office Visit Sprain of right shoulder, unspecified shoulder sprain type, initial encounter

## 2022-04-09 DIAGNOSIS — K21.9 GASTRO-ESOPHAGEAL REFLUX DISEASE WITHOUT ESOPHAGITIS: ICD-10-CM

## 2022-04-10 NOTE — TELEPHONE ENCOUNTER
Future Appointments    This patient does not currently have any appointments scheduled.     Past Visits    Date Provider Specialty Visit Type Primary Dx   02/10/2022 Aubrey Perdue MD Family Medicine Office Visit Sprain of right shoulder, unspecified shoulder sprain type, initial encounter   01/17/2022 Aubrey Perdue MD Family Medicine Office Visit Anxiety and depression     Sending mychart msg for July appt

## 2022-04-11 RX ORDER — OMEPRAZOLE 40 MG/1
CAPSULE, DELAYED RELEASE ORAL
Qty: 90 CAPSULE | Refills: 2 | Status: SHIPPED | OUTPATIENT
Start: 2022-04-11

## 2022-05-03 DIAGNOSIS — E29.1 HYPOGONADISM IN MALE: ICD-10-CM

## 2022-05-03 DIAGNOSIS — N52.9 ERECTILE DYSFUNCTION, UNSPECIFIED ERECTILE DYSFUNCTION TYPE: ICD-10-CM

## 2022-05-04 RX ORDER — TADALAFIL 5 MG/1
5 TABLET ORAL DAILY
Qty: 90 TABLET | Refills: 1 | Status: SHIPPED | OUTPATIENT
Start: 2022-05-04

## 2022-05-04 RX ORDER — TESTOSTERONE CYPIONATE 200 MG/ML
200 INJECTION INTRAMUSCULAR
Qty: 6 ML | Refills: 1 | Status: SHIPPED | OUTPATIENT
Start: 2022-05-04 | End: 2022-10-31

## 2022-05-04 RX ORDER — TESTOSTERONE CYPIONATE 200 MG/ML
200 INJECTION INTRAMUSCULAR
Qty: 6 ML | OUTPATIENT
Start: 2022-05-04 | End: 2022-10-31

## 2022-05-05 ENCOUNTER — OFFICE VISIT (OUTPATIENT)
Dept: FAMILY MEDICINE CLINIC | Age: 37
End: 2022-05-05
Payer: COMMERCIAL

## 2022-05-05 VITALS
WEIGHT: 234 LBS | SYSTOLIC BLOOD PRESSURE: 112 MMHG | HEIGHT: 68 IN | DIASTOLIC BLOOD PRESSURE: 84 MMHG | OXYGEN SATURATION: 98 % | BODY MASS INDEX: 35.46 KG/M2 | HEART RATE: 69 BPM

## 2022-05-05 DIAGNOSIS — L57.0 ACTINIC KERATOSES: ICD-10-CM

## 2022-05-05 DIAGNOSIS — L73.9 FOLLICULITIS: Primary | ICD-10-CM

## 2022-05-05 DIAGNOSIS — B07.9 VIRAL WARTS, UNSPECIFIED TYPE: ICD-10-CM

## 2022-05-05 DIAGNOSIS — S96.911A STRAIN OF RIGHT ANKLE, INITIAL ENCOUNTER: ICD-10-CM

## 2022-05-05 PROCEDURE — 17003 DESTRUCT PREMALG LES 2-14: CPT | Performed by: FAMILY MEDICINE

## 2022-05-05 PROCEDURE — 17110 DESTRUCTION B9 LES UP TO 14: CPT | Performed by: FAMILY MEDICINE

## 2022-05-05 PROCEDURE — 17000 DESTRUCT PREMALG LESION: CPT | Performed by: FAMILY MEDICINE

## 2022-05-05 PROCEDURE — 99214 OFFICE O/P EST MOD 30 MIN: CPT | Performed by: FAMILY MEDICINE

## 2022-05-05 ASSESSMENT — ENCOUNTER SYMPTOMS: COLOR CHANGE: 1

## 2022-05-05 NOTE — PATIENT INSTRUCTIONS
Patient will use kitchen vinegar soaks on the back for 3 to 4 minutes before bathing 2-3 times a week to help do natural exfoliation of the skin on the back and prevent folliculitis    Educated patient medial ankle likely partial tendon tear that is healed in a  position. May be pushing on nerves. Further treatment should be done with primary care if needed. Warts or viruses. Be certain to hand wash frequently. Cryotherapy instructions    Post op instructions given. A printed copy provided. It is best to leave blisters alone if they form for the first 1-3 days to allow the desired damaged tissue (precancer lesion, wart, or whatever lesion is being removed) to separate from healthy tissue. The area should be covered with a bandage to prevent blister breakage and dirt exposure. The wounds should remain dry while there is a blister, therefore if this is a sweaty location, like the foot ,you may need to change clothing, such as socks, multiple times per day. When the blister(s) pop or patient removes the top as instructed between day 3-5, apply antibiotic (NOT triple antibiotic, one brand is Neosporin) ointment, usually Bacitracin, and a bandage to affected area(s). The ointment should be applied to the open area as long as it is not covered with skin. Exposed tissue is meant to be moist.      Once a scab is formed the patient may stop applying ointment. The scab may appear yellow while moist, don't confuse this with infection. If the wound is infection pus will drain from the site. If this treatment was for a large wart you may note that a plug of skin may fall out of the area that was treated. That is the center of the wart and it is appropriate for it to come out. If exposed skin remains, treat that area as you would a ruptured blister as mentioned above.     Bacitracin sample supplied            Patient Education        Warts: Care Instructions  Overview  A wart is a harmless skin growth caused by a virus. The virus makes the top layer of skin grow quickly, causing a wart. Warts usually go away on their own in months or years. There are several types of warts. Common warts appear mostoften on the hands, but they may be anywhere on the body. Warts spread easily. You can reinfect yourself by touching the wart and then touching another part of your body. You can infect others by sharing towels,razors, or other personal items. Most warts don't need treatment. But if warts cause pain or spread, your doctor may recommend that you use an over-the-counter treatment. Or your doctor may prescribe a stronger medicine to put on warts or may inject them with medicine. The doctor also can remove warts through surgery or by freezing them. Follow-up care is a key part of your treatment and safety. Be sure to make and go to all appointments, and call your doctor if you are having problems. It's also a good idea to know your test results and keep alist of the medicines you take. How can you care for yourself at home?  Use salicylic acid or duct tape as your doctor directs. You put the medicine or the tape on a wart for several days and then file down the dead skin on the wart. You use the salicylic acid treatment for 2 to 3 months or the tape for 1 to 2 months.  If your doctor prescribes medicine to put on warts, use it exactly as directed. Call your doctor if you think you are having a problem with your medicine. To avoid spreading warts   Keep warts covered with a bandage or athletic tape.  Don't bite your nails or cuticles. This may spread warts from one finger to another. When should you call for help? Call your doctor now or seek immediate medical care if:     You have signs of infection, such as:  ? Increased pain, swelling, warmth, or redness. ? Red streaks leading from a wart. ? Pus draining from a wart. ? A fever.    Watch closely for changes in your health, and be sure to contact your doctor if:     You do not get better as expected. Where can you learn more? Go to https://[email or web address]. org and sign in to your [employer] account. Enter R507 in the [facility] box to learn more about \"Warts: Care Instructions. \"     If you do not have an account, please click on the \"Sign Up Now\" link. Current as of: November 15, 2021               Content Version: 13.2  © 2006-2022 [employer]. Care instructions adapted under license by [facility] ([facility]). If you have questions about a medical condition or this instruction, always ask your healthcare professional. [address] any warranty or liability for your use of this information.

## 2022-05-05 NOTE — PROGRESS NOTES
Diagnosis Orders   1. Folliculitis     2. Actinic keratoses  CT DESTRUC PREMALIGNANT, FIRST LESION    CT DESTRUC PREMALIGNANT,2-14 LESIONS   3. Viral warts, unspecified type  CT DESTRUCTION BENIGN LESIONS UP TO 14   4. Strain of right ankle, initial encounter         Return in about 1 year (around 5/5/2023) for 15 min skin check. Orders Placed This Encounter   Procedures    CT DESTRUCTION BENIGN LESIONS UP TO 15    CT [place] PREMALIGNANT, FIRST LESION     [facility]       Angélica Ding was seen today for other. Diagnoses and all orders for this visit:    Folliculitis    Actinic keratoses  -     CT DESTRUC PREMALIGNANT, FIRST LESION  -     CT DESTRUC PREMALIGNANT,2-14 LESIONS    Viral warts, unspecified type  -     CT DESTRUCTION BENIGN LESIONS UP TO 14    Strain of right ankle, initial encounter        Return in about 1 year (around 5/5/2023) for 15 min skin check. Patient Instructions     Patient will use kitchen vinegar soaks on the back for 3 to 4 minutes before bathing 2-3 times a week to help do natural exfoliation of the skin on the back and prevent folliculitis    Educated patient medial ankle likely partial tendon tear that is healed in a  position. May be pushing on nerves. Further treatment should be done with primary care if needed. Warts or viruses. Be certain to hand wash frequently. Cryotherapy instructions    Post op instructions given. A printed copy provided. It is best to leave blisters alone if they form for the first 1-3 days to allow the desired damaged tissue (precancer lesion, wart, or whatever lesion is being removed) to separate from healthy tissue. The area should be covered with a bandage to prevent blister breakage and dirt exposure. The wounds should remain dry while there is a blister, therefore if this is a sweaty location, like the foot ,you may need to change clothing, such as socks, multiple times per day.        When the blister(s) pop or patient removes the top as instructed between day 3-5, apply antibiotic (NOT triple antibiotic, one brand is Neosporin) ointment, usually Bacitracin, and a bandage to affected area(s). The ointment should be applied to the open area as long as it is not covered with skin. Exposed tissue is meant to be moist.      Once a scab is formed the patient may stop applying ointment. The scab may appear yellow while moist, don't confuse this with infection. If the wound is infection pus will drain from the site. If this treatment was for a large wart you may note that a plug of skin may fall out of the area that was treated. That is the center of the wart and it is appropriate for it to come out. If exposed skin remains, treat that area as you would a ruptured blister as mentioned above. Bacitracin sample supplied            Patient Education        Warts: Care Instructions  Overview  A wart is a harmless skin growth caused by a virus. The virus makes the top layer of skin grow quickly, causing a wart. Warts usually go away on their own in months or years. There are several types of warts. Common warts appear mostoften on the hands, but they may be anywhere on the body. Warts spread easily. You can reinfect yourself by touching the wart and then touching another part of your body. You can infect others by sharing towels,razors, or other personal items. Most warts don't need treatment. But if warts cause pain or spread, your doctor may recommend that you use an over-the-counter treatment. Or your doctor may prescribe a stronger medicine to put on warts or may inject them with medicine. The doctor also can remove warts through surgery or by freezing them. Follow-up care is a key part of your treatment and safety. Be sure to make and go to all appointments, and call your doctor if you are having problems. It's also a good idea to know your test results and keep alist of the medicines you take.   How can you care for yourself at home?  Use salicylic acid or duct tape as your doctor directs. You put the medicine or the tape on a wart for several days and then file down the dead skin on the wart. You use the salicylic acid treatment for 2 to 3 months or the tape for 1 to 2 months.  If your doctor prescribes medicine to put on warts, use it exactly as directed. Call your doctor if you think you are having a problem with your medicine. To avoid spreading warts   Keep warts covered with a bandage or athletic tape.  Don't bite your nails or cuticles. This may spread warts from one finger to another. When should you call for help? Call your doctor now or seek immediate medical care if:     You have signs of infection, such as:  ? Increased pain, swelling, warmth, or redness. ? Red streaks leading from a wart. ? Pus draining from a wart. ? A fever. Watch closely for changes in your health, and be sure to contact your doctor if:     You do not get better as expected. Where can you learn more? Go to https://[employer]peaSmallWorld.BrandBeau. org and sign in to your [employer] account. Enter I436 in the [facility] box to learn more about \"Warts: Care Instructions. \"     If you do not have an account, please click on the \"Sign Up Now\" link. Current as of: November 15, 2021               Content Version: 13.2  © 3365-9988 [employer]. Care instructions adapted under license by [facility] ([facility]). If you have questions about a medical condition or this instruction, always ask your healthcare professional. [address] any warranty or liability for your use of this information. Patient has noted irregular growth on his nose x2. Came up relatively quickly. Lots of sun exposure.   No history of ulcer of the skin in the past.    Patient has a wart on his left index finger he is tried treating over-the-counter with liquid Compound W and its not been successful. He is also noticed a new one in his knee. Patient has noticed on his right ankle swelling that can sometimes be painful. Does not change in size. No redness. No limited range of motion. Current Outpatient Medications on File Prior to Visit   Medication Sig Dispense Refill    tadalafil (CIALIS) 5 MG tablet Take 1 tablet by mouth daily 90 tablet 1    testosterone cypionate (DEPOTESTOTERONE CYPIONATE) 200 MG/ML injection Inject 1 mL into the muscle every 14 days for 180 days. 6 mL 1    omeprazole (PRILOSEC) 40 MG delayed release capsule TAKE 1 CAPSULE BY MOUTH EVERY DAY 90 capsule 2    naproxen (NAPROSYN) 500 MG tablet TAKE 1 TABLET BY MOUTH TWICE A DAY AS NEEDED FOR PAIN 60 tablet 0    metoprolol (LOPRESSOR) 100 MG tablet TAKE 1 TABLET BY MOUTH TWICE A  tablet 1    fluticasone-salmeterol (ADVAIR DISKUS) 100-50 MCG/DOSE diskus inhaler Inhale 1 puff into the lungs every 12 hours 180 each 1    albuterol (PROVENTIL) (2.5 MG/3ML) 0.083% nebulizer solution Take 3 mLs by nebulization every 6 hours as needed for Wheezing 120 each 3    lamoTRIgine (LAMICTAL XR) 200 MG TB24 extended release tablet TAKE 1 TABLET BY MOUTH EVERY DAY 30 tablet 5    albuterol sulfate  (90 Base) MCG/ACT inhaler TAKE 2 PUFFS BY MOUTH EVERY 6 HOURS AS NEEDED FOR WHEEZE 6.7 each 5    hydrOXYzine (VISTARIL) 25 MG capsule Take 1 capsule by mouth 3 times daily as needed for Anxiety 30 capsule 2    EPINEPHrine (EPIPEN 2-AUGUSTUS) 0.3 MG/0.3ML SOAJ injection Inject 0.3 mLs into the muscle once as needed (allergic reaction/bee sting) Use as directed for allergic reaction  Dispense 1 two pack (epi pen) 1 each 0     No current facility-administered medications on file prior to visit. Amlodipine, Bee venom, Compazine [prochlorperazine maleate], and Lisinopril    Review of Systems   Constitutional: Negative for chills and fever. Skin: Positive for color change.    Allergic/Immunologic: Negative for environmental allergies, food allergies and immunocompromised state. Hematological: Negative for adenopathy. Does not bruise/bleed easily. Psychiatric/Behavioral: Negative for behavioral problems and sleep disturbance. /84   Pulse 69   Ht 5' 8\" (1.727 m)   Wt 234 lb (106.1 kg)   SpO2 98%   BMI 35.58 kg/m²   Physical Exam  Constitutional:       General: He is not in acute distress. Appearance: Normal appearance. He is well-developed. He is not toxic-appearing. HENT:      Head: Normocephalic and atraumatic. Right Ear: Hearing and tympanic membrane normal.      Left Ear: Hearing and tympanic membrane normal.      Nose: Nose normal. No nasal deformity. Eyes:      General: Lids are normal.         Right eye: No discharge. Left eye: No discharge. Conjunctiva/sclera: Conjunctivae normal.      Pupils: Pupils are equal, round, and reactive to light. Neck:      Thyroid: No thyroid mass or thyromegaly. Vascular: No JVD. Trachea: Trachea and phonation normal.   Cardiovascular:      Rate and Rhythm: Normal rate and regular rhythm. Pulmonary:      Effort: No accessory muscle usage or respiratory distress. Musculoskeletal:      Cervical back: Full passive range of motion without pain. Comments: FROM all large muscle groups and joints as witnessed when walking to exam table, getting on, and getting off the exam table. Skin:     General: Skin is warm and dry. Findings: No rash. Comments: Asik back there is a nodule with erythematous top consistent with excoriated folliculitis. Nose erythematous lesion x2 irregular border    Left index finger and right knee verrucous lesions   Neurological:      Mental Status: He is alert. Motor: No tremor or atrophy. Gait: Gait normal.   Psychiatric:         Speech: Speech normal.         Behavior: Behavior normal.         Thought Content:  Thought content normal.           Procedure consent wart x2, AK x2  The procedure was discussed with the patient. All questions were answered and alternative options discussed. The patient is aware of the risks of bleeding, infection, unsatisfactory scar result. Informed consent paperwork was signed by the patient. Liquid nitrogen was applied for 2-6 seconds to affected areas with thaw allowed between dosing for a total of 2 applications. Applied to 4 lesion(s). The patient tolerated the procedure well. Diagnosis Orders   1. Folliculitis     2. Actinic keratoses  [place] DESTRUC PREMALIGNANT, FIRST LESION    [place] DESTRUC PREMALIGNANT,2-14 LESIONS   3. Viral warts, unspecified type  [place] DESTRUCTION BENIGN LESIONS UP TO 14   4. Strain of right ankle, initial encounter         Return in about 1 year (around 5/5/2023) for 15 min skin check. Orders Placed This Encounter   Procedures    [place] DESTRUCTION BENIGN LESIONS UP TO 14    [place] PREMALIGNANT, FIRST LESION    [place] [address] LESIONS               Patient Instructions     Patient will use kitchen vinegar soaks on the back for 3 to 4 minutes before bathing 2-3 times a week to help do natural exfoliation of the skin on the back and prevent folliculitis    Educated patient medial ankle likely partial tendon tear that is healed in a  position. May be pushing on nerves. Further treatment should be done with primary care if needed. Warts or viruses. Be certain to hand wash frequently. Cryotherapy instructions    Post op instructions given. A printed copy provided. It is best to leave blisters alone if they form for the first 1-3 days to allow the desired damaged tissue (precancer lesion, wart, or whatever lesion is being removed) to separate from healthy tissue. The area should be covered with a bandage to prevent blister breakage and dirt exposure.       The wounds should remain dry while there is a blister, therefore if this is a sweaty location, like the foot ,you may need to change clothing, such as socks, multiple times per day. When the blister(s) pop or patient removes the top as instructed between day 3-5, apply antibiotic (NOT triple antibiotic, one brand is Neosporin) ointment, usually Bacitracin, and a bandage to affected area(s). The ointment should be applied to the open area as long as it is not covered with skin. Exposed tissue is meant to be moist.      Once a scab is formed the patient may stop applying ointment. The scab may appear yellow while moist, don't confuse this with infection. If the wound is infection pus will drain from the site. If this treatment was for a large wart you may note that a plug of skin may fall out of the area that was treated. That is the center of the wart and it is appropriate for it to come out. If exposed skin remains, treat that area as you would a ruptured blister as mentioned above. Bacitracin sample supplied            Patient Education        Warts: Care Instructions  Overview  A wart is a harmless skin growth caused by a virus. The virus makes the top layer of skin grow quickly, causing a wart. Warts usually go away on their own in months or years. There are several types of warts. Common warts appear mostoften on the hands, but they may be anywhere on the body. Warts spread easily. You can reinfect yourself by touching the wart and then touching another part of your body. You can infect others by sharing towels,razors, or other personal items. Most warts don't need treatment. But if warts cause pain or spread, your doctor may recommend that you use an over-the-counter treatment. Or your doctor may prescribe a stronger medicine to put on warts or may inject them with medicine. The doctor also can remove warts through surgery or by freezing them. Follow-up care is a key part of your treatment and safety. Be sure to make and go to all appointments, and call your doctor if you are having problems.  It's also a good idea to know your test results and keep alist of the medicines you take. How can you care for yourself at home?  Use salicylic acid or duct tape as your doctor directs. You put the medicine or the tape on a wart for several days and then file down the dead skin on the wart. You use the salicylic acid treatment for 2 to 3 months or the tape for 1 to 2 months.  If your doctor prescribes medicine to put on warts, use it exactly as directed. Call your doctor if you think you are having a problem with your medicine. To avoid spreading warts   Keep warts covered with a bandage or athletic tape.  Don't bite your nails or cuticles. This may spread warts from one finger to another. When should you call for help? Call your doctor now or seek immediate medical care if:     You have signs of infection, such as:  ? Increased pain, swelling, warmth, or redness. ? Red streaks leading from a wart. ? Pus draining from a wart. ? A fever. Watch closely for changes in your health, and be sure to contact your doctor if:     You do not get better as expected. Where can you learn more? Go to https://MindShare NetworkspeSueEasyeb.GMG33. org and sign in to your [employer] account. Enter D744 in the [facility] box to learn more about \"Warts: Care Instructions. \"     If you do not have an account, please click on the \"Sign Up Now\" link. Current as of: November 15, 2021               Content Version: 13.2  © 2006-2022 Healthwise, [facility]. Care instructions adapted under license by [facility] ([facility]). If you have questions about a medical condition or this instruction, always ask your healthcare professional. [place] [address] any warranty or liability for your use of this information. DO NOT USE TRIPLE ANTIBIOTIC ON THE WOUND    It is best to leave blisters alone if they form. They should be covered with a bandage to prevent blister breakage and dirt exposure.   The wounds should remain dry while there is a blister, therefore if this is a sweaty location like the foot you may need to change socks multiple times per day. If blister(s) pop or patient pops with a sterilized needle, apply antibiotic (NOT triple antibiotic, one brand is Neosporin) ointment and a bandage to affected area(s). The ointment should be applied to the open area as long as it is not covered with skin. Exposed tissue is meant to be moist.  Once a scab formed she may stop applying ointment. If this treatment was for a large wart you may note that a plug of skin may fall out of the area that was treated. That is the center of the wart and it is appropriate for it to come out. If exposed skin remains, treat that area as you would a ruptured blister as mentioned above.

## 2022-05-06 DIAGNOSIS — S43.401A SPRAIN OF RIGHT SHOULDER, UNSPECIFIED SHOULDER SPRAIN TYPE, INITIAL ENCOUNTER: ICD-10-CM

## 2022-05-06 RX ORDER — NAPROXEN 500 MG/1
TABLET ORAL
Qty: 60 TABLET | Refills: 0 | Status: SHIPPED | OUTPATIENT
Start: 2022-05-06

## 2022-05-11 ENCOUNTER — TELEPHONE (OUTPATIENT)
Dept: FAMILY MEDICINE CLINIC | Age: 37
End: 2022-05-11

## 2022-05-11 DIAGNOSIS — S43.401A SPRAIN OF RIGHT SHOULDER, UNSPECIFIED SHOULDER SPRAIN TYPE, INITIAL ENCOUNTER: Primary | ICD-10-CM

## 2022-05-11 DIAGNOSIS — S43.431A TEAR OF RIGHT GLENOID LABRUM, INITIAL ENCOUNTER: ICD-10-CM

## 2022-05-19 ENCOUNTER — TELEPHONE (OUTPATIENT)
Dept: FAMILY MEDICINE CLINIC | Age: 37
End: 2022-05-19

## 2022-05-19 NOTE — TELEPHONE ENCOUNTER
Diagnosis Orders   1. Sprain of right shoulder, unspecified shoulder sprain type, initial encounter  MRI SHOULDER RIGHT WO CONTRAST   2.  Tear of right glenoid labrum, initial encounter  MRI SHOULDER RIGHT WO CONTRAST

## 2022-06-02 ENCOUNTER — HOSPITAL ENCOUNTER (OUTPATIENT)
Dept: MRI IMAGING | Age: 37
Discharge: HOME | End: 2022-06-04
Payer: COMMERCIAL

## 2022-06-02 DIAGNOSIS — S43.401A SPRAIN OF RIGHT SHOULDER, UNSPECIFIED SHOULDER SPRAIN TYPE, INITIAL ENCOUNTER: ICD-10-CM

## 2022-06-02 DIAGNOSIS — S43.431A TEAR OF RIGHT GLENOID LABRUM, INITIAL ENCOUNTER: ICD-10-CM

## 2022-06-02 PROCEDURE — 73221 MRI JOINT UPR EXTREM W/O DYE: CPT

## 2022-06-07 DIAGNOSIS — F41.9 ANXIETY AND DEPRESSION: ICD-10-CM

## 2022-06-07 DIAGNOSIS — R93.89 ABNORMAL MRI: Primary | ICD-10-CM

## 2022-06-07 DIAGNOSIS — F32.A ANXIETY AND DEPRESSION: ICD-10-CM

## 2022-06-07 RX ORDER — LAMOTRIGINE 200 MG/1
TABLET, EXTENDED RELEASE ORAL
Qty: 90 TABLET | Refills: 1 | Status: SHIPPED | OUTPATIENT
Start: 2022-06-07

## 2022-06-07 NOTE — TELEPHONE ENCOUNTER
Provider Specialty Visit Type Primary Dx   05/05/2022 Ricardo Braden MD Family Medicine Office Visit Folliculitis   83/[id number] Odalis Vega MD Family Medicine Office Visit Sprain of right shoulder, unspecified shoulder sprain type, initial encounter

## 2022-06-28 ENCOUNTER — HOSPITAL ENCOUNTER (OUTPATIENT)
Dept: PHYSICAL THERAPY | Age: 37
Setting detail: THERAPIES SERIES
Discharge: HOME | End: 2022-06-28
Payer: COMMERCIAL

## 2022-06-28 PROCEDURE — G0283 ELEC STIM OTHER THAN WOUND: HCPCS

## 2022-06-28 PROCEDURE — 97162 PT EVAL MOD COMPLEX 30 MIN: CPT

## 2022-06-28 ASSESSMENT — PAIN DESCRIPTION - PAIN TYPE: TYPE: ACUTE PAIN;CHRONIC PAIN

## 2022-06-28 ASSESSMENT — PAIN DESCRIPTION - ORIENTATION: ORIENTATION: RIGHT

## 2022-06-28 ASSESSMENT — PAIN DESCRIPTION - LOCATION: LOCATION: SHOULDER

## 2022-06-28 ASSESSMENT — PAIN DESCRIPTION - DESCRIPTORS: DESCRIPTORS: ACHING;SORE

## 2022-06-28 NOTE — PLAN OF CARE
Hiren hensley [address]     Ph: [phone]  Fax: [phone]      [] Certification  [] Recertification [x]  Plan of Care  [] Progress Note [] Discharge      Referring Provider: Jamal Huddleston MD      From:  Tomas Smith PT   Patient: Mirella Winslow (70 y.o. male) : 1985 Date: 2022   Medical Diagnosis: Unspecified rotator cuff tear or rupture of unspecified shoulder, not specified as traumatic [M75.100] Rotator cuff tear  Treatment Diagnosis: impaired right shoulder ROM, impaired right shoulder strength, right shoulder pain    Plan of Care/Certification Expiration Date: :     Progress Report Period from:  2022  to 2022    Visits to Date: 1 No Show: 0 Cancelled Appts: 0    OBJECTIVE:   Short Term Goals =Long term goals    Long Term Goals - Time Frame for Long term goals : 4 weeks  Goals Current/ Discharge status Status   Long term goal 1: Patient will report </= 1/10 pain in right shoulder with all reaching. Patient reports 8/10 pain at its worse in right shoulder. New   Long term goal 2: Patient will increase painfree right shoulder AROM to [facility] for improved functional tolerance. AROM RUE (degrees)  R Shoulder Flexion 0-180: 123 deg  R Shoulder Extension 0-45: 50 deg  R Shoulder ABduction 0-180: 125 deg  R Shoulder Int Rotation  0-70: 65 deg supine  R Shoulder Ext Rotation 0-90: 60 deg supine         New   Long term goal 3: Patient will increase strength in right shoulder to 5/5 for improved lifting tolerance.        Strength RUE  R Shoulder Flexion: 4-/5  R Shoulder Extension: 4/5  R Shoulder ABduction: 3+/5  R Shoulder Internal Rotation: 4/5  R Shoulder External Rotation: 4/5  R Elbow Flexion: 5/5  R Elbow Extension: 5/5  Strength LUE  L Shoulder Flexion: 5/5  L Shoulder Extension: 5/5  L Shoulder ABduction: 5/5  L Shoulder Internal Rotation: 5/5  L Shoulder External Rotation: 5/5 New   Long term goal 4: UEFI >/= 65/80 to demonstrate functional improvements. 55/80 New   Long term goal 5: Patient will be independent with HEP. Patient issued HEP. New       Body Structures, Functions, Activity Limitations Requiring Skilled Therapeutic Intervention: Decreased ROM,Decreased strength,Decreased endurance,Increased pain  Assessment: Patient has injury to right shoulder after falling down the steps. Reports since injury he gets a separation feeling when lifting his arm. MRI did find mulitple issues including possible glenoid labrum tear. Patient demonstrates limited ROM with pain and decreased strength in right shoulder compared to left. Further PT recommended to improve strength, ROM, and decrease pain for overall quality of life. Therapy Prognosis: Good,Fair      PT Education: PT Role;Goals;Plan of Care;Home Exercise Program    PLAN: [x] Evaluate and Treat  Frequency/Duration:  Plan Frequency: 2  Plan weeks: 4  Current Treatment Recommendations: Strengthening,ROM,Neuromuscular re-education,Manual Therapy - Soft Tissue Mobilization,Pain management,Home exercise program,Modalities,Equipment evaluation, education, & procurement     Precautions:                            Patient Status:[x] Continue/ Initiate plan of Care    [] Discharge PT. Recommend pt continue with HEP. [] Additional visits requested, Please re-certify for additional visits:    [] Hold         Signature: Electronically signed by Jerry De León PT on 6/28/22 at 1:52 PM EDT      If you have any questions or concerns, please don't hesitate to call. Thank you for your referral.    I have reviewed this plan of care and certify a need for medically necessary rehabilitation services.     Physician Signature:__________________________________________________________  Date:  Please sign and return

## 2022-06-28 NOTE — PROGRESS NOTES
[facility]  PHYSICAL THERAPY EVALUATION      Physical Therapy: Initial Evaluation    Patient: Eboni Swenson (85 y.o.     male)   Examination Date:   Plan of Care Certification Period: 2022 to    Progress Note Counter:    :  1985 ;    Confirmed: Yes MRN: [id number]  CSN: [id number]   Insurance: Payor: Malagon Goldberg / Plan: [employer] / Product Type: *No Product type* /   Insurance ID: [id number] - ([facility]) Secondary Insurance (if applicable):    Referring Physician: Ashley Maldonado MD     PCP: Patria Severs, MD Visits to Date/Visits Approved:  (25 visits then needs approval)    No Show/Cancelled Appts: 0      Medical Diagnosis: Unspecified rotator cuff tear or rupture of unspecified shoulder, not specified as traumatic [M75.100] Rotator cuff tear  Treatment Diagnosis: impaired right shoulder ROM, impaired right shoulder strength, right shoulder pain     PERTINENT MEDICAL HISTORY   Patient Assessed for Rehabilitation Services: Yes       Medical History: Chart Reviewed: Yes   Past Medical History:   Diagnosis Date    Asthma     Dermatitis     Fatty liver     Gastroesophageal reflux disease with hiatal hernia     Hypertension     Obesity (BMI 30-39. 9)      Surgical History:   Past Surgical History:   Procedure Laterality Date    LYMPH NODE BIOPSY      surgery due to cat scratch disease age 1   Melissa Chris WISDOM TOOTH EXTRACTION         Medications:   Current Outpatient Medications:     lamoTRIgine (LAMICTAL XR) 200 MG TB24 extended release tablet, TAKE 1 TABLET BY MOUTH EVERY DAY, Disp: 90 tablet, Rfl: 1    naproxen (NAPROSYN) 500 MG tablet, TAKE 1 TABLET BY MOUTH TWICE A DAY AS NEEDED FOR PAIN, Disp: 60 tablet, Rfl: 0    tadalafil (CIALIS) 5 MG tablet, Take 1 tablet by mouth daily, Disp: 90 tablet, Rfl: 1    testosterone cypionate (DEPOTESTOTERONE CYPIONATE) 200 MG/ML injection, Inject 1 mL into the muscle every 14 days for 180 days. , Disp: 6 mL, Rfl: 1    omeprazole (PRILOSEC) 40 MG delayed release capsule, TAKE 1 CAPSULE BY MOUTH EVERY DAY, Disp: 90 capsule, Rfl: 2    metoprolol (LOPRESSOR) 100 MG tablet, TAKE 1 TABLET BY MOUTH TWICE A DAY, Disp: 180 tablet, Rfl: 1    fluticasone-salmeterol (ADVAIR DISKUS) 100-50 MCG/DOSE diskus inhaler, Inhale 1 puff into the lungs every 12 hours, Disp: 180 each, Rfl: 1    albuterol (PROVENTIL) (2.5 MG/3ML) 0.083% nebulizer solution, Take 3 mLs by nebulization every 6 hours as needed for Wheezing, Disp: 120 each, Rfl: 3    albuterol sulfate  (90 Base) MCG/ACT inhaler, TAKE 2 PUFFS BY MOUTH EVERY 6 HOURS AS NEEDED FOR WHEEZE, Disp: 6.7 each, Rfl: 5    hydrOXYzine (VISTARIL) 25 MG capsule, Take 1 capsule by mouth 3 times daily as needed for Anxiety, Disp: 30 capsule, Rfl: 2    EPINEPHrine (EPIPEN 2-AUGUSTUS) 0.3 MG/0.3ML SOAJ injection, Inject 0.3 mLs into the muscle once as needed (allergic reaction/bee sting) Use as directed for allergic reaction Dispense 1 two pack (epi pen), Disp: 1 each, Rfl: 0  Allergies: Amlodipine, Bee venom, Compazine [prochlorperazine maleate], and Lisinopril      SUBJECTIVE EXAMINATION      ,           Subjective History: Onset Date: 01/24/22  Subjective: Patient reports injury to right shoulder after falling down stairs. Reports he had injection which helped pain. Reports he feels his shoulder \"\". Increased pain with lifting and rotation of right shoulder. Reports numbness in right thumb and some tingling in upper right trap. Additional Pertinent Hx (if applicable): HTN   Previous treatments prior to current episode?: Injections  Comment: RTD 7/14/22; MRI:Glenoid labrum superior tear, or motion blurring artifact; Supraspinatus musculotendinous junction and rotator cuff interval very mild strain/sprain;Subscapularis articular surface humeral attachment strain/partial tear without tendon gap;Subacromial-subdeltoid mild bursitis.       Learning/Language: Learning  Does the patient/guardian have any barriers to learning?: No barriers  What is the preferred language of the patient/guardian?: English  How does the patient/guardian prefer to learn new concepts?: Listening,Reading,Demonstration     Pain Screening    Pain Screening  Patient Currently in Pain: Yes  Pain Assessment: 0-10  Best Pain Level: 1  Worst Pain Level: 8  Pain Type: Acute pain,Chronic pain  Pain Location: Shoulder  Pain Orientation: Right  Pain Descriptors: Aching,Sore    Functional Status    Social History:    Social History  Lives With: Spouse  Type of Home: House  Home Layout: Two level    Occupation/Interests:   Occupation: Full time employment  Type of Occupation: [employer]    Prior Level of Function:     Independent        Current Level of Function:          ADL Assistance: Independent  Homemaking Assistance: Independent  Ambulation Assistance: Independent  Transfer Assistance: Independent    Dominant Hand: : Right    OBJECTIVE EXAMINATION     Restrictions:          no restrictions    Review of Systems:  Vision: Within Functional Limits  Hearing: Within functional limits  Follows Commands: Within Functional Limits    Regional Screen:    Cervical Screen: reports some tenderness in upper trap with right side bending  Elbow/Forearm Screen: WFLs    Observations:    increased right shoulder anterior positioning    Palpation:    no tenderness to palpation along right shoulder    Mobility:            Ambulation  Surface: carpet  Device: No Device  Assistance: Independent  Gait Deviations: None  Distance: clinical distance in department      Neuro Screen: Sensation  Overall Sensation Status: Impaired  Additional Comments: reports numbness in right thumb and tingling in upper trap    Left AROM  Right AROM              AROM RUE (degrees)  R Shoulder Flexion 0-180: 123 deg  R Shoulder Extension 0-45: 50 deg  R Shoulder ABduction 0-180: 125 deg  R Shoulder Int Rotation  0-70: 65 deg supine  R Shoulder Ext Rotation 0-90: 60 deg supine          Left Strength  Right Strength         Strength LUE  L Shoulder Flexion: 5/5  L Shoulder Extension: 5/5  L Shoulder ABduction: 5/5  L Shoulder Internal Rotation: 5/5  L Shoulder External Rotation: 5/5    Strength RUE  R Shoulder Flexion: 4-/5  R Shoulder Extension: 4/5  R Shoulder ABduction: 3+/5  R Shoulder Internal Rotation: 4/5  R Shoulder External Rotation: 4/5  R Elbow Flexion: 5/5  R Elbow Extension: 5/5       Outcomes Score: UEFI: 55/80           Treatment:    Exercises:   Exercises  Exercise 1: seated wand exercises flexion, scaption, ER 3s x 5  Exercise 2: scapular retraction 5s x 10  Exercise 3: pulleys*  Exercise 4: wall slides flexion, abduction*  Exercise 5: S/L shoulder abduction, ER*  Exercise 6: supine shoulder flexion (AROM)*  Exercise 7: ball stabs table*  Exercise 8: Tband rows/lats*  Exercise 9: shoulder isometrics*  Exercise 10: standing shoulder flexion AROM*  Exercise 11: progress as tolerates*  Exercise 12: PROM right shoulder (gentle)*  Exercise 20: HEP: seated wand ex flexion, scaption, ER     Modalities:  Modalities: Yes  Cryotherapy (CPT 62971)  Patient Position: Seated  Number Minutes Cryotherapy: 10 minutes with estim  Cryotherapy location: Right,Shoulder  Post treatment skin assessment: Intact  Ultrasound (CPT E6018207)  Ultrasound specified location: 1.2 w/cm2, 3MHz, continous*  Electric stimulation, unattended (CPT 68790) /  (Medicare)  Patient Position: Seated  E-stim location: Right,Shoulder  E-stim specified location: right shoulder for 10 minutes with cold pack  E-stim type: Interferential (IFC)  E-stim via: 4 Electrode Pads     Manual:  Manual Therapy  Soft Tissue Mobilizaton: right shoulder*     *Indicates exercise,modality, or manual techniques to be initiated when appropriate       ASSESSMENT     Impression: Assessment: Patient has injury to right shoulder after falling down the steps.   Reports since injury he gets a separation feeling when lifting his arm. MRI did find mulitple issues including possible glenoid labrum tear. Patient demonstrates limited ROM with pain and decreased strength in right shoulder compared to left. Further PT recommended to improve strength, ROM, and decrease pain for overall quality of life. Body Structures, Functions, Activity Limitations Requiring Skilled Therapeutic Intervention: Decreased ROM,Decreased strength,Decreased endurance,Increased pain    Statement of Medical Necessity: Physical Therapy is both indicated and medically necessary as outlined in the POC to increase the likelihood of meeting the functionally related goals stated below. Patient's Activity Tolerance: Patient tolerated evaluation without incident      Patient's rehabilitation potential/prognosis is considered to be: Good,Fair    Factors which may impact rehabilitation potential include: Pain tolerance/management     Patient Education: PT Yoan Sheets of Care,Home Exercise Program      GOALS   Patient Goal(s): Patient goals : \"strengthening shoulder\"        Long Term Goals Completed by 4 weeks Goal Status   Patient will report </= 1/10 pain in right shoulder with all reaching. New   Patient will increase painfree right shoulder AROM to [facility] for improved functional tolerance. New   Patient will increase strength in right shoulder to 5/5 for improved lifting tolerance. New   UEFI >/= 65/80 to demonstrate functional improvements. New   Patient will be independent with HEP.  New          TREATMENT PLAN            Treatment may include any combination of the following: Strengthening,ROM,Neuromuscular re-education,Manual Therapy - Soft Tissue Mobilization,Pain management,Home exercise program,Modalities,Equipment evaluation, education, & procurement     Frequency / Duration:  Patient to be seen 2 times per week for 4 weeks  Plan Comment:               Eval Complexity:   Decision Making: Medium Complexity  History: Personal Factors and/or Comorbidities Impacting POC: Medium  Examination of body system(s) including body structures and functions, activity limitations, and/or participation restrictions: Medium  Clinical Presentation: Medium  Clinical Decision Making : Medium Complexity    POST-PAIN     Pain Rating (0-10 pain scale):   8/10  Location and pain description same as pre-treatment unless indicated. Action: [] NA  [] Call Physician  [] Perform HEP  [x] Meds as prescribed    Evaluation and patient rights have been reviewed and patient agrees with plan of care. Yes  [x]  No  []   Explain:     [place] Fall Risk Assessment  Risk Factor Scale  Score   History of Falls [] Yes  [x] No 25  0 0   Secondary Diagnosis [] Yes  [x] No 15  0 0   Ambulatory Aid [] Furniture  [] Crutches/cane/walker  [x] None/bedrest/wheelchair/nurse 30  15  0 0   IV/Heparin Lock [] Yes  [x] No 20  0 0   Gait/Transferring [] Impaired  [] Weak  [x] Normal/bedrest/immobile 20  10  0 0   Mental Status [] Forgets limitations  [x] Oriented to own ability 15  0 0      Total:0     Based on the Assessment score: check the appropriate box.   [x]  No intervention needed   Low =   Score of 0-24  []  Use standard prevention interventions Moderate =  Score of 24-44   [] Discuss fall prevention strategies   [] Indicate moderate falls risk on eval  []  Use high risk prevention interventions High = Score of 45 and higher   [] Discuss fall prevention strategies   [] Provide supervision during treatment time      Minutes:  PT Individual Minutes  Time In: 1105  Time Out: 1145  Minutes: 40  Timed Code Treatment Minutes: 5 Minutes  Procedure Minutes: 25 PT evaluation minutes, 10 minutes estim     Timed Activity Minutes Units   Ther Ex 5    Manual          Electronically signed by Felisha Floyd, PT on 6/28/22 at 1:48 PM EDT

## 2022-07-01 ENCOUNTER — HOSPITAL ENCOUNTER (OUTPATIENT)
Dept: PHYSICAL THERAPY | Age: 37
Setting detail: THERAPIES SERIES
Discharge: HOME | End: 2022-07-01
Payer: COMMERCIAL

## 2022-07-07 ENCOUNTER — HOSPITAL ENCOUNTER (OUTPATIENT)
Dept: PHYSICAL THERAPY | Age: 37
Setting detail: THERAPIES SERIES
Discharge: HOME | End: 2022-07-07
Payer: COMMERCIAL

## 2022-07-07 NOTE — PROGRESS NOTES
Therapy                            Cancellation/No-show Note    Date: 2022  Patient: Nidia Rinaldi (1 y.o. male)  : 1985  MRN:  [id number]  Referring Physician: Lucille Vasquez MD     Medical Diagnosis: Unspecified rotator cuff tear or rupture of unspecified shoulder, not specified as traumatic [M75.100]      Visit Information:  Insurance: Payor: Lanny Mcgee / Plan: [place] [employer] / Product Type: *No Product type* /   Visits to Date: 1   No Show/Cancelled Appts:  0      For today's appointment patient:  []  Cancelled  []  Rescheduled appointment  [x]  No-show   []  Called pt to remind of next appointment     Reason given by patient:  []  Patient ill  []  Conflicting appointment  []  No transportation    []  Conflict with work  []  No reason given  []  Other:      [x] Pt has future appointments scheduled, no follow up needed  [] Pt requests to be on hold.     Reason:   If > 2 weeks please discuss with therapist.  [] Therapist to call pt for follow up     Comments:       Signature: Electronically signed by Ildefonso Spears PTA on 22 at 1:56 PM EDT

## 2022-07-12 ENCOUNTER — HOSPITAL ENCOUNTER (OUTPATIENT)
Dept: PHYSICAL THERAPY | Age: 37
Setting detail: THERAPIES SERIES
Discharge: HOME | End: 2022-07-12
Payer: COMMERCIAL

## 2022-07-12 PROCEDURE — 97110 THERAPEUTIC EXERCISES: CPT

## 2022-07-12 PROCEDURE — 97035 APP MDLTY 1+ULTRASOUND EA 15: CPT

## 2022-07-12 ASSESSMENT — PAIN DESCRIPTION - LOCATION: LOCATION: SHOULDER

## 2022-07-12 ASSESSMENT — PAIN DESCRIPTION - ORIENTATION: ORIENTATION: RIGHT

## 2022-07-12 ASSESSMENT — PAIN DESCRIPTION - PAIN TYPE: TYPE: ACUTE PAIN;CHRONIC PAIN

## 2022-07-12 ASSESSMENT — PAIN SCALES - GENERAL: PAINLEVEL_OUTOF10: 4

## 2022-07-12 ASSESSMENT — PAIN DESCRIPTION - DESCRIPTORS: DESCRIPTORS: ACHING;SORE

## 2022-07-12 NOTE — PROGRESS NOTES
[facility]  Outpatient Physical Therapy    Treatment Note        Date: 2022  Patient: Christie Paget  : 1985   Confirmed: Yes  MRN: [id number]  Referring Provider: Zoila Dawson MD   Secondary Referring Provider (If applicable):     Medical Diagnosis: Unspecified rotator cuff tear or rupture of unspecified shoulder, not specified as traumatic [M75.100]    Treatment Diagnosis: impaired right shoulder ROM, impaired right shoulder strength, right shoulder pain    Visit Information:  Insurance: Payor: Kimberley Greene / Plan: Sandra Spore / Product Type: *No Product type* /   PT Visit Information  Onset Date: 22  Total # of Visits Approved: 25 (25 visits then needs approval)  Total # of Visits to Date: 1  No Show: 2  Canceled Appointment: 0  Progress Note Counter:     Subjective Information:  Subjective: Patient reports he thinks the shot is wearing off.   HEP Compliance:  [x] Good [] Fair [] Poor [] Reports not doing due to:    Pain Screening  Patient Currently in Pain: Yes  Pain Assessment: 0-10  Pain Level: 4  Pain Type: Acute pain,Chronic pain  Pain Location: Shoulder  Pain Orientation: Right  Pain Descriptors: Aching,Sore    Treatment:  Exercises:  Exercises  Exercise 1: seated wand exercises flexion, scaption, ER 3s x 10  Exercise 2: scapular retraction 5s x 10  Exercise 3: pulleys x 3 minutes  Exercise 4: wall slides flexion, abduction x 10  Exercise 7: ball stabs at stable x 15 4 way, right  Exercise 9: shoulder isometrics 5s x 10, right (50% max)  Exercise 13: US right shoulder see modalities  Exercise 20: HEP: continue with current       Manual:   Manual Therapy  Soft Tissue Mobilizaton: STM with tennis ball, right upper trap x 5 minutes       Modalities:  Cryotherapy (CPT 21703)  Patient Position: Seated  Number Minutes Cryotherapy: 10 minutes  Cryotherapy location: Right,Shoulder  Post treatment skin assessment: Intact  Ultrasound (CPT C2297271)  Patient Position: Seated  Ultrasound specified location: right shoulder anterior joint line  Ultrasound frequency: 3 MHz  Ultrasound intensity (W/cm2): 1.2  Ultrasound mode: Continuous  Ultrasound Parameters: x 8 minutes       *Indicates exercise, modality, or manual techniques to be initiated when appropriate    Objective Measures:           Strength: [x] NT  [] MMT completed:                   ROM: [x] NT  [] ROM measurements:                Assessment: Body Structures, Functions, Activity Limitations Requiring Skilled Therapeutic Intervention: Decreased ROM,Decreased strength,Decreased endurance,Increased pain  Assessment: Patient reports pain with some ROM exercises and shoulder isometrics. Reports some relief with US and STM of bilateral upper traps. Continue per POC. Treatment Diagnosis: impaired right shoulder ROM, impaired right shoulder strength, right shoulder pain  Therapy Prognosis: Good,Fair          Post-Pain Assessment:       Pain Rating (0-10 pain scale):   Does not state/10   Location and pain description same as pre-treatment unless indicated. Action: [] NA   [] Perform HEP  [x] Meds as prescribed  [x] Modalities as prescribed   [] Call Physician     GOALS   Patient Goal(s): Patient goals : \"strengthening shoulder\"        Long Term Goals Completed by 4 weeks Goal Status   LTG 1 Patient will report </= 1/10 pain in right shoulder with all reaching. New   LTG 2 Patient will increase painfree right shoulder AROM to [facility] for improved functional tolerance. New   LTG 3 Patient will increase strength in right shoulder to 5/5 for improved lifting tolerance. New   LTG 4 UEFI >/= 65/80 to demonstrate functional improvements. New   LTG 5 Patient will be independent with HEP.  New     Plan:  Frequency/Duration:  Plan  Plan Frequency: 2  Plan weeks: 4  Current Treatment Recommendations: Strengthening,ROM,Neuromuscular re-education,Manual Therapy - Soft Tissue Mobilization,Pain management,Home exercise program,Modalities,Equipment evaluation, education, & procurement  Pt to continue current HEP. See objective section for any therapeutic exercise changes, additions or modifications this date.     Therapy Time:      PT Individual Minutes  Time In: 7426  Time Out: 5063  Minutes: 48  Timed Code Treatment Minutes: 38 Minutes  Procedure Minutes:10 minutes cold pack  Timed Activity Minutes Units   Ther Ex 24 2   Manual  6    Ultrasound 8 1     Electronically signed by Evan Marina, PT on 7/12/22 at 4:01 PM EDT

## 2022-07-14 ENCOUNTER — HOSPITAL ENCOUNTER (OUTPATIENT)
Dept: PHYSICAL THERAPY | Age: 37
Setting detail: THERAPIES SERIES
Discharge: HOME | End: 2022-07-14
Payer: COMMERCIAL

## 2022-07-14 NOTE — PROGRESS NOTES
Therapy                            Cancellation/No-show Note    Date: 2022  Patient: Stacy Duval (53 y.o. male)  : 1985  MRN:  [id number]  Referring Physician: Elfego Martinez MD     Medical Diagnosis: Unspecified rotator cuff tear or rupture of unspecified shoulder, not specified as traumatic [M75.100]      Visit Information:  Insurance: Payor: Vaishali Alanis / Plan: [address] [place] [address] / Product Type: *No Product type* /   Visits to Date: 2   No Show/Cancelled Appts:       For today's appointment patient:  [x]  Cancelled  []  Rescheduled appointment  []  No-show   []  Called pt to remind of next appointment     Reason given by patient:  []  Patient ill  []  Conflicting appointment  []  No transportation    [x]  Conflict with work  []  No reason given  []  Other:      [x] Pt has future appointments scheduled, no follow up needed  [] Pt requests to be on hold.     Reason:   If > 2 weeks please discuss with therapist.  [] Therapist to call pt for follow up     Comments:       Signature: Electronically signed by Reji Cleaning PT on 22 at 2:00 PM EDT

## 2022-07-19 ENCOUNTER — HOSPITAL ENCOUNTER (OUTPATIENT)
Dept: PHYSICAL THERAPY | Age: 37
Setting detail: THERAPIES SERIES
Discharge: HOME | End: 2022-07-19
Payer: COMMERCIAL

## 2022-07-22 ENCOUNTER — HOSPITAL ENCOUNTER (OUTPATIENT)
Dept: PHYSICAL THERAPY | Age: 37
Setting detail: THERAPIES SERIES
Discharge: HOME | End: 2022-07-22
Payer: COMMERCIAL

## 2022-07-22 NOTE — PROGRESS NOTES
Therapy                            Cancellation/No-show Note    Date: 2022  Patient: Adina Saavedra (51 y.o. male)  : 1985  MRN:  [id number]  Referring Physician: Cash Sierra MD     Medical Diagnosis: Unspecified rotator cuff tear or rupture of unspecified shoulder, not specified as traumatic [M75.100] Rotator cuff tear    Visit Information:  Insurance: Payor: Gerda Aldrich / Plan: [address] [place] [address] / Product Type: *No Product type* /   Visits to Date: 2   No Show/Cancelled Appts:       For today's appointment patient:  []  Cancelled  []  Rescheduled appointment  [x]  No-show   []  Called pt to remind of next appointment     Reason given by patient:  []  Patient ill  []  Conflicting appointment  []  No transportation    []  Conflict with work  []  No reason given  []  Other:      [] Pt has future appointments scheduled, no follow up needed  [] Pt requests to be on hold.     Reason:   If > 2 weeks please discuss with therapist.  [] Therapist to call pt for follow up     Comments:       Signature: Electronically signed by Romana Day, PT on 22 at 2:41 PM EDT

## 2022-07-29 ENCOUNTER — APPOINTMENT (OUTPATIENT)
Dept: PHYSICAL THERAPY | Age: 37
End: 2022-07-29
Payer: COMMERCIAL

## 2022-08-16 ENCOUNTER — CLINICAL DOCUMENTATION (OUTPATIENT)
Dept: PHYSICAL THERAPY | Age: 37
End: 2022-08-16

## 2022-08-16 NOTE — DISCHARGE SUMMARY
[address]     Ph: [phone]  Fax: [phone]      [] Certification  [] Recertification []  Plan of Care  [] Progress Note [x] Discharge      Referring Provider: Galen Vaughn      From:  Abdifatah Arguello, PT   Patient: Frandy Jiménez (74 y.o. male) : 1985 Date: 2022   Medical Diagnosis: No admission diagnoses are documented for this encounter. Rotator cuff tear  Treatment Diagnosis: impaired right shoulder ROM, impaired right shoulder strength, right shoulder pain    Plan of Care/Certification Expiration Date: :     Progress Report Period from:  2022  to 2022    Visits to Date: 2 No Show: 4 Cancelled Appts: 1    OBJECTIVE:   Short Term Goals =Long term goals      Long Term Goals - Time Frame for Long term goals : 4 weeks  Goals Current/ Discharge status Status   Long term goal 1: Patient will report </= 1/10 pain in right shoulder with all reaching. NT due to unexpected D/C  Not met   Long term goal 2: Patient will increase painfree right shoulder AROM to [facility] for improved functional tolerance. NT due to unexpected D/C  Not met   Long term goal 3: Patient will increase strength in right shoulder to 5/5 for improved lifting tolerance. NT due to unexpected D/C  Not met   Long term goal 4: UEFI >/= 65/80 to demonstrate functional improvements. NT due to unexpected D/C  Not met   Long term goal 5: Patient will be independent with HEP. NT due to unexpected D/C  Not met       Assessment: Patient will be discharged at this time. Due to multiple no shows and cancels, patient will be discharged. PLAN:   Frequency/Duration:  Plan Comment: D/C from PT     Precautions:                            Patient Status:[] Continue/ Initiate plan of Care    [x] Discharge PT. Recommend pt continue with HEP.      [] Additional visits requested, Please re-certify for additional visits:    [] Hold         Signature: Electronically signed by Jeana Thompson PT on 8/16/22 at 1:40 PM EDT      If you have any questions or concerns, please don't hesitate to call. Thank you for your referral.    I have reviewed this plan of care and certify a need for medically necessary rehabilitation services.     Physician Signature:__________________________________________________________  Date:  Please sign and return

## 2022-10-18 ENCOUNTER — TELEMEDICINE (OUTPATIENT)
Dept: FAMILY MEDICINE CLINIC | Age: 37
End: 2022-10-18
Payer: COMMERCIAL

## 2022-10-18 DIAGNOSIS — J06.9 URI WITH COUGH AND CONGESTION: ICD-10-CM

## 2022-10-18 DIAGNOSIS — J01.40 ACUTE NON-RECURRENT PANSINUSITIS: Primary | ICD-10-CM

## 2022-10-18 LAB
Lab: NORMAL
PERFORMING INSTRUMENT: NORMAL
QC PASS/FAIL: NORMAL
SARS-COV-2, POC: NORMAL

## 2022-10-18 PROCEDURE — 99442 PR PHYS/QHP TELEPHONE EVALUATION 11-20 MIN: CPT | Performed by: NURSE PRACTITIONER

## 2022-10-18 PROCEDURE — 87426 SARSCOV CORONAVIRUS AG IA: CPT | Performed by: NURSE PRACTITIONER

## 2022-10-18 RX ORDER — AMOXICILLIN 875 MG/1
875 TABLET, COATED ORAL 2 TIMES DAILY
Qty: 20 TABLET | Refills: 0 | Status: SHIPPED | OUTPATIENT
Start: 2022-10-18 | End: 2022-10-28

## 2022-10-18 ASSESSMENT — ENCOUNTER SYMPTOMS
NAUSEA: 0
CHEST TIGHTNESS: 0
COUGH: 1
RHINORRHEA: 1
SORE THROAT: 0
DIARRHEA: 0
SINUS PRESSURE: 1
ABDOMINAL PAIN: 0
SHORTNESS OF BREATH: 0

## 2022-10-18 NOTE — PROGRESS NOTES
TELEHEALTH EVALUATION -- Audio/Visual (During RWMNX-01 public health emergency)    -   Jany Junior is a 42 y.o. male being evaluated by a Virtual Visit (video visit) encounter to address concerns as mentioned above. A caregiver was present when appropriate. Due to this being a TeleHealth encounter (During NWQPA-51 public health emergency), evaluation of the following organ systems was limited: Vitals/Constitutional/EENT/Resp/CV/GI//MS/Neuro/Skin/Heme-Lymph-Imm. Pursuant to the emergency declaration under the [facility], [address] [place] [address] authority and the [employer] Act, this Virtual Visit was conducted with patient's (and/or legal guardian's) consent, to reduce the patient's risk of exposure to COVID-19 and provide necessary medical care. The patient (and/or legal guardian) has also been advised to contact this office for worsening conditions or problems, and seek emergency medical treatment and/or call 911 if deemed necessary. Patient was contacted and agreed to proceed with a virtual visit via Telephone Visit  The risks and benefits of converting to a virtual visit were discussed in light of the current infectious disease epidemic. Patient also understood that insurance coverage and co-pays are up to their individual insurance plans. Patient was located at their home. Provider was located at their office.      10/18/2022  Jany Junior (:  1985) has requested an audio/video evaluation for the following concern(s):    HPI  VV audio for sinus pain/pressure   Symptoms started Friday   His children sick prior to him feeling unwell   States symptoms worsening over the last couple of days   Tmax 99F  Phlegm clear to yellow  Nasal congestion, cough and PND  Denies chest tightness or SOB  Denies chest pain   Afebrile  Denies GI abnormalities   Eating and drinking   Sleep uninterrupted   Dayquil, alkaseltzer plus and flonase                 Review of Systems   Constitutional:  Positive for chills and diaphoresis. Negative for activity change, appetite change, fatigue and fever. HENT:  Positive for congestion, ear pain, postnasal drip, rhinorrhea and sinus pressure. Negative for sore throat. Respiratory:  Positive for cough. Negative for chest tightness and shortness of breath. Cardiovascular:  Negative for chest pain and palpitations. Gastrointestinal:  Negative for abdominal pain, diarrhea and nausea. Musculoskeletal:  Negative for arthralgias and myalgias. Neurological:  Negative for dizziness, light-headedness and headaches. Psychiatric/Behavioral:  Negative for sleep disturbance. Prior to Visit Medications    Medication Sig Taking? Authorizing Provider   amoxicillin (AMOXIL) 875 MG tablet Take 1 tablet by mouth 2 times daily for 10 days Yes ELPIDIO Morgan NP   lamoTRIgine (LAMICTAL XR) 200 MG TB24 extended release tablet TAKE 1 TABLET BY MOUTH EVERY DAY  Fatou Ramirez MD   naproxen (NAPROSYN) 500 MG tablet TAKE 1 TABLET BY MOUTH TWICE A DAY AS NEEDED FOR PAIN  Fatou Ramirez MD   tadalafil (CIALIS) 5 MG tablet Take 1 tablet by mouth daily  Fatou Ramirez MD   testosterone cypionate (DEPOTESTOTERONE CYPIONATE) 200 MG/ML injection Inject 1 mL into the muscle every 14 days for 180 days.   Fatou Ramirez MD   omeprazole (PRILOSEC) 40 MG delayed release capsule TAKE 1 CAPSULE BY MOUTH EVERY DAY  Fatou Ramirez MD   metoprolol (LOPRESSOR) 100 MG tablet TAKE 1 TABLET BY MOUTH TWICE A DAY  Fatou Ramirez MD   fluticasone-salmeterol (ADVAIR DISKUS) 100-50 MCG/DOSE diskus inhaler Inhale 1 puff into the lungs every 12 hours  Fatou Ramirez MD   albuterol (PROVENTIL) (2.5 MG/3ML) 0.083% nebulizer solution Take 3 mLs by nebulization every 6 hours as needed for Wheezing  Fatou Ramirez MD   albuterol sulfate  (90 Base) MCG/ACT inhaler TAKE 2 PUFFS BY MOUTH EVERY 6 HOURS AS NEEDED FOR WHEEZE  Vu Costello MD   hydrOXYzine (VISTARIL) 25 MG capsule Take 1 capsule by mouth 3 times daily as needed for Anxiety  Vu Costello MD   EPINEPHrine (EPIPEN 2-AUGUSTUS) 0.3 MG/0.3ML SOAJ injection Inject 0.3 mLs into the muscle once as needed (allergic reaction/bee sting) Use as directed for allergic reaction  Dispense 1 two pack (epi pen)  [address] [facility], DO       Past Medical History:   Diagnosis Date    Asthma     Dermatitis     Fatty liver     Gastroesophageal reflux disease with hiatal hernia     Hypertension     Obesity (BMI 30-39. 9)      Past Surgical History:   Procedure Laterality Date    LYMPH NODE BIOPSY      surgery due to cat scratch disease age 1    WISDOM TOOTH EXTRACTION       Social History     Socioeconomic History    Marital status:      Spouse name: Not on file    Number of children: 1    Years of education: Not on file    Highest education level: Not on file   Occupational History    Not on file   Tobacco Use    Smoking status: Never    Smokeless tobacco: Never   Substance and Sexual Activity    Alcohol use: No     Alcohol/week: 0.0 standard drinks    Drug use: No    Sexual activity: Not on file   Other Topics Concern    Not on file   Social History Narrative    Not on file     Social Determinants of Health     Financial Resource Strain: Not on file   Food Insecurity: Not on file   Transportation Needs: Not on file   Physical Activity: Not on file   Stress: Not on file   Social Connections: Not on file   Intimate Partner Violence: Not on file   Housing Stability: Not on file     No family history on file. Allergies   Allergen Reactions    Amlodipine Swelling    Bee Venom     Compazine [Prochlorperazine Maleate] Swelling    Lisinopril Hives and Swelling     Skin tightens on his face and gets hives       PMH, Surgical Hx, Family Hx, and Social Hx reviewed and updated.           PHYSICAL EXAMINATION:  Oriented and conversant  No audible distress  No cough throughout visit             Other pertinent observable physical exam findings-   Results for orders placed or performed in visit on 10/18/22   POCT COVID-19, Antigen   Result Value Ref Range    SARS-COV-2, POC Not-Detected Not Detected    Lot Number [id number]     QC Pass/Fail p     Performing Instrument BD Veritor        ASSESSMENT/PLAN:  Assessment & Plan   Angle Bhatti was seen today for uri and sinus problem. Diagnoses and all orders for this visit:    Acute non-recurrent pansinusitis  -     amoxicillin (AMOXIL) 875 MG tablet; Take 1 tablet by mouth 2 times daily for 10 days    URI with cough and congestion  -     POCT COVID-19, Antigen  -     amoxicillin (AMOXIL) 875 MG tablet; Take 1 tablet by mouth 2 times daily for 10 days    Orders Placed This Encounter   Procedures    POCT COVID-19, Antigen     Order Specific Question:   Is this test for diagnosis or screening? Answer:   Diagnosis of ill patient     Order Specific Question:   Symptomatic for COVID-19 as defined by CDC? Answer:   Yes     Order Specific Question:   Date of Symptom Onset     Answer:   10/14/2022     Order Specific Question:   Hospitalized for COVID-19? Answer:   No     Order Specific Question:   Admitted to ICU for COVID-19? Answer:   No     Order Specific Question:   Employed in healthcare setting? Answer:   No     Order Specific Question:   Resident in a congregate (group) care setting? Answer:   No     Order Specific Question:   Pregnant: Answer:   No     Order Specific Question:   Previously tested for COVID-19? Answer:   Yes       Orders Placed This Encounter   Medications    amoxicillin (AMOXIL) 875 MG tablet     Sig: Take 1 tablet by mouth 2 times daily for 10 days     Dispense:  20 tablet     Refill:  0       There are no discontinued medications. Return if symptoms worsen or fail to improve, for follow up with PCP. Reviewed with the patient: current clinical status & medications.  Side effects, adverse effects of the medication prescribed today, as well as treatment plan/rationale and result expectations have been discussed with the patient who expressed understanding. Close follow up to evaluate treatment results and for coordination of care. I have reviewed the patient's medical history in detail and updated the computerized patient record. Patient identification was verified at the start of the visit: Yes  Total time spent on this encounter: 20 minutes  >50% of 20 minutes was spent spent on counseling, answering questions, instructions on meds & testing & coordinating the care based on my plan and assessment as noted. --ELPIDIO Fermin NP on 10/20/2022 at 12:45 PM    An electronic signature was used to authenticate this note.

## 2022-11-08 DIAGNOSIS — S43.401A SPRAIN OF RIGHT SHOULDER, UNSPECIFIED SHOULDER SPRAIN TYPE, INITIAL ENCOUNTER: ICD-10-CM

## 2022-11-08 RX ORDER — NAPROXEN 500 MG/1
TABLET ORAL
Qty: 60 TABLET | Refills: 0 | Status: SHIPPED | OUTPATIENT
Start: 2022-11-08

## 2022-11-08 NOTE — TELEPHONE ENCOUNTER
Future Appointments    This patient does not currently have any appointments scheduled.   Past Visits    Date Provider Specialty Visit Type Primary Dx   10/18/2022 ELPIDIO Parsons - NP Family Medicine Telemedicine Acute non-recurrent pansinusitis   09/16/2022 Alberto Oneal MD IP Unit Surgery    05/05/2022 Isis Lemons MD Family Medicine Office Visit Folliculitis   04/85/3094 Shelton Dixon MD Family Medicine Office Visit Sprain of right shoulder, unspecified shoulder sprain type, initial encounter   01/17/2022 Shelton Dixon MD Family Medicine Office Visit Anxiety and depression

## 2022-11-16 ENCOUNTER — OFFICE VISIT (OUTPATIENT)
Dept: FAMILY MEDICINE CLINIC | Age: 37
End: 2022-11-16
Payer: COMMERCIAL

## 2022-11-16 VITALS
OXYGEN SATURATION: 98 % | DIASTOLIC BLOOD PRESSURE: 76 MMHG | HEART RATE: 68 BPM | TEMPERATURE: 96.9 F | HEIGHT: 69 IN | SYSTOLIC BLOOD PRESSURE: 134 MMHG | WEIGHT: 225 LBS | BODY MASS INDEX: 33.33 KG/M2

## 2022-11-16 DIAGNOSIS — R30.0 DYSURIA: ICD-10-CM

## 2022-11-16 DIAGNOSIS — N30.00 ACUTE CYSTITIS WITHOUT HEMATURIA: Primary | ICD-10-CM

## 2022-11-16 LAB
BILIRUBIN, POC: NORMAL
BLOOD URINE, POC: NORMAL
CLARITY, POC: NORMAL
COLOR, POC: NORMAL
GLUCOSE URINE, POC: NORMAL
KETONES, POC: NORMAL
LEUKOCYTE EST, POC: NORMAL
NITRITE, POC: NORMAL
PH, POC: 6
PROTEIN, POC: NORMAL
SPECIFIC GRAVITY, POC: 1.02
UROBILINOGEN, POC: 3.5

## 2022-11-16 PROCEDURE — 81002 URINALYSIS NONAUTO W/O SCOPE: CPT | Performed by: PHYSICIAN ASSISTANT

## 2022-11-16 PROCEDURE — 3074F SYST BP LT 130 MM HG: CPT | Performed by: PHYSICIAN ASSISTANT

## 2022-11-16 PROCEDURE — 99213 OFFICE O/P EST LOW 20 MIN: CPT | Performed by: PHYSICIAN ASSISTANT

## 2022-11-16 PROCEDURE — 3078F DIAST BP <80 MM HG: CPT | Performed by: PHYSICIAN ASSISTANT

## 2022-11-16 RX ORDER — CIPROFLOXACIN 500 MG/1
500 TABLET, FILM COATED ORAL 2 TIMES DAILY
Qty: 20 TABLET | Refills: 0 | Status: SHIPPED | OUTPATIENT
Start: 2022-11-16 | End: 2022-11-26

## 2022-11-16 RX ORDER — GREEN TEA/HOODIA GORDONII 315-12.5MG
1 CAPSULE ORAL DAILY
Qty: 30 TABLET | Refills: 1 | Status: SHIPPED | OUTPATIENT
Start: 2022-11-16 | End: 2022-11-30

## 2022-11-16 SDOH — ECONOMIC STABILITY: FOOD INSECURITY: WITHIN THE PAST 12 MONTHS, THE FOOD YOU BOUGHT JUST DIDN'T LAST AND YOU DIDN'T HAVE MONEY TO GET MORE.: NEVER TRUE

## 2022-11-16 SDOH — ECONOMIC STABILITY: FOOD INSECURITY: WITHIN THE PAST 12 MONTHS, YOU WORRIED THAT YOUR FOOD WOULD RUN OUT BEFORE YOU GOT MONEY TO BUY MORE.: NEVER TRUE

## 2022-11-16 ASSESSMENT — SOCIAL DETERMINANTS OF HEALTH (SDOH): HOW HARD IS IT FOR YOU TO PAY FOR THE VERY BASICS LIKE FOOD, HOUSING, MEDICAL CARE, AND HEATING?: NOT HARD AT ALL

## 2022-11-16 ASSESSMENT — ENCOUNTER SYMPTOMS
EYES NEGATIVE: 1
RESPIRATORY NEGATIVE: 1
GASTROINTESTINAL NEGATIVE: 1

## 2022-11-16 NOTE — PROGRESS NOTES
daily 30 tablet 1    naproxen (NAPROSYN) 500 MG tablet TAKE 1 TABLET BY MOUTH TWICE A DAY AS NEEDED FOR PAIN 60 tablet 0    lamoTRIgine (LAMICTAL XR) 200 MG TB24 extended release tablet TAKE 1 TABLET BY MOUTH EVERY DAY 90 tablet 1    tadalafil (CIALIS) 5 MG tablet Take 1 tablet by mouth daily 90 tablet 1    omeprazole (PRILOSEC) 40 MG delayed release capsule TAKE 1 CAPSULE BY MOUTH EVERY DAY 90 capsule 2    metoprolol (LOPRESSOR) 100 MG tablet TAKE 1 TABLET BY MOUTH TWICE A  tablet 1    fluticasone-salmeterol (ADVAIR DISKUS) 100-50 MCG/DOSE diskus inhaler Inhale 1 puff into the lungs every 12 hours 180 each 1    albuterol (PROVENTIL) (2.5 MG/3ML) 0.083% nebulizer solution Take 3 mLs by nebulization every 6 hours as needed for Wheezing 120 each 3    albuterol sulfate  (90 Base) MCG/ACT inhaler TAKE 2 PUFFS BY MOUTH EVERY 6 HOURS AS NEEDED FOR WHEEZE 6.7 each 5    hydrOXYzine (VISTARIL) 25 MG capsule Take 1 capsule by mouth 3 times daily as needed for Anxiety 30 capsule 2    testosterone cypionate (DEPOTESTOTERONE CYPIONATE) 200 MG/ML injection Inject 1 mL into the muscle every 14 days for 180 days. 6 mL 1    EPINEPHrine (EPIPEN 2-AUGUSTUS) 0.3 MG/0.3ML SOAJ injection Inject 0.3 mLs into the muscle once as needed (allergic reaction/bee sting) Use as directed for allergic reaction  Dispense 1 two pack (epi pen) 1 each 0     No current facility-administered medications for this visit. ALLERGIES     Amlodipine, Bee venom, Compazine [prochlorperazine maleate], and Lisinopril    FAMILY HISTORY     History reviewed. No pertinent family history.        SOCIAL HISTORY       Social History     Socioeconomic History    Marital status:      Spouse name: None    Number of children: 1    Years of education: None    Highest education level: None   Tobacco Use    Smoking status: Never    Smokeless tobacco: Never   Substance and Sexual Activity    Alcohol use: No     Alcohol/week: 0.0 standard drinks    Drug use: No     Social Determinants of Health     Financial Resource Strain: Low Risk     Difficulty of Paying Living Expenses: Not hard at all   Food Insecurity: No Food Insecurity    Worried About [address] [employer] in the Last Year: Never true    Ran Out of Food in the Last Year: Never true   Transportation Needs: No Transportation Needs    Lack of Transportation (Medical): No    Lack of Transportation (Non-Medical): No         PHYSICAL EXAM    (up to 7 for level 4, 8 or more for level 5)       Physical Exam  Constitutional:       General: He is not in acute distress. Appearance: He is well-developed. HENT:      Head: Normocephalic and atraumatic. Eyes:      Conjunctiva/sclera: Conjunctivae normal.      Pupils: Pupils are equal, round, and reactive to light. Cardiovascular:      Rate and Rhythm: Normal rate and regular rhythm. Heart sounds: No murmur heard. Pulmonary:      Effort: No respiratory distress. Breath sounds: Normal breath sounds. No wheezing or rales. Abdominal:      General: There is no distension. Palpations: Abdomen is soft. Tenderness: There is no abdominal tenderness. Musculoskeletal:         General: Normal range of motion. Cervical back: Normal range of motion and neck supple. Comments: No CVA tenderness found bilaterally on physical exam   Skin:     General: Skin is warm and dry. Findings: No erythema or rash. Neurological:      Mental Status: He is alert and oriented to person, place, and time. Cranial Nerves: No cranial nerve deficit. Psychiatric:         Judgment: Judgment normal.         All other labs were within normal range or not returned as of this dictation. Vitals:    Vitals:    11/16/22 1624   BP: 134/76   Pulse: 68   Temp: 96.9 °F (36.1 °C)   SpO2: 98%   Weight: 225 lb (102.1 kg)   Height: 5' 9\" (1.753 m)       Initial UA reveals leukocytes and possible trace blood visually that is not picked up on the machine.   Urine culture is pending. Patient will be started on Cipro and we will follow-up with the culture in the next few days. Patient is to notify his urologist and schedule a follow-up appointment. Patient to seek emergency room assessment immediately if back pain worsens or new concerning symptoms arise. He verbalizes understanding of plan at discharge and has no further questions. He is advised to limit caffeine and increase water intake. DISPOSITION/PLAN   1. Acute cystitis without hematuria      2. Dysuria    - POCT Urinalysis no Micro  - Culture, Urine; Future  - C.trachomatis N.gonorrhoeae DNA, Urine;  Future

## 2022-11-17 DIAGNOSIS — R30.0 DYSURIA: ICD-10-CM

## 2022-11-19 LAB — URINE CULTURE, ROUTINE: NORMAL

## 2022-11-24 LAB
C. TRACHOMATIS DNA ,URINE: NEGATIVE
N. GONORRHOEAE DNA, URINE: NEGATIVE

## 2022-11-30 ENCOUNTER — HOSPITAL ENCOUNTER (OUTPATIENT)
Dept: PREADMISSION TESTING | Age: 37
Discharge: HOME | End: 2022-12-04
Payer: COMMERCIAL

## 2022-11-30 VITALS
OXYGEN SATURATION: 98 % | HEART RATE: 71 BPM | TEMPERATURE: 98.2 F | WEIGHT: 220 LBS | RESPIRATION RATE: 16 BRPM | HEIGHT: 68 IN | BODY MASS INDEX: 33.34 KG/M2 | SYSTOLIC BLOOD PRESSURE: 132 MMHG | DIASTOLIC BLOOD PRESSURE: 74 MMHG

## 2022-11-30 DIAGNOSIS — R06.09 DYSPNEA ON EXERTION: ICD-10-CM

## 2022-11-30 DIAGNOSIS — J40 BRONCHITIS: ICD-10-CM

## 2022-11-30 DIAGNOSIS — R05.9 COUGH: ICD-10-CM

## 2022-11-30 DIAGNOSIS — J45.901 MILD ASTHMA WITH EXACERBATION, UNSPECIFIED WHETHER PERSISTENT: ICD-10-CM

## 2022-11-30 LAB
ALBUMIN SERPL-MCNC: 4.6 G/DL (ref 3.5–4.6)
ALP BLD-CCNC: 52 U/L (ref 35–104)
ALT SERPL-CCNC: 34 U/L (ref 0–41)
ANION GAP SERPL CALCULATED.3IONS-SCNC: 10 MEQ/L (ref 9–15)
APTT: 32.8 SEC (ref 24.4–36.8)
AST SERPL-CCNC: 19 U/L (ref 0–40)
BASOPHILS ABSOLUTE: 0.1 K/UL (ref 0–0.2)
BASOPHILS RELATIVE PERCENT: 0.8 %
BILIRUB SERPL-MCNC: 0.8 MG/DL (ref 0.2–0.7)
BILIRUBIN URINE: NEGATIVE
BLOOD, URINE: NEGATIVE
BUN BLDV-MCNC: 12 MG/DL (ref 6–20)
CALCIUM SERPL-MCNC: 9.3 MG/DL (ref 8.5–9.9)
CHLORIDE BLD-SCNC: 103 MEQ/L (ref 95–107)
CLARITY: CLEAR
CO2: 27 MEQ/L (ref 20–31)
COLOR: YELLOW
CREAT SERPL-MCNC: 0.76 MG/DL (ref 0.7–1.2)
EOSINOPHILS ABSOLUTE: 0.3 K/UL (ref 0–0.7)
EOSINOPHILS RELATIVE PERCENT: 4.4 %
GFR SERPL CREATININE-BSD FRML MDRD: >60 ML/MIN/{1.73_M2}
GLOBULIN: 2 G/DL (ref 2.3–3.5)
GLUCOSE BLD-MCNC: 98 MG/DL (ref 70–99)
GLUCOSE URINE: NEGATIVE MG/DL
HCT VFR BLD CALC: 46.6 % (ref 42–52)
HEMOGLOBIN: 15.7 G/DL (ref 14–18)
INR BLD: 1
KETONES, URINE: NEGATIVE MG/DL
LEUKOCYTE ESTERASE, URINE: NEGATIVE
LYMPHOCYTES ABSOLUTE: 1.9 K/UL (ref 1–4.8)
LYMPHOCYTES RELATIVE PERCENT: 26.7 %
MCH RBC QN AUTO: 30.3 PG (ref 27–31.3)
MCHC RBC AUTO-ENTMCNC: 33.7 % (ref 33–37)
MCV RBC AUTO: 89.9 FL (ref 79–92.2)
MONOCYTES ABSOLUTE: 0.7 K/UL (ref 0.2–0.8)
MONOCYTES RELATIVE PERCENT: 9.6 %
NEUTROPHILS ABSOLUTE: 4.2 K/UL (ref 1.4–6.5)
NEUTROPHILS RELATIVE PERCENT: 58.5 %
NITRITE, URINE: NEGATIVE
PDW BLD-RTO: 13.5 % (ref 11.5–14.5)
PH UA: 6.5 (ref 5–9)
PLATELET # BLD: 298 K/UL (ref 130–400)
POTASSIUM SERPL-SCNC: 3.9 MEQ/L (ref 3.4–4.9)
PROTEIN UA: NEGATIVE MG/DL
PROTHROMBIN TIME: 13.5 SEC (ref 12.3–14.9)
RBC # BLD: 5.19 M/UL (ref 4.7–6.1)
SODIUM BLD-SCNC: 140 MEQ/L (ref 135–144)
SPECIFIC GRAVITY UA: 1.02 (ref 1–1.03)
TOTAL PROTEIN: 6.6 G/DL (ref 6.3–8)
UROBILINOGEN, URINE: 0.2 E.U./DL
WBC # BLD: 7.1 K/UL (ref 4.8–10.8)

## 2022-11-30 PROCEDURE — 81003 URINALYSIS AUTO W/O SCOPE: CPT

## 2022-11-30 PROCEDURE — 80053 COMPREHEN METABOLIC PANEL: CPT

## 2022-11-30 PROCEDURE — 85610 PROTHROMBIN TIME: CPT

## 2022-11-30 PROCEDURE — 85730 THROMBOPLASTIN TIME PARTIAL: CPT

## 2022-11-30 PROCEDURE — 85025 COMPLETE CBC W/AUTO DIFF WBC: CPT

## 2022-11-30 RX ORDER — HYDROCODONE BITARTRATE AND ACETAMINOPHEN 5; 325 MG/1; MG/1
TABLET ORAL
COMMUNITY
Start: 2022-09-27

## 2022-12-01 RX ORDER — ALBUTEROL SULFATE 90 UG/1
AEROSOL, METERED RESPIRATORY (INHALATION)
Qty: 6.7 EACH | Refills: 5 | Status: ON HOLD | OUTPATIENT
Start: 2022-12-01

## 2022-12-01 RX ORDER — HYDROXYZINE PAMOATE 25 MG/1
25 CAPSULE ORAL 3 TIMES DAILY PRN
Qty: 30 CAPSULE | Refills: 2 | Status: ON HOLD | OUTPATIENT
Start: 2022-12-01

## 2022-12-01 NOTE — DISCHARGE INSTRUCTIONS
Medication given may have significant effects after discharge. Therefore on the day of surgery:  1) you must be accompanied by a responsible adult upon discharge and for 24 hours after surgery. Do not drive a motor vehicle, operate machinery, power tools or appliance, drink alcoholic beverages, or make critical decisions for 24 hours  2) Be aware of dizziness, which may cause a fall. Change positions slowly. 3) Eating: you may resume your regular diet but it is better to increase intake slowly with mild foods and working up to your regular diet. No greasy, fried or spicy foods today. 4) Nausea/Vomiting: Nausea and vomiting may occur as you become more active or begin to increase food intake. If this should happen, decrease activity and return to liquids. If the problem persists, call your surgeon  5) Pain: Your surgeon may have given you a prescription for pain medication. Take pain medication with food as prescribed. Pain medication may cause constipation, so drink plenty of fluids. If your pain medication does not provide adequate relief, call your surgeon  6) Urinating: Notify your surgeon if you have not urinated within 12 hours after discharge  7) Ice: Apply ice to operative site for 20 min 5-6 times a day or use Polar care as instructed  8) Dressing:   []   Remove dressing in 24hr    []  Remove dressing in 48hr   []  Leave open to air after initial dressing is taken off and incision is dry  Do not remove the steri-strips. (no bath/ hot tubs/ pools)   []  Leave dressing in place.  Keep dressing/ incision clean and dry    9) Activity    Shoulder/ elbow/Hand   []  Elevate extremity    []  Sling   [] at all times (except for exercises and showering)  [] as needed only for comfort   [] Begin daily motion exercises out of sling as instructed   []  Bend and flex fingers/ wrist/elbow frequently   [] other   Knee/ Ankle/ Foot   [] elevate extremity   [] crutches        [] non-weight bearing to operative extremity [] partial weight bearing  [] Full weight bearing as tolerated   []  Use brace whenever walking   [] other      10) Begin physical therapy if advised by you physician:   [] before returning to see you doctor   [] not until you follow up with your doctor    11) call your doctor at [phone] for an appointment (or follow up as scheduled)    [facility] if  Increased redness, swelling, drainage of any kind, and/or pain to surgery site. As well as new onset fevers and or chills. These could signify an infection. Calf or thigh tenderness to touch as well as increased swelling or redness. This could signify a clot formation. Numbness or tingling to an area around the incision site or below the incision site (toes). Or if the operative extremity becomes cold, blue. Any rash appears, increased  or new onset nausea/vomiting occur. This may indicate a reaction to a medication. Temp is 38.5 C (101F)  12) If you have any concerns or questions, please call [facility] surgeon on call.  The 24- hour phone is [phone]) If you are unable to contact your surgeon, in an emergency situation, go to the nearest hospital

## 2022-12-02 ENCOUNTER — HOSPITAL ENCOUNTER (OUTPATIENT)
Age: 37
Setting detail: OUTPATIENT SURGERY
Discharge: HOME | End: 2022-12-02
Attending: ORTHOPAEDIC SURGERY | Admitting: ORTHOPAEDIC SURGERY
Payer: COMMERCIAL

## 2022-12-02 RX ORDER — SODIUM CHLORIDE 0.9 % (FLUSH) 0.9 %
5-40 SYRINGE (ML) INJECTION EVERY 12 HOURS SCHEDULED
Status: CANCELLED | OUTPATIENT
Start: 2022-12-02

## 2022-12-02 RX ORDER — OXYCODONE HYDROCHLORIDE 5 MG/1
5 TABLET ORAL EVERY 4 HOURS PRN
Status: CANCELLED | OUTPATIENT
Start: 2022-12-02

## 2022-12-02 RX ORDER — MORPHINE SULFATE 2 MG/ML
2 INJECTION, SOLUTION INTRAMUSCULAR; INTRAVENOUS
Status: CANCELLED | OUTPATIENT
Start: 2022-12-02

## 2022-12-02 RX ORDER — MORPHINE SULFATE 4 MG/ML
4 INJECTION, SOLUTION INTRAMUSCULAR; INTRAVENOUS
Status: CANCELLED | OUTPATIENT
Start: 2022-12-02

## 2022-12-02 RX ORDER — SODIUM CHLORIDE, SODIUM LACTATE, POTASSIUM CHLORIDE, CALCIUM CHLORIDE 600; 310; 30; 20 MG/100ML; MG/100ML; MG/100ML; MG/100ML
INJECTION, SOLUTION INTRAVENOUS CONTINUOUS
Status: CANCELLED | OUTPATIENT
Start: 2022-12-08

## 2022-12-02 RX ORDER — SODIUM CHLORIDE 9 MG/ML
50 INJECTION, SOLUTION INTRAVENOUS CONTINUOUS
Status: CANCELLED | OUTPATIENT
Start: 2022-12-02 | End: 2022-12-02

## 2022-12-02 RX ORDER — SODIUM CHLORIDE 0.9 % (FLUSH) 0.9 %
5-40 SYRINGE (ML) INJECTION PRN
Status: CANCELLED | OUTPATIENT
Start: 2022-12-02

## 2022-12-02 RX ORDER — SODIUM CHLORIDE 9 MG/ML
INJECTION, SOLUTION INTRAVENOUS PRN
Status: CANCELLED | OUTPATIENT
Start: 2022-12-02

## 2022-12-02 NOTE — H&P
[address]                      [facility], [address] [facility]                              HISTORY AND PHYSICAL    PATIENT NAME: Sheldon Knapp                  :        1985  MED REC NO:   [id number]                            ROOM:  ACCOUNT NO:   [de-identified]                           ADMIT DATE: 2022  PROVIDER:     Sherry Westfall PA-C    Dictating for Jarvis Marie MD.    CHIEF COMPLAINT:  Right shoulder pain. HISTORY OF PRESENT ILLNESS:  The patient with right shoulder pain for  almost one year. Diagnostic studies show impingement, biceps  instability, and subscapularis strain. Plan is right shoulder  arthroscopy, which will be performed on 2022 at [facility] in [facility] [place]. PAST MEDICAL HISTORY:  Significant for hypertension, asthma, and acid  reflux. PAST SURGICAL HISTORY:  Significant for colonoscopy. He does report  that he felt he was awake during most of the procedure. SOCIAL HISTORY:  The patient denies any substance abuse. Reports one to  two alcoholic beverages per month. Denies any smoking history. FAMILY HISTORY:  Significant for heart disease. ALLERGIES:  To LISINOPRIL, NORVASC, and COMPAZINE. REVIEW OF SYSTEMS:  Noncontributory. PHYSICAL EXAMINATION:  GENERAL:  A 18-year-old  male. VITAL SIGNS:  Height 5 feet 8 inches. Weight 230. HEENT:  His pupils are equal, round, and reactive to light and  accommodation. Extraocular eye movements are intact. CHEST:  Lungs are clear to auscultation bilaterally. CARDIAC:  Regular rate and rhythm. No murmurs, rubs, or gallops  appreciated. ABDOMEN:  Soft and nontender. Normoactive bowel sounds x4 quadrants. EXTREMITIES:  Right shoulder: The patient has forward flexion up to 160  degrees, abduction of 90 degrees, external rotation 90 degrees, internal  rotation L4-L5. NEUROLOGIC:  CN II through XII are grossly intact.     ASSESSMENT:  Right shoulder impingement, biceps instability, and subscap  strain. PLAN:  Right shoulder arthroscopy with subacromial decompression,  rotator cuff debridement versus repair, and possible long head biceps  tenodesis. This will be performed on 12/02/2022 at [facility]  in [facility].         Annie Guthrie    D: 12/01/2022 16:30:53       T: 12/01/2022 16:35:00     [email or web address]  Job#: [id number]     Doc#: [id number]    CC:

## 2022-12-02 NOTE — H&P
Interval History and Physical    I have interviewed and examined the patient and reviewed the recent History and Physical.  There have been no changes to the recent H&P documentation. No change in ROS or PE. Pt's allergies reviewed and no change List of meds on original H&P    No significant findings with ROS, family hx, social  Hx, ALL, surgical hx, med list or medical history     The patient understands the planned operation and its associated risks and benefits and agrees to proceed. The surgical consent form has been signed. There were no vitals taken for this visit.      Electronically signed by Monae Chauhan MD on 12/2/2022 at 11:04 AM

## 2022-12-07 ENCOUNTER — ANESTHESIA EVENT (OUTPATIENT)
Dept: OPERATING ROOM | Age: 37
End: 2022-12-07
Payer: COMMERCIAL

## 2022-12-07 RX ORDER — OXYCODONE HYDROCHLORIDE 5 MG/1
5 TABLET ORAL PRN
Status: CANCELLED | OUTPATIENT
Start: 2022-12-07 | End: 2022-12-07

## 2022-12-07 RX ORDER — METOCLOPRAMIDE HYDROCHLORIDE 5 MG/ML
10 INJECTION INTRAMUSCULAR; INTRAVENOUS
Status: CANCELLED | OUTPATIENT
Start: 2022-12-07 | End: 2022-12-08

## 2022-12-07 RX ORDER — FENTANYL CITRATE 50 UG/ML
50 INJECTION, SOLUTION INTRAMUSCULAR; INTRAVENOUS EVERY 10 MIN PRN
Status: CANCELLED | OUTPATIENT
Start: 2022-12-07

## 2022-12-07 RX ORDER — ONDANSETRON 2 MG/ML
4 INJECTION INTRAMUSCULAR; INTRAVENOUS
Status: CANCELLED | OUTPATIENT
Start: 2022-12-07 | End: 2022-12-08

## 2022-12-07 RX ORDER — OXYCODONE HYDROCHLORIDE 5 MG/1
10 TABLET ORAL PRN
Status: CANCELLED | OUTPATIENT
Start: 2022-12-07 | End: 2022-12-07

## 2022-12-07 RX ORDER — LIDOCAINE HYDROCHLORIDE 10 MG/ML
1 INJECTION, SOLUTION INFILTRATION; PERINEURAL
Status: CANCELLED | OUTPATIENT
Start: 2022-12-07 | End: 2022-12-08

## 2022-12-07 RX ORDER — HYDROMORPHONE HCL 110MG/55ML
0.5 PATIENT CONTROLLED ANALGESIA SYRINGE INTRAVENOUS EVERY 10 MIN PRN
Status: CANCELLED | OUTPATIENT
Start: 2022-12-07

## 2022-12-07 RX ORDER — DIPHENHYDRAMINE HYDROCHLORIDE 50 MG/ML
12.5 INJECTION INTRAMUSCULAR; INTRAVENOUS
Status: CANCELLED | OUTPATIENT
Start: 2022-12-07 | End: 2022-12-08

## 2022-12-07 RX ORDER — MEPERIDINE HYDROCHLORIDE 50 MG/ML
12.5 INJECTION INTRAMUSCULAR; INTRAVENOUS; SUBCUTANEOUS
Status: CANCELLED | OUTPATIENT
Start: 2022-12-07 | End: 2022-12-08

## 2022-12-08 ENCOUNTER — ANESTHESIA (OUTPATIENT)
Dept: OPERATING ROOM | Age: 37
End: 2022-12-08
Payer: COMMERCIAL

## 2022-12-08 ENCOUNTER — HOSPITAL ENCOUNTER (OUTPATIENT)
Age: 37
Setting detail: OUTPATIENT SURGERY
Discharge: HOME | End: 2022-12-08
Attending: ORTHOPAEDIC SURGERY | Admitting: ORTHOPAEDIC SURGERY
Payer: COMMERCIAL

## 2022-12-08 VITALS
DIASTOLIC BLOOD PRESSURE: 59 MMHG | HEART RATE: 66 BPM | RESPIRATION RATE: 18 BRPM | BODY MASS INDEX: 33.35 KG/M2 | SYSTOLIC BLOOD PRESSURE: 117 MMHG | TEMPERATURE: 97 F | WEIGHT: 220.02 LBS | HEIGHT: 68 IN | OXYGEN SATURATION: 96 %

## 2022-12-08 PROCEDURE — 2500000003 HC RX 250 WO HCPCS: Performed by: ANESTHESIOLOGY

## 2022-12-08 PROCEDURE — 7100000011 HC PHASE II RECOVERY - ADDTL 15 MIN: Performed by: ORTHOPAEDIC SURGERY

## 2022-12-08 PROCEDURE — 7100000010 HC PHASE II RECOVERY - FIRST 15 MIN: Performed by: ORTHOPAEDIC SURGERY

## 2022-12-08 PROCEDURE — 7100000001 HC PACU RECOVERY - ADDTL 15 MIN: Performed by: ORTHOPAEDIC SURGERY

## 2022-12-08 PROCEDURE — 6360000002 HC RX W HCPCS: Performed by: ANESTHESIOLOGY

## 2022-12-08 PROCEDURE — 7100000000 HC PACU RECOVERY - FIRST 15 MIN: Performed by: ORTHOPAEDIC SURGERY

## 2022-12-08 PROCEDURE — 2720000010 HC SURG SUPPLY STERILE: Performed by: ORTHOPAEDIC SURGERY

## 2022-12-08 PROCEDURE — 3600000013 HC SURGERY LEVEL 3 ADDTL 15MIN: Performed by: ORTHOPAEDIC SURGERY

## 2022-12-08 PROCEDURE — 2580000003 HC RX 258: Performed by: ANESTHESIOLOGY

## 2022-12-08 PROCEDURE — 2709999900 HC NON-CHARGEABLE SUPPLY: Performed by: ORTHOPAEDIC SURGERY

## 2022-12-08 PROCEDURE — 3600000003 HC SURGERY LEVEL 3 BASE: Performed by: ORTHOPAEDIC SURGERY

## 2022-12-08 PROCEDURE — A4217 STERILE WATER/SALINE, 500 ML: HCPCS | Performed by: ORTHOPAEDIC SURGERY

## 2022-12-08 PROCEDURE — 3700000001 HC ADD 15 MINUTES (ANESTHESIA): Performed by: ORTHOPAEDIC SURGERY

## 2022-12-08 PROCEDURE — 76942 ECHO GUIDE FOR BIOPSY: CPT | Performed by: ANESTHESIOLOGY

## 2022-12-08 PROCEDURE — 2580000003 HC RX 258: Performed by: ORTHOPAEDIC SURGERY

## 2022-12-08 PROCEDURE — 6360000002 HC RX W HCPCS: Performed by: ORTHOPAEDIC SURGERY

## 2022-12-08 PROCEDURE — 3700000000 HC ANESTHESIA ATTENDED CARE: Performed by: ORTHOPAEDIC SURGERY

## 2022-12-08 RX ORDER — DIPHENHYDRAMINE HYDROCHLORIDE 50 MG/ML
INJECTION INTRAMUSCULAR; INTRAVENOUS PRN
Status: DISCONTINUED | OUTPATIENT
Start: 2022-12-08 | End: 2022-12-08 | Stop reason: SDUPTHER

## 2022-12-08 RX ORDER — SODIUM CHLORIDE, SODIUM LACTATE, POTASSIUM CHLORIDE, CALCIUM CHLORIDE 600; 310; 30; 20 MG/100ML; MG/100ML; MG/100ML; MG/100ML
1000 INJECTION, SOLUTION INTRAVENOUS CONTINUOUS
Status: DISCONTINUED | OUTPATIENT
Start: 2022-12-08 | End: 2022-12-08 | Stop reason: HOSPADM

## 2022-12-08 RX ORDER — ROPIVACAINE HYDROCHLORIDE 5 MG/ML
INJECTION, SOLUTION EPIDURAL; INFILTRATION; PERINEURAL
Status: COMPLETED | OUTPATIENT
Start: 2022-12-08 | End: 2022-12-08

## 2022-12-08 RX ORDER — SODIUM CHLORIDE 9 MG/ML
INJECTION, SOLUTION INTRAVENOUS PRN
Status: DISCONTINUED | OUTPATIENT
Start: 2022-12-08 | End: 2022-12-08 | Stop reason: HOSPADM

## 2022-12-08 RX ORDER — PROPOFOL 10 MG/ML
INJECTION, EMULSION INTRAVENOUS PRN
Status: DISCONTINUED | OUTPATIENT
Start: 2022-12-08 | End: 2022-12-08 | Stop reason: SDUPTHER

## 2022-12-08 RX ORDER — PROMETHAZINE HYDROCHLORIDE 25 MG/ML
6.25 INJECTION, SOLUTION INTRAMUSCULAR; INTRAVENOUS EVERY 6 HOURS PRN
Status: DISCONTINUED | OUTPATIENT
Start: 2022-12-08 | End: 2022-12-08 | Stop reason: HOSPADM

## 2022-12-08 RX ORDER — ONDANSETRON 2 MG/ML
INJECTION INTRAMUSCULAR; INTRAVENOUS PRN
Status: DISCONTINUED | OUTPATIENT
Start: 2022-12-08 | End: 2022-12-08 | Stop reason: SDUPTHER

## 2022-12-08 RX ORDER — MORPHINE SULFATE 2 MG/ML
2 INJECTION, SOLUTION INTRAMUSCULAR; INTRAVENOUS
Status: DISCONTINUED | OUTPATIENT
Start: 2022-12-08 | End: 2022-12-08 | Stop reason: HOSPADM

## 2022-12-08 RX ORDER — FENTANYL CITRATE 50 UG/ML
50 INJECTION, SOLUTION INTRAMUSCULAR; INTRAVENOUS EVERY 10 MIN PRN
Status: DISCONTINUED | OUTPATIENT
Start: 2022-12-08 | End: 2022-12-08 | Stop reason: HOSPADM

## 2022-12-08 RX ORDER — MIDAZOLAM HYDROCHLORIDE 1 MG/ML
INJECTION INTRAMUSCULAR; INTRAVENOUS PRN
Status: DISCONTINUED | OUTPATIENT
Start: 2022-12-08 | End: 2022-12-08 | Stop reason: SDUPTHER

## 2022-12-08 RX ORDER — FENTANYL CITRATE 50 UG/ML
INJECTION, SOLUTION INTRAMUSCULAR; INTRAVENOUS PRN
Status: DISCONTINUED | OUTPATIENT
Start: 2022-12-08 | End: 2022-12-08 | Stop reason: SDUPTHER

## 2022-12-08 RX ORDER — MEPERIDINE HYDROCHLORIDE 50 MG/ML
12.5 INJECTION INTRAMUSCULAR; INTRAVENOUS; SUBCUTANEOUS
Status: DISCONTINUED | OUTPATIENT
Start: 2022-12-08 | End: 2022-12-08 | Stop reason: HOSPADM

## 2022-12-08 RX ORDER — SODIUM CHLORIDE 0.9 % (FLUSH) 0.9 %
5-40 SYRINGE (ML) INJECTION PRN
Status: DISCONTINUED | OUTPATIENT
Start: 2022-12-08 | End: 2022-12-08 | Stop reason: HOSPADM

## 2022-12-08 RX ORDER — SODIUM CHLORIDE 0.9 % (FLUSH) 0.9 %
5-40 SYRINGE (ML) INJECTION EVERY 12 HOURS SCHEDULED
Status: DISCONTINUED | OUTPATIENT
Start: 2022-12-08 | End: 2022-12-08 | Stop reason: HOSPADM

## 2022-12-08 RX ORDER — OXYCODONE HYDROCHLORIDE 5 MG/1
5 TABLET ORAL EVERY 4 HOURS PRN
Status: DISCONTINUED | OUTPATIENT
Start: 2022-12-08 | End: 2022-12-08 | Stop reason: HOSPADM

## 2022-12-08 RX ORDER — ROCURONIUM BROMIDE 10 MG/ML
INJECTION, SOLUTION INTRAVENOUS PRN
Status: DISCONTINUED | OUTPATIENT
Start: 2022-12-08 | End: 2022-12-08 | Stop reason: SDUPTHER

## 2022-12-08 RX ORDER — HYDROMORPHONE HCL 110MG/55ML
0.5 PATIENT CONTROLLED ANALGESIA SYRINGE INTRAVENOUS EVERY 10 MIN PRN
Status: DISCONTINUED | OUTPATIENT
Start: 2022-12-08 | End: 2022-12-08 | Stop reason: HOSPADM

## 2022-12-08 RX ORDER — MAGNESIUM HYDROXIDE 1200 MG/15ML
LIQUID ORAL CONTINUOUS PRN
Status: DISCONTINUED | OUTPATIENT
Start: 2022-12-08 | End: 2022-12-08 | Stop reason: HOSPADM

## 2022-12-08 RX ORDER — MORPHINE SULFATE 4 MG/ML
4 INJECTION, SOLUTION INTRAMUSCULAR; INTRAVENOUS
Status: DISCONTINUED | OUTPATIENT
Start: 2022-12-08 | End: 2022-12-08 | Stop reason: HOSPADM

## 2022-12-08 RX ORDER — LIDOCAINE HYDROCHLORIDE 20 MG/ML
INJECTION, SOLUTION INFILTRATION; PERINEURAL PRN
Status: DISCONTINUED | OUTPATIENT
Start: 2022-12-08 | End: 2022-12-08 | Stop reason: SDUPTHER

## 2022-12-08 RX ORDER — OXYCODONE HYDROCHLORIDE 5 MG/1
10 TABLET ORAL PRN
Status: DISCONTINUED | OUTPATIENT
Start: 2022-12-08 | End: 2022-12-08 | Stop reason: HOSPADM

## 2022-12-08 RX ORDER — METOCLOPRAMIDE HYDROCHLORIDE 5 MG/ML
10 INJECTION INTRAMUSCULAR; INTRAVENOUS
Status: DISCONTINUED | OUTPATIENT
Start: 2022-12-08 | End: 2022-12-08 | Stop reason: HOSPADM

## 2022-12-08 RX ORDER — DEXAMETHASONE SODIUM PHOSPHATE 10 MG/ML
INJECTION INTRAMUSCULAR; INTRAVENOUS PRN
Status: DISCONTINUED | OUTPATIENT
Start: 2022-12-08 | End: 2022-12-08 | Stop reason: SDUPTHER

## 2022-12-08 RX ORDER — SODIUM CHLORIDE 9 MG/ML
25 INJECTION, SOLUTION INTRAVENOUS PRN
Status: DISCONTINUED | OUTPATIENT
Start: 2022-12-08 | End: 2022-12-08 | Stop reason: HOSPADM

## 2022-12-08 RX ORDER — OXYCODONE HYDROCHLORIDE 5 MG/1
5 TABLET ORAL PRN
Status: DISCONTINUED | OUTPATIENT
Start: 2022-12-08 | End: 2022-12-08 | Stop reason: HOSPADM

## 2022-12-08 RX ORDER — DIPHENHYDRAMINE HYDROCHLORIDE 50 MG/ML
12.5 INJECTION INTRAMUSCULAR; INTRAVENOUS
Status: DISCONTINUED | OUTPATIENT
Start: 2022-12-08 | End: 2022-12-08 | Stop reason: HOSPADM

## 2022-12-08 RX ORDER — SODIUM CHLORIDE 9 MG/ML
50 INJECTION, SOLUTION INTRAVENOUS CONTINUOUS
Status: DISCONTINUED | OUTPATIENT
Start: 2022-12-08 | End: 2022-12-08 | Stop reason: HOSPADM

## 2022-12-08 RX ORDER — SCOLOPAMINE TRANSDERMAL SYSTEM 1 MG/1
1 PATCH, EXTENDED RELEASE TRANSDERMAL
Status: DISCONTINUED | OUTPATIENT
Start: 2022-12-08 | End: 2022-12-08 | Stop reason: HOSPADM

## 2022-12-08 RX ADMIN — LIDOCAINE HYDROCHLORIDE 100 MG: 20 INJECTION, SOLUTION INFILTRATION; PERINEURAL at 08:39

## 2022-12-08 RX ADMIN — PHENYLEPHRINE HYDROCHLORIDE 100 MCG: 10 INJECTION INTRAVENOUS at 09:23

## 2022-12-08 RX ADMIN — ONDANSETRON 4 MG: 2 INJECTION INTRAMUSCULAR; INTRAVENOUS at 09:23

## 2022-12-08 RX ADMIN — DIPHENHYDRAMINE HYDROCHLORIDE 12.5 MG: 50 INJECTION INTRAMUSCULAR; INTRAVENOUS at 08:53

## 2022-12-08 RX ADMIN — SODIUM CHLORIDE, POTASSIUM CHLORIDE, SODIUM LACTATE AND CALCIUM CHLORIDE 1000 ML: 600; 310; 30; 20 INJECTION, SOLUTION INTRAVENOUS at 07:26

## 2022-12-08 RX ADMIN — PROPOFOL 200 MG: 10 INJECTION, EMULSION INTRAVENOUS at 08:39

## 2022-12-08 RX ADMIN — ROPIVACAINE HYDROCHLORIDE 20 ML: 5 INJECTION, SOLUTION EPIDURAL; INFILTRATION; PERINEURAL at 08:20

## 2022-12-08 RX ADMIN — ROCURONIUM BROMIDE 50 MG: 10 INJECTION INTRAVENOUS at 08:40

## 2022-12-08 RX ADMIN — FENTANYL CITRATE 50 MCG: 50 INJECTION INTRAMUSCULAR; INTRAVENOUS at 08:39

## 2022-12-08 RX ADMIN — SUGAMMADEX 200 MG: 100 INJECTION, SOLUTION INTRAVENOUS at 09:36

## 2022-12-08 RX ADMIN — DEXAMETHASONE SODIUM PHOSPHATE 8 MG: 10 INJECTION INTRAMUSCULAR; INTRAVENOUS at 08:53

## 2022-12-08 RX ADMIN — MIDAZOLAM HYDROCHLORIDE 2 MG: 2 INJECTION, SOLUTION INTRAMUSCULAR; INTRAVENOUS at 08:20

## 2022-12-08 RX ADMIN — CEFAZOLIN 2000 MG: 10 INJECTION, POWDER, FOR SOLUTION INTRAVENOUS at 08:49

## 2022-12-08 ASSESSMENT — PAIN - FUNCTIONAL ASSESSMENT: PAIN_FUNCTIONAL_ASSESSMENT: 0-10

## 2022-12-08 ASSESSMENT — PAIN SCALES - GENERAL: PAINLEVEL_OUTOF10: 0

## 2022-12-08 NOTE — ANESTHESIA PROCEDURE NOTES
Peripheral Block    Patient location during procedure: pre-op  Reason for block: post-op pain management and at surgeon's request  Start time: 12/8/2022 8:20 AM  End time: 12/8/2022 8:30 AM  Staffing  Performed: anesthesiologist   Anesthesiologist: Judge Sidney MD  Preanesthetic Checklist  Completed: patient identified, IV checked, site marked, risks and benefits discussed, surgical/procedural consents, equipment checked, pre-op evaluation, timeout performed, anesthesia consent given, oxygen available, monitors applied/VS acknowledged, fire risk safety assessment completed and verbalized and blood product R/B/A discussed and consented  Peripheral Block   Patient position: sitting  Prep: ChloraPrep  Provider prep: mask and sterile gloves  Patient monitoring: cardiac monitor, continuous pulse ox, frequent blood pressure checks, IV access, oxygen and responsive to questions  Block type: Brachial plexus  Interscalene  Laterality: right  Injection technique: single-shot  Guidance: ultrasound guided  Local infiltration: lidocaine  Infiltration strength: 1 %  Local infiltration: lidocaine  Dose: 2 mL    Needle   Needle type: insulated echogenic nerve stimulator needle   Needle gauge: 21 G  Needle localization: ultrasound guidance  Needle insertion depth: 3 cm  Needle length: 5 cm  Assessment   Injection assessment: negative aspiration for heme, local visualized surrounding nerve on ultrasound and no intravascular symptoms  Paresthesia pain: immediately resolved  Slow fractionated injection: yes  Hemodynamics: stable  Real-time US image taken/store: yes  Outcomes: patient tolerated procedure well    Additional Notes  Risks and benefits of block discussed with patient. Risks included infection, bleeding, permanent nerve damage, and phrenic nerve block. No evidence of intraneuronal injection. Patient understood and desired peripheral nerve block.   Medications Administered  ropivacaine (NAROPIN) injection 0.5% - Perineural   20 mL - 12/8/2022 8:20:00 AM

## 2022-12-08 NOTE — OP NOTE
44 [facility], [address]                                OPERATIVE REPORT    PATIENT NAME: Jyoti Mcguire                  :        1985  MED REC NO:   [id number]                              ROOM:  ACCOUNT NO:   [de-identified]                           ADMIT DATE: 2022  PROVIDER:     Sarah Hill MD    DATE OF PROCEDURE:  2022    SURGEON:  Sarah Hill MD     ASSISTANT:  There was no assistant. PREOPERATIVE DIAGNOSIS:  Right shoulder biceps subscap strain. POSTOPERATIVE DIAGNOSES:  Labral tear, partial undersurface cuff tear,  shoulder impingement. PROCEDURES PERFORMED:  1. Right shoulder arthroscopic extensive debridement of partial  undersurface cuff tear and right shoulder extensive debridement of  labral tear. 2.  Right shoulder arthroscopic subacromial decompression. BLOOD LOSS:  Minimal.    FLUIDS:  2 liters. ANESTHESIA:  General with a scalene block. COMPLICATIONS:  None. INDICATION:  A 58-year-old male with ongoing shoulder pain after an  injury. MRI suggests labral tearing and strain of the biceps and  subscapularis, now with continued pain for arthroscopy. DESCRIPTION OF THE OPERATIVE PROCEDURE:  The patient was brought to the  operating room at [facility]. Had induction of anesthesia,  was placed in lateral position. Exam under anesthesia showed full  motion. There was some mild popping with flexion, extension, abduction,  rotation, but there was no gross instability. Standard prep and drape was performed. The arm was placed in traction. Standard portals were established, posteriorly and anteriorly over a  switching stick. Inspection of the glenohumeral joint was carried out. There was a large circumferential bucket-handle labral tear.   It  involved the inner 80% of the labrum starting posteriorly at roughly 9  o'clock extending to the base of the biceps and anteriorly to 3 o'clock. It was extensively debrided and smoothed. The anterior inferior  glenohumeral ligaments were intact. The anterior inferior labrum was  intact. The residual labrum was intact after the extensive debridement. The biceps was intact. The biceps was smooth as it exited the shoulder. It was probed and pulled into the joint and was found not to be  unstable, and it was not subluxing. The residual biceps anchor was  adequate and secure. There was a partial undersurface cuff tear at the  biceps exit that was debrided and smoothed, and it was estimated to be  partial thickness of around 20% only at the interval and it did not  extend all the way through. It was marked with a marker suture. The  posterior cuff was intact. The anterior cuff was intact. The axillary  pouch was opened. The glenohumeral joint articular surfaces were  pristine. The arm was placed in the sub-bursal position. There was a fairly  impressive bursitis. A bursectomy was carried out. Decompression was  performed sweeping from back to front removing a 12 mm osteophyte at the  undersurface of the acromion. This allowed for full inspection of the  cuff which was found to be intact. There was no full-thickness  penetrating communication. The marker suture was removed. Lavage  irrigation completed. The bursal space was drained. The portals were  closed. Compressive dressing wrap was applied. The patient was placed  in a sling and then taken to recovery room.         Moody Angulo MD    D: 12/08/2022 9:47:37       T: 12/08/2022 9:51:27     [email or web address]  Job#: [id number]     Doc#: [id number]    CC:

## 2022-12-08 NOTE — DISCHARGE INSTRUCTIONS
extremity  [] partial weight bearing  [] Full weight bearing as tolerated   []  Use brace whenever walking   [] other      10) Begin physical therapy if advised by you physician:   [] before returning to see you doctor   [x] not until you follow up with your doctor    11) call your doctor at [phone] for an appointment (or follow up as scheduled)    [facility] office if  Increased redness, swelling, drainage of any kind, and/or pain to surgery site. As well as new onset fevers and or chills. These could signify an infection. Calf or thigh tenderness to touch as well as increased swelling or redness. This could signify a clot formation. Numbness or tingling to an area around the incision site or below the incision site (toes). Or if the operative extremity becomes cold, blue. Any rash appears, increased  or new onset nausea/vomiting occur. This may indicate a reaction to a medication. Temp is 38.5 C (101F)  12) If you have any concerns or questions, please call [facility] surgeon on call.  The 24- hour phone is [phone]) If you are unable to contact your surgeon, in an emergency situation, go to the nearest hospital

## 2022-12-08 NOTE — H&P
44 [address] [address]                              HISTORY AND PHYSICAL    PATIENT NAME: Bishop Nicholas               :        1985  MED REC NO:   [id number]                              ROOM:  ACCOUNT NO:   [de-identified]                           ADMIT DATE: 2022  PROVIDER:     Alisson Ricci PA-C      CHIEF COMPLAINT:  Right shoulder pain. HISTORY OF PRESENT ILLNESS:  The patient sustained a fall last winter  and had pain since. Conservative therapies would provide brief relief,  but pain would return. Diagnostic studies show biceps instability and  rotator cuff strain. After risks, benefits, and alternatives were  discussed, the patient elected to proceed with right shoulder  arthroscopy. This will be performed on 2022 at [facility] in [facility]. PAST MEDICAL HISTORY:  Significant for hypertension, asthma. ALLERGIES TO MEDICATIONS:  LISINOPRIL, NORVASC, and COMPAZINE. PAST SURGICAL HISTORY:  Just colonoscopy. SOCIAL HISTORY:  The patient denies substance abuse. Reports one to  alcoholic beverages per month. Denies any smoking history. FAMILY HISTORY:  Significant for heart disease. REVIEW OF SYSTEMS:  Noncontributory. PHYSICAL EXAMINATION:  GENERAL:  A 12-year-old  male. VITAL SIGNS:  Height 5 feet 8 inches, weight 230. HEENT:  Eyes:  Pupils equal, round, and reactive to light and  accommodation. Extraocular movements are intact. CHEST:  Lungs are clear to auscultation bilaterally. CARDIAC:  Regular rate and rhythm. No murmurs, rubs, or gallops  appreciated. ABDOMEN:  Soft and nontender. Normoactive bowel sounds x4 quadrants. EXTREMITIES:  Right shoulder, the patient has forward flexion to 160  degrees, flexion 90 degrees, external rotation 90 degrees, internal  rotation L4-L5.     ASSESSMENT:  Right shoulder impingement, subscapularis strain, biceps  strain, biceps instability. PLAN:  Right shoulder arthroscopy, subacromial decompression, possible  long biceps tenodesis, possible rotator cuff repair. Surgery will be  performed on 12/08/2022 at [facility].         Jace Warner    D: 12/07/2022 15:39:14       T: 12/07/2022 17:32:12     [email or web address]  Job#: [id number]     Doc#: [id number]

## 2022-12-08 NOTE — BRIEF OP NOTE
Brief Postoperative Note      Patient: Beti Olivares  YOB: 1985  MRN: [id number]    Date of Procedure: 12/8/2022    Pre-Op Diagnosis: RIGHT SHOULDER SUBSCAP AND BICEP STRAIN, INSTABILITY, RIGHT SHOULDER    Post-Op Diagnosis:  rt labal tear, partial rct and impingement       Procedure(s):  RIGHT SHOULDER ARTHROSCOPY ROTATOR CUFF REPAIR, ARTHROSCOPY SUBACROMIAL DECOMPRESSION DEBRIDEMENT    Surgeon(s):  Eloise Batista MD    Assistant:  Surgical Assistant: Mely Meza  Physician Assistant: Shola Victor PA-C    Anesthesia: Other    Estimated Blood Loss (mL): Minimal    Complications: None    Specimens:   * No specimens in log *    Implants:  * No implants in log *      Drains: * No LDAs found *    Findings: rt large labral tear, p rct and impingement    Electronically signed by Eloise Batista MD on 12/8/2022 at 9:38 AM

## 2022-12-08 NOTE — ANESTHESIA PRE PROCEDURE
Department of Anesthesiology  Preprocedure Note       Name:  Rory Stevenson   Age:  42 y.o.  :  1985                                          MRN:  [id number]         Date:  2022      Surgeon: Stephanie Michael):  Kay Plascencia MD    Procedure: Procedure(s):  RIGHT SHOULDER ARTHROSCOPY POSSIBLE ROTATOR CUFF REPAIR, ARTHROSCOPY SUBACROMIAL DECOMPRESSION AND BICEP TENODESIS SCALENE BLOCK  2ND CASE    Medications prior to admission:   Prior to Admission medications    Medication Sig Start Date End Date Taking? Authorizing Provider   albuterol sulfate HFA (PROVENTIL;VENTOLIN;PROAIR) 108 (90 Base) MCG/ACT inhaler INHALE 2 PUFFS BY MOUTH EVERY 6 HOURS AS NEEDED FOR WHEEZE 22   Adilia Madsen MD   hydrOXYzine pamoate (VISTARIL) 25 MG capsule TAKE 1 CAPSULE BY MOUTH 3 TIMES DAILY AS NEEDED FOR ANXIETY. 22   Adilia Madsen MD   HYDROcodone-acetaminophen (NORCO) 5-325 MG per tablet TAKE 1 TABLET BY MOUTH TWICE A DAY AS NEEDED 22   Historical Provider, MD   naproxen (NAPROSYN) 500 MG tablet TAKE 1 TABLET BY MOUTH TWICE A DAY AS NEEDED FOR PAIN 22   Adilia Madsen MD   lamoTRIgine (LAMICTAL XR) 200 MG TB24 extended release tablet TAKE 1 TABLET BY MOUTH EVERY DAY 22   Adilia Madsen MD   tadalafil (CIALIS) 5 MG tablet Take 1 tablet by mouth daily 22   Adilia Madsen MD   testosterone cypionate (DEPOTESTOTERONE CYPIONATE) 200 MG/ML injection Inject 1 mL into the muscle every 14 days for 180 days.  22  Adilia Madsen MD   omeprazole (PRILOSEC) 40 MG delayed release capsule TAKE 1 CAPSULE BY MOUTH EVERY DAY 22   Adilia Madsen MD   metoprolol (LOPRESSOR) 100 MG tablet TAKE 1 TABLET BY MOUTH TWICE A DAY 3/22/22   Adilia Madsen MD   fluticasone-salmeterol (ADVAIR DISKUS) 100-50 MCG/DOSE diskus inhaler Inhale 1 puff into the lungs every 12 hours 22   Adilia Madsen MD   albuterol (PROVENTIL) (2.5 MG/3ML) 0.083% nebulizer solution Take 3 mLs by nebulization every 6 hours as needed for Wheezing 12/28/21   Real Almodovar MD   EPINEPHrine (EPIPEN 2-AUGUSTUS) 0.3 MG/0.3ML SOAJ injection Inject 0.3 mLs into the muscle once as needed (allergic reaction/bee sting) Use as directed for allergic reaction  Dispense 1 two pack (epi pen) 9/26/20 1/17/22  Raf Gudino,        Current medications:    No current facility-administered medications for this encounter. No current outpatient medications on file. Facility-Administered Medications Ordered in Other Encounters   Medication Dose Route Frequency Provider Last Rate Last Admin    ceFAZolin (ANCEF) 2000 mg in dextrose 5 % 100 mL IVPB  2,000 mg IntraVENous Once Michael Pisano MD           Allergies: Allergies   Allergen Reactions    Amlodipine Swelling    Bee Venom     Compazine [Prochlorperazine Maleate] Swelling    Lisinopril Hives and Swelling     Skin tightens on his face and gets hives       Problem List:    Patient Active Problem List   Diagnosis Code    Hypertension I10    Asthma J45.909    Gastroesophageal reflux disease with hiatal hernia K21.9, K44.9    Dermatitis L30.9       Past Medical History:        Diagnosis Date    Asthma     Dermatitis     Fatty liver     Gastroesophageal reflux disease with hiatal hernia     Hypertension     Obesity (BMI 30-39. 9)        Past Surgical History:        Procedure Laterality Date    COLONOSCOPY  2013    ENDOSCOPY, COLON, DIAGNOSTIC  2013    LYMPH NODE BIOPSY      surgery due to cat scratch disease age 1    WISDOM TOOTH EXTRACTION         Social History:    Social History     Tobacco Use    Smoking status: Never    Smokeless tobacco: Never   Substance Use Topics    Alcohol use: No     Alcohol/week: 0.0 standard drinks                                Counseling given: Not Answered      Vital Signs (Current):   Vitals:    12/07/22 1336   Weight: 220 lb 0.3 oz (99.8 kg)   Height: 5' 8\" (1.727 m)                                              BP Readings from Last 3 Encounters:   11/30/22 132/74 11/16/22 134/76   05/05/22 112/84       NPO Status:                                                                                 BMI:   Wt Readings from Last 3 Encounters:   11/30/22 220 lb (99.8 kg)   11/16/22 225 lb (102.1 kg)   05/05/22 234 lb (106.1 kg)     Body mass index is 33.45 kg/m². CBC:   Lab Results   Component Value Date/Time    WBC 7.1 11/30/2022 10:54 AM    RBC 5.19 11/30/2022 10:54 AM    RBC 5.41 02/20/2012 04:31 PM    HGB 15.7 11/30/2022 10:54 AM    HCT 46.6 11/30/2022 10:54 AM    MCV 89.9 11/30/2022 10:54 AM    RDW 13.5 11/30/2022 10:54 AM     11/30/2022 10:54 AM       CMP:   Lab Results   Component Value Date/Time     11/30/2022 10:54 AM    K 3.9 11/30/2022 10:54 AM     11/30/2022 10:54 AM    CO2 27 11/30/2022 10:54 AM    BUN 12 11/30/2022 10:54 AM    CREATININE 0.76 11/30/2022 10:54 AM    GFRAA >60.0 10/06/2020 03:20 PM    LABGLOM >60.0 11/30/2022 10:54 AM    GLUCOSE 98 11/30/2022 10:54 AM    GLUCOSE 89 02/20/2012 04:31 PM    PROT 6.6 11/30/2022 10:54 AM    CALCIUM 9.3 11/30/2022 10:54 AM    BILITOT 0.8 11/30/2022 10:54 AM    ALKPHOS 52 11/30/2022 10:54 AM    AST 19 11/30/2022 10:54 AM    ALT 34 11/30/2022 10:54 AM       POC Tests: No results for input(s): POCGLU, POCNA, POCK, POCCL, POCBUN, POCHEMO, POCHCT in the last 72 hours.     Coags:   Lab Results   Component Value Date/Time    PROTIME 13.5 11/30/2022 10:54 AM    INR 1.0 11/30/2022 10:54 AM    APTT 32.8 11/30/2022 10:54 AM       HCG (If Applicable): No results found for: PREGTESTUR, PREGSERUM, HCG, HCGQUANT     ABGs: No results found for: PHART, PO2ART, QJB2AGR, XJY8COH, BEART, C8WEFUJW     Type & Screen (If Applicable):  No results found for: LABABO, LABRH    Drug/Infectious Status (If Applicable):  No results found for: HIV, HEPCAB    COVID-19 Screening (If Applicable):   Lab Results   Component Value Date/Time    COVID19 Not-Detected 10/18/2022 01:24 PM    COVID19 Not Detected 10/21/2021 04:24 PM Anesthesia Evaluation  Patient summary reviewed  Airway: Mallampati: II  TM distance: >3 FB   Neck ROM: full  Mouth opening: > = 3 FB   Dental:          Pulmonary:   (+) asthma:                            Cardiovascular:    (+) hypertension:,                   Neuro/Psych:   (+) neuromuscular disease:,             GI/Hepatic/Renal:   (+) GERD:, liver disease:,           Endo/Other:                     Abdominal:             Vascular: Other Findings:           Anesthesia Plan      general     ASA 2     (Interscalene nerve block)  Induction: intravenous. MIPS: Postoperative opioids intended and Prophylactic antiemetics administered. Anesthetic plan and risks discussed with patient.         Attending anesthesiologist reviewed and agrees with Tabby Zepeda MD   12/7/2022

## 2022-12-08 NOTE — H&P
Interval History and Physical    I have interviewed and examined the patient and reviewed the recent History and Physical.  There have been no changes to the recent H&P documentation. No change in ROS or PE. Pt's allergies reviewed and no change List of meds on original H&P    No significant findings with ROS, family hx, social  Hx, ALL, surgical hx, med list or medical history     The patient understands the planned operation and its associated risks and benefits and agrees to proceed. The surgical consent form has been signed.     BP (!) 136/90   Pulse 67   Temp 97.6 °F (36.4 °C) (Temporal)   Resp 20   Ht 5' 8\" (1.727 m)   Wt 220 lb 0.3 oz (99.8 kg)   SpO2 100%   BMI 33.45 kg/m²      Electronically signed by Sarah Carrillo MD on 12/8/2022 at 7:26 AM

## 2022-12-08 NOTE — ANESTHESIA POSTPROCEDURE EVALUATION
Department of Anesthesiology  Postprocedure Note    Patient: Nathaly Villanueva  MRN: [id number]  YOB: 1985  Date of evaluation: 12/8/2022      Procedure Summary     Date: 12/08/22 Room / Location: [facility] OR [address] [place] [address]    Anesthesia Start: 0660 Anesthesia Stop: 8894    Procedure: RIGHT SHOULDER ARTHROSCOPY ROTATOR CUFF REPAIR, ARTHROSCOPY SUBACROMIAL DECOMPRESSION DEBRIDEMENT (Right: Shoulder) Diagnosis:       Biceps strain, right, initial encounter      Shoulder instability, right      (RIGHT SHOULDER SUBSCAP AND BICEP STRAIN, INSTABILITY, RIGHT SHOULDER)    Surgeons: Vinicio Scott MD Responsible Provider: Ferdinand To MD    Anesthesia Type: general, regional ASA Status: 2          Anesthesia Type: No value filed.     Verna Phase I: Verna Score: 9    Verna Phase II: Verna Score: 10      Anesthesia Post Evaluation    Patient location during evaluation: PACU  Patient participation: complete - patient participated  Level of consciousness: awake  Pain score: 0  Airway patency: patent  Nausea & Vomiting: no nausea and no vomiting  Complications: no  Cardiovascular status: blood pressure returned to baseline  Respiratory status: acceptable  Hydration status: euvolemic  Multimodal analgesia pain management approach

## 2022-12-08 NOTE — PROGRESS NOTES
Pt complaining of difficulty taking a deep breath related to the block. Dr. Jany Larkin notified. Dr. To bedside to eval and talk to pt. Pt ok to dc home.

## 2022-12-08 NOTE — ANESTHESIA PRE PROCEDURE
Department of Anesthesiology  Preprocedure Note       Name:  Leonor Corado   Age:  42 y.o.  :  1985                                          MRN:  [id number]         Date:  2022      Surgeon: Brian Crowell):  Chato Poole MD    Procedure: Procedure(s):  RIGHT SHOULDER ARTHROSCOPY POSSIBLE ROTATOR CUFF REPAIR, ARTHROSCOPY SUBACROMIAL DECOMPRESSION AND BICEP TENODESIS SCALENE BLOCK  2ND CASE    Medications prior to admission:   Prior to Admission medications    Medication Sig Start Date End Date Taking? Authorizing Provider   albuterol sulfate HFA (PROVENTIL;VENTOLIN;PROAIR) 108 (90 Base) MCG/ACT inhaler INHALE 2 PUFFS BY MOUTH EVERY 6 HOURS AS NEEDED FOR WHEEZE 22   Keon Hernadez MD   hydrOXYzine pamoate (VISTARIL) 25 MG capsule TAKE 1 CAPSULE BY MOUTH 3 TIMES DAILY AS NEEDED FOR ANXIETY. 22   Keon Hernadez MD   HYDROcodone-acetaminophen (NORCO) 5-325 MG per tablet TAKE 1 TABLET BY MOUTH TWICE A DAY AS NEEDED 22   Historical Provider, MD   naproxen (NAPROSYN) 500 MG tablet TAKE 1 TABLET BY MOUTH TWICE A DAY AS NEEDED FOR PAIN 22   Keon Hernadez MD   lamoTRIgine (LAMICTAL XR) 200 MG TB24 extended release tablet TAKE 1 TABLET BY MOUTH EVERY DAY 22   Keon Hernadez MD   tadalafil (CIALIS) 5 MG tablet Take 1 tablet by mouth daily 22   Keon Hernadez MD   testosterone cypionate (DEPOTESTOTERONE CYPIONATE) 200 MG/ML injection Inject 1 mL into the muscle every 14 days for 180 days.  22  Keon Hernadez MD   omeprazole (PRILOSEC) 40 MG delayed release capsule TAKE 1 CAPSULE BY MOUTH EVERY DAY 22   Keon Hernadez MD   metoprolol (LOPRESSOR) 100 MG tablet TAKE 1 TABLET BY MOUTH TWICE A DAY 3/22/22   Keon Hernadez MD   fluticasone-salmeterol (ADVAIR DISKUS) 100-50 MCG/DOSE diskus inhaler Inhale 1 puff into the lungs every 12 hours 22   Keon Hernadez MD   albuterol (PROVENTIL) (2.5 MG/3ML) 0.083% nebulizer solution Take 3 mLs by nebulization every 6 hours as needed for Wheezing 12/28/21   Elizabeth Levi MD   EPINEPHrine (EPIPEN 2-AUGUSTUS) 0.3 MG/0.3ML SOAJ injection Inject 0.3 mLs into the muscle once as needed (allergic reaction/bee sting) Use as directed for allergic reaction  Dispense 1 two pack (epi pen) 9/26/20 1/17/22  Juju Gudino DO       Current medications:    Current Facility-Administered Medications   Medication Dose Route Frequency Provider Last Rate Last Admin    ceFAZolin (ANCEF) 2,000 mg in dextrose 5 % 100 mL IVPB  2,000 mg IntraVENous Once Nathaly Anand MD        sodium chloride flush 0.9 % injection 5-40 mL  5-40 mL IntraVENous PRN Joe Burnette MD        scopolamine (TRANSDERM-SCOP) transdermal patch 1 patch  1 patch TransDERmal Q72H Joe Burnette MD        lactated ringers infusion 1,000 mL  1,000 mL IntraVENous Continuous Joe Burnette MD 50 mL/hr at 12/08/22 0726 1,000 mL at 12/08/22 0726    sodium chloride 0.9 % irrigation    Continuous PRN Nathaly Anand MD   3,000 mL at 12/08/22 1372     Facility-Administered Medications Ordered in Other Encounters   Medication Dose Route Frequency Provider Last Rate Last Admin    ceFAZolin (ANCEF) 2000 mg in dextrose 5 % 100 mL IVPB  2,000 mg IntraVENous Once Nathaly Anand MD           Allergies: Allergies   Allergen Reactions    Amlodipine Swelling    Bee Venom     Compazine [Prochlorperazine Maleate] Swelling    Lisinopril Hives and Swelling     Skin tightens on his face and gets hives       Problem List:    Patient Active Problem List   Diagnosis Code    Hypertension I10    Asthma J45.909    Gastroesophageal reflux disease with hiatal hernia K21.9, K44.9    Dermatitis L30.9       Past Medical History:        Diagnosis Date    Asthma     Dermatitis     Fatty liver     Gastroesophageal reflux disease with hiatal hernia     Hypertension     Obesity (BMI 30-39. 9)        Past Surgical History:        Procedure Laterality Date    COLONOSCOPY  2013    ENDOSCOPY, COLON, DIAGNOSTIC  2013    LYMPH NODE BIOPSY      surgery due to cat scratch disease age 1   Mark Malin [de-identified] TOOTH EXTRACTION         Social History:    Social History     Tobacco Use    Smoking status: Never    Smokeless tobacco: Never   Substance Use Topics    Alcohol use: No     Alcohol/week: 0.0 standard drinks                                Counseling given: Not Answered      Vital Signs (Current):   Vitals:    12/07/22 1336 12/08/22 0723   BP:  (!) 136/90   Pulse:  67   Resp:  20   Temp:  97.6 °F (36.4 °C)   TempSrc:  Temporal   SpO2:  100%   Weight: 220 lb 0.3 oz (99.8 kg)    Height: 5' 8\" (1.727 m) 5' 8\" (1.727 m)                                              BP Readings from Last 3 Encounters:   12/08/22 (!) 136/90   11/30/22 132/74   11/16/22 134/76       NPO Status: Time of last liquid consumption: 0500 (sip with meds)                        Time of last solid consumption: 1800                        Date of last liquid consumption: 12/08/22                        Date of last solid food consumption: 12/07/22    BMI:   Wt Readings from Last 3 Encounters:   12/07/22 220 lb 0.3 oz (99.8 kg)   11/30/22 220 lb (99.8 kg)   11/16/22 225 lb (102.1 kg)     Body mass index is 33.45 kg/m².     CBC:   Lab Results   Component Value Date/Time    WBC 7.1 11/30/2022 10:54 AM    RBC 5.19 11/30/2022 10:54 AM    RBC 5.41 02/20/2012 04:31 PM    HGB 15.7 11/30/2022 10:54 AM    HCT 46.6 11/30/2022 10:54 AM    MCV 89.9 11/30/2022 10:54 AM    RDW 13.5 11/30/2022 10:54 AM     11/30/2022 10:54 AM       CMP:   Lab Results   Component Value Date/Time     11/30/2022 10:54 AM    K 3.9 11/30/2022 10:54 AM     11/30/2022 10:54 AM    CO2 27 11/30/2022 10:54 AM    BUN 12 11/30/2022 10:54 AM    CREATININE 0.76 11/30/2022 10:54 AM    GFRAA >60.0 10/06/2020 03:20 PM    LABGLOM >60.0 11/30/2022 10:54 AM    GLUCOSE 98 11/30/2022 10:54 AM    GLUCOSE 89 02/20/2012 04:31 PM    PROT 6.6 11/30/2022 10:54 AM    CALCIUM 9.3 11/30/2022 10:54 AM    BILITOT 0.8 11/30/2022 10:54 AM    ALKPHOS 52 11/30/2022 10:54 AM    AST 19 11/30/2022 10:54 AM    ALT 34 11/30/2022 10:54 AM       POC Tests: No results for input(s): POCGLU, POCNA, POCK, POCCL, POCBUN, POCHEMO, POCHCT in the last 72 hours. Coags:   Lab Results   Component Value Date/Time    PROTIME 13.5 11/30/2022 10:54 AM    INR 1.0 11/30/2022 10:54 AM    APTT 32.8 11/30/2022 10:54 AM       HCG (If Applicable): No results found for: PREGTESTUR, PREGSERUM, HCG, HCGQUANT     ABGs: No results found for: PHART, PO2ART, ZOZ4VRI, VVP2XSG, BEART, B9RWKOGD     Type & Screen (If Applicable):  No results found for: LABABO, LABRH    Drug/Infectious Status (If Applicable):  No results found for: HIV, HEPCAB    COVID-19 Screening (If Applicable):   Lab Results   Component Value Date/Time    COVID19 Not-Detected 10/18/2022 01:24 PM    COVID19 Not Detected 10/21/2021 04:24 PM           Anesthesia Evaluation    Airway: Mallampati: III  TM distance: >3 FB     Mouth opening: > = 3 FB   Dental: normal exam         Pulmonary:normal exam    (+) asthma:                            Cardiovascular:    (+) hypertension:,          Beta Blocker:  Dose within 24 Hrs         Neuro/Psych:               GI/Hepatic/Renal:   (+) GERD:,           Endo/Other:                     Abdominal:             Vascular: Other Findings:           Anesthesia Plan      general and regional     ASA 2       Induction: intravenous. MIPS: Postoperative opioids intended. Anesthetic plan and risks discussed with patient.         Attending anesthesiologist reviewed and agrees with Preprocedure content                Jordyn Sherman MD   12/8/2022

## 2022-12-16 ENCOUNTER — TELEPHONE (OUTPATIENT)
Dept: FAMILY MEDICINE CLINIC | Age: 37
End: 2022-12-16

## 2022-12-16 DIAGNOSIS — S46.211A BICEPS MUSCLE STRAIN, RIGHT, INITIAL ENCOUNTER: Primary | ICD-10-CM

## 2022-12-16 DIAGNOSIS — M25.311 INSTABILITY OF RIGHT SHOULDER JOINT: ICD-10-CM

## 2022-12-16 NOTE — TELEPHONE ENCOUNTER
Pt came in wanting to know if he could have the location to his PT switched to the location in [place] across the [address] since he lives down the street?

## 2022-12-27 ENCOUNTER — HOSPITAL ENCOUNTER (OUTPATIENT)
Dept: PHYSICAL THERAPY | Age: 37
Setting detail: THERAPIES SERIES
Discharge: HOME | End: 2022-12-27
Payer: COMMERCIAL

## 2022-12-27 PROCEDURE — 97110 THERAPEUTIC EXERCISES: CPT

## 2022-12-27 PROCEDURE — 97162 PT EVAL MOD COMPLEX 30 MIN: CPT

## 2022-12-27 ASSESSMENT — PAIN DESCRIPTION - DIRECTION: RADIATING_TOWARDS: DENIES RADIATING PAIN

## 2022-12-27 ASSESSMENT — PAIN DESCRIPTION - DESCRIPTORS: DESCRIPTORS: SHARP;ACHING

## 2022-12-27 ASSESSMENT — PAIN DESCRIPTION - PAIN TYPE: TYPE: SURGICAL PAIN

## 2022-12-27 ASSESSMENT — PAIN DESCRIPTION - FREQUENCY: FREQUENCY: CONTINUOUS

## 2022-12-27 ASSESSMENT — PAIN DESCRIPTION - ORIENTATION: ORIENTATION: RIGHT;ANTERIOR;OUTER;POSTERIOR

## 2022-12-27 ASSESSMENT — PAIN SCALES - GENERAL: PAINLEVEL_OUTOF10: 1

## 2022-12-27 ASSESSMENT — PAIN DESCRIPTION - LOCATION: LOCATION: SHOULDER

## 2022-12-27 NOTE — PROGRESS NOTES
[address] [facility]                                  Ph: [phone]  Fax: [phone]      [] Certification  [] Recertification [x]  Plan of Care  [] Progress Note [] Discharge      Referring Provider: Bev Ha MD Referring Provider (secondary): none   From:  Toshia Howell, PT  Patient: Yue Roche (42 y.o. male) : 1985 Date: 2022  Medical Diagnosis: Superior glenoid labrum lesion of unspecified shoulder, initial encounter [S43.439A]  Pain in unspecified shoulder [M25.519]  Encounter for procedure for purposes other than remedying health state, unspecified [Z41.9] labral tear of shoulder; shoulder pain; surgery elective Diagnosis: labral tear of shoulder; shoulder pain; surgery elective   Treatment Diagnosis: R shoulder A/PROM, decreased R UE strength, and increased R shoulder pain which is preventing pt from returning to work at [employer] of Care/Certification Expiration Date: :     Progress Report Period from:  2022  to 2022    Visits to Date: 1 No Show: 0 Cancelled Appts: 0    OBJECTIVE:   Short Term Goals - Time Frame for Short Term Goals: 4 wks    Goals Current/Discharge status  Status   Short Term Goal 1: Pt will demonstrate improved A/PROM >/= 5-10 deg for return to normalized motion. General AROM UE: Left WNL (R elbow, wrist, hand WFL)      AROM RUE (degrees)  R Shoulder Flexion (0-180): 85  R Shoulder Extension (0-45): 45  R Shoulder ABduction (0-180): 55  R Shoulder Int Rotation  (0-70): buttock  R Shoulder Ext Rotation (0-90): occiput   General PROM UE: Left WNL      PROM RUE (degrees)  R Shoulder Flex  (0-180): 165  R Shoulder ABduction (0-180): 130  R Shoulder Int Rotation  (0-70): 45  R Shoulder Ext Rotation  (0-90): 70     New   Short Term Goal 2: Pt will demosntrate improved R UE/shoulder strength >/= 4-/5 for return to lifting and return to work without restrictions.     Strength RUE  R Shoulder Flexion: 2-/5  R Shoulder Extension: 3-/5  R Shoulder ABduction: 2-/5  R Shoulder Internal Rotation: 2-/5  R Shoulder External Rotation: 2-/5  R Elbow Flexion: 3-/5  R Elbow Extension: 3-/5   New     Long Term Goals - Time Frame for Long Term Goals : 8 wks  Goals Current/ Discharge status Status   Long Term Goal 1: Pt will demonstrate indep and 100% compliance with HEP for self management of symptoms and return to work. HEP initiated New   Long Term Goal 2: Pt will demonstrate improved score on UEFS >/= 50/80 for improved quality of life. UEFS: 16/80   New   Long Term Goal 3: Pt will demonstrate decreased R shoulder pain </= 2/10 during use of R UE. Pain Screening  Patient Currently in Pain: Yes  Pain Assessment: 0-10  Pain Level: 1  Best Pain Level: 1  Worst Pain Level: 9  Pain Type: Surgical pain  Pain Location: Shoulder  Pain Orientation: Right, Anterior, Outer, Posterior  Pain Radiating Towards: denies radiating pain  Pain Descriptors: Sharp, Aching  Pain Frequency: Continuous  Pain Management/Relieving Factors: Ice (takes pain meds as needed)   New     Body Structures, Functions, Activity Limitations Requiring Skilled Therapeutic Intervention: Decreased functional mobility , Decreased ROM, Decreased strength, Decreased endurance, Increased pain  Assessment: Pt is 42 y.o. s/p R SAD and labral debridgement on 12/8. At time of eval, pt with lifting restrictions but ok for A/PROM R shoulder. Pt exhibits impairments including decreased R shoulder A/PROM, decreased R UE strength, and increased R shoulder pain which is preventing pt from returning to work at [employer]. Pt requires continued PT to address these impairments, progress strength and ROM, and provide pt with HEP for self management of symptoms within MD precautions for returnt to full functional use of R UE at work and at home. Therapy Prognosis: Good      PT Education: Goals;PT Role;Plan of Care;Evaluative findings; Insurance;Home Exercise Program    PLAN: [x] Evaluate and Treat  Frequency/Duration:  Plan Frequency: 2xs/wk  Plan weeks: 8 wks  Current Treatment Recommendations: Strengthening, ROM, Functional mobility training, Endurance training, Neuromuscular re-education, Manual, Pain management, Return to work related activity, Home exercise program, Safety education & training, Patient/Caregiver education & training, Equipment evaluation, education, & procurement, Modalities, Positioning, Therapeutic activities  Additional Comments: [facility], PT     Precautions: Other position/activity restrictions: R shoulder may star active/passive ROM as of 12/14; no resistance until 12/29                  Patient Status:[x] Continue/ Initiate plan of Care    [] Discharge PT. Recommend pt continue with HEP. [] Additional visits requested, Please re-certify for additional visits:    [] Hold         Signature: Electronically signed by Cori Ramsay PT on 12/27/22 at 1:55 PM EST      If you have any questions or concerns, please don't hesitate to call. Thank you for your referral.    I have reviewed this plan of care and certify a need for medically necessary rehabilitation services.     Physician Signature:__________________________________________________________  Date:  Please sign and return

## 2022-12-27 NOTE — PROGRESS NOTES
[facility]) Physical Therapy-  [place]  PHYSICAL THERAPY EVALUATION    Physical Therapy: Initial Evaluation    Patient: Emiliano Del Castillo (63 y.o.     male)   Examination Date: 2022   :  1985 ;    Confirmed: Yes MRN: [id number]  CSN: [id number]   Insurance: Payor: [employer] / Plan: [address] [place] [address] / Product Type: *No Product type* /   Insurance ID: [id number] - ([facility]) Secondary Insurance (if applicable):    Referring Physician: Harsh Maddox MD  none    Visits to Date/Visits Approved:  (25 then auth; 60 per sridhar yr combined PT/OT/ST)    No Show/Cancelled Appts: 0  0     Medical Diagnosis: Superior glenoid labrum lesion of unspecified shoulder, initial encounter [S43.439A]  Pain in unspecified shoulder [M25.519]  Encounter for procedure for purposes other than remedying health state, unspecified [Z41.9] labral tear of shoulder; shoulder pain; surgery elective  Diagnosis: labral tear of shoulder; shoulder pain; surgery elective   Treatment Diagnosis: R shoulder A/PROM, decreased R UE strength, and increased R shoulder pain which is preventing pt from returning to work at [employer]   Patient Assessed for Rehabilitation Services: Yes       Medical History:     Past Medical History:   Diagnosis Date    Asthma     Dermatitis     Fatty liver     Gastroesophageal reflux disease with hiatal hernia     Hypertension     Obesity (BMI 30-39. 9)      Surgical History:   Past Surgical History:   Procedure Laterality Date    COLONOSCOPY      ENDOSCOPY, COLON, DIAGNOSTIC      LYMPH NODE BIOPSY      surgery due to cat scratch disease age 1    SHOULDER ARTHROSCOPY Right 2022    RIGHT SHOULDER ARTHROSCOPY ROTATOR CUFF REPAIR, ARTHROSCOPY SUBACROMIAL DECOMPRESSION DEBRIDEMENT [facility]         Medications:   Current Outpatient Medications:     ciprofloxacin (CIPRO) 500 MG tablet, Take 1 tablet by mouth 2 times daily for 10 days, Disp: 20 tablet, Rfl: 0    albuterol sulfate HFA (PROVENTIL;VENTOLIN;PROAIR) 108 (90 Base) MCG/ACT inhaler, INHALE 2 PUFFS BY MOUTH EVERY 6 HOURS AS NEEDED FOR WHEEZE, Disp: 6.7 each, Rfl: 5    hydrOXYzine pamoate (VISTARIL) 25 MG capsule, TAKE 1 CAPSULE BY MOUTH 3 TIMES DAILY AS NEEDED FOR ANXIETY., Disp: 30 capsule, Rfl: 2    HYDROcodone-acetaminophen (NORCO) 5-325 MG per tablet, TAKE 1 TABLET BY MOUTH TWICE A DAY AS NEEDED, Disp: , Rfl:     naproxen (NAPROSYN) 500 MG tablet, TAKE 1 TABLET BY MOUTH TWICE A DAY AS NEEDED FOR PAIN, Disp: 60 tablet, Rfl: 0    lamoTRIgine (LAMICTAL XR) 200 MG TB24 extended release tablet, TAKE 1 TABLET BY MOUTH EVERY DAY, Disp: 90 tablet, Rfl: 1    tadalafil (CIALIS) 5 MG tablet, Take 1 tablet by mouth daily, Disp: 90 tablet, Rfl: 1    testosterone cypionate (DEPOTESTOTERONE CYPIONATE) 200 MG/ML injection, Inject 1 mL into the muscle every 14 days for 180 days. , Disp: 6 mL, Rfl: 1    omeprazole (PRILOSEC) 40 MG delayed release capsule, TAKE 1 CAPSULE BY MOUTH EVERY DAY, Disp: 90 capsule, Rfl: 2    metoprolol (LOPRESSOR) 100 MG tablet, TAKE 1 TABLET BY MOUTH TWICE A DAY, Disp: 180 tablet, Rfl: 1    fluticasone-salmeterol (ADVAIR DISKUS) 100-50 MCG/DOSE diskus inhaler, Inhale 1 puff into the lungs every 12 hours, Disp: 180 each, Rfl: 1    albuterol (PROVENTIL) (2.5 MG/3ML) 0.083% nebulizer solution, Take 3 mLs by nebulization every 6 hours as needed for Wheezing, Disp: 120 each, Rfl: 3    EPINEPHrine (EPIPEN 2-AUGUSTUS) 0.3 MG/0.3ML SOAJ injection, Inject 0.3 mLs into the muscle once as needed (allergic reaction/bee sting) Use as directed for allergic reaction Dispense 1 two pack (epi pen), Disp: 1 each, Rfl: 0  Allergies: Amlodipine, Bee venom, Compazine [prochlorperazine maleate], and Lisinopril      SUBJECTIVE EXAMINATION     History obtained from[de-identified] Patient,      Family/Caregiver Present: No    Subjective History:  Onset Date: 12/08/22  Subjective: Pt to PT due to SAD and extensive labral debridement R shoulder on 12/8. Pt reports not wearing the sling at this time. Pt states is completing pendulums 2xs/day.   Additional Pertinent Hx (if applicable): OA, chronic pain, asthma   Comment: RTD next week     Pain Screening    Pain Screening  Patient Currently in Pain: Yes  Pain Assessment: 0-10  Pain Level: 1  Best Pain Level: 1  Worst Pain Level: 9  Pain Type: Surgical pain  Pain Location: Shoulder  Pain Orientation: Right, Anterior, Outer, Posterior  Pain Radiating Towards: denies radiating pain  Pain Descriptors: Sharp, Aching  Pain Frequency: Continuous  Pain Management/Relieving Factors: Ice (takes pain meds as needed)    Functional Status    Social History:    Social History  Lives With: Spouse  Type of Home: House  Home Layout: Two level  Home Access: Stairs to enter with rails  Entrance Stairs - Rails: Left  Entrance Stairs - Number of Steps: 4    Occupation/Interests:   Occupation: Full time employment  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    Prior Level of Function:     Independent     Current Level of Function:   ADL Assistance: Independent  Homemaking Assistance: Independent  Homemaking Responsibilities: Yes  Ambulation Assistance: Independent  Transfer Assistance: Independent  Active : Yes    Dominant Hand: : Right    OBJECTIVE EXAMINATION     Restrictions:         Position Activity Restriction  Other position/activity restrictions: R shoulder may star active/passive ROM as of 12/14; no resistance until 12/29    Regional Screen:    Cervical Screen: [facility]  Elbow/Forearm Screen: [facility]    Mobility:   Ambulation  Surface: Level tile  Device: No Device  Assistance: Independent  Gait Deviations: None  Stairs/Curb  Stairs?: No    Neuro Screen: Sensation  Overall Sensation Status: Impaired (R fingertips)    Left AROM  Right AROM      General AROM UE: Left WNL (R elbow, wrist, hand WFL)    General AROM UE: Left WNL (R elbow, wrist, hand WFL)  AROM RUE (degrees)  R Shoulder Flexion (0-180): 85  R Shoulder Extension (0-45): 45  R Shoulder ABduction (0-180): 55  R Shoulder Int Rotation  (0-70): buttock  R Shoulder Ext Rotation (0-90): occiput   General AROM UE: Left WNL (R elbow, wrist, hand WFL)    Left PROM  Right PROM      General PROM UE: Left WNL    General PROM UE: Left WNL  PROM RUE (degrees)  R Shoulder Flex  (0-180): 165  R Shoulder ABduction (0-180): 130  R Shoulder Int Rotation  (0-70): 45  R Shoulder Ext Rotation  (0-90): 70   General PROM UE: Left WNL    Left Strength  Right Strength      General Strength Testing UE: Left WNL (resisted MMTing not completed due to no resistance until 12/29 R UE)    General Strength Testing UE: Left WNL (resisted MMTing not completed due to no resistance until 12/29 R UE)  Strength RUE  R Shoulder Flexion: 2-/5  R Shoulder Extension: 3-/5  R Shoulder ABduction: 2-/5  R Shoulder Internal Rotation: 2-/5  R Shoulder External Rotation: 2-/5  R Elbow Flexion: 3-/5  R Elbow Extension: 3-/5     Outcomes Score:  Exam: UEFS: 16/80    Treatment:  Exercises:   Exercises  Exercise 1: table slides flex, scaption, ER X 5  Exercise 2: pulley X 1 min  Exercise 3: trialed cane*- pt unable to complete AAROM without pain  Exercise 4: PROM*  Exercise 8: HEP: table slide     Modalities:  Modalities:  (ice*)     Manual:  Manual Therapy  Joint Mobilization: *  Soft Tissue Mobilizaton: *  Other: kinesio tape*- pt denies allergy to adhesive  *Indicates exercise,modality, or manual techniques to be initiated when appropriate       ASSESSMENT     Impression: Assessment: Pt is 42 y.o. s/p R SAD and labral debridgement on 12/8. At time of eval, pt with lifting restrictions but ok for A/PROM R shoulder. Pt exhibits impairments including decreased R shoulder A/PROM, decreased R UE strength, and increased R shoulder pain which is preventing pt from returning to work at [employer].   Pt requires continued PT to address these impairments, progress strength and ROM, and provide pt with HEP for self management of symptoms within MD precautions for returnt to full functional use of R UE at work and at home. Body Structures, Functions, Activity Limitations Requiring Skilled Therapeutic Intervention: Decreased functional mobility , Decreased ROM, Decreased strength, Decreased endurance, Increased pain    Statement of Medical Necessity: Physical Therapy is both indicated and medically necessary as outlined in the POC to increase the likelihood of meeting the functionally related goals stated below. Patient's Activity Tolerance: Patient limited by pain      Patient's rehabilitation potential/prognosis is considered to be: Good    Factors which may impact rehabilitation potential include: None  Measures taken to address barrier(s): N/A  Patient Education: Goals, PT Role, Plan of Care, Evaluative findings, Insurance, Home Exercise Program      GOALS   Patient Goal(s): Patient Goals : \"throw a baseball\"    Short Term Goals Completed by 4 wks Goal Status   Pt will demonstrate improved A/PROM >/= 5-10 deg for return to normalized motion. New   Pt will demosntrate improved R UE/shoulder strength >/= 4-/5 for return to lifting and return to work without restrictions. New     Long Term Goals Completed by 8 wks Goal Status   LTG 1 Pt will demonstrate indep and 100% compliance with HEP for self management of symptoms and return to work. New   LTG 2 Pt will demonstrate improved score on UEFS >/= 50/80 for improved quality of life. New   LTG 3 Pt will demonstrate decreased R shoulder pain </= 2/10 during use of R UE.  New        TREATMENT PLAN     Treatment may include any combination of the following: Strengthening, ROM, Functional mobility training, Endurance training, Neuromuscular re-education, Manual, Pain management, Return to work related activity, Home exercise program, Safety education & training, Patient/Caregiver education & training, Equipment evaluation, education, & procurement, Modalities, Positioning, Therapeutic activities     Frequency / Duration:  Patient to be seen 2xs/wk times per week for 8 wks weeks  Plan Comment:    transfer PT POC to [facility], PT          Eval Complexity:   Decision Making: Medium Complexity  History: Personal Factors and/or Comorbidities Impacting POC: Medium  History: OA, chronic pain, asthma  Examination of body system(s) including body structures and functions, activity limitations, and/or participation restrictions: High  Exam: UEFS: 16/80  Clinical Presentation: Medium  Clinical Presentation: evloving    POST-PAIN     Pain Rating (0-10 pain scale):  4 /10  Location and pain description same as pre-treatment unless indicated. Action: [] NA  [] Call Physician  [x] Perform HEP  [x] Meds as prescribed    Evaluation and patient rights have been reviewed and patient agrees with plan of care. Yes  [x]  No  []   Explain:     [place] Fall Risk Assessment  Risk Factor Scale  Score   History of Falls [] Yes  [x] No 25  0 0   Secondary Diagnosis [] Yes  [x] No 15  0 0   Ambulatory Aid [] Furniture  [] Crutches/cane/walker  [x] None/bedrest/wheelchair/nurse 30  15  0 0   IV/Heparin Lock [] Yes  [x] No 20  0 0   Gait/Transferring [] Impaired  [] Weak  [x] Normal/bedrest/immobile 20  10  0 0   Mental Status [] Forgets limitations  [x] Oriented to own ability 15  0 0      Total:0     Based on the Assessment score: check the appropriate box.   [x]  No intervention needed   Low =   Score of 0-24  []  Use standard prevention interventions Moderate =  Score of 24-44   [] Discuss fall prevention strategies   [] Indicate moderate falls risk on eval  []  Use high risk prevention interventions High = Score of 45 and higher   [] Discuss fall prevention strategies   [] Provide supervision during treatment time      Minutes:  PT Individual Minutes  Time In: 1305  Time Out: 1355  Minutes: 50  Timed Code Treatment Minutes: 12 Minutes  Procedure Minutes: eval X 38 min     Timed Activity Minutes Units   Ther Ex 12 1     Electronically signed by Toby Ferris PT on 12/27/22 at 1:53 PM EST

## 2022-12-29 ENCOUNTER — HOSPITAL ENCOUNTER (OUTPATIENT)
Dept: PHYSICAL THERAPY | Age: 37
Setting detail: THERAPIES SERIES
Discharge: HOME | End: 2022-12-29
Payer: COMMERCIAL

## 2022-12-29 PROCEDURE — 97110 THERAPEUTIC EXERCISES: CPT

## 2022-12-29 PROCEDURE — 97140 MANUAL THERAPY 1/> REGIONS: CPT

## 2022-12-29 ASSESSMENT — PAIN SCALES - GENERAL: PAINLEVEL_OUTOF10: 2

## 2022-12-29 ASSESSMENT — PAIN DESCRIPTION - PAIN TYPE: TYPE: SURGICAL PAIN

## 2022-12-29 ASSESSMENT — PAIN DESCRIPTION - LOCATION: LOCATION: SHOULDER

## 2022-12-29 ASSESSMENT — PAIN DESCRIPTION - DESCRIPTORS: DESCRIPTORS: SORE

## 2022-12-29 ASSESSMENT — PAIN DESCRIPTION - ORIENTATION: ORIENTATION: RIGHT

## 2022-12-29 ASSESSMENT — PAIN DESCRIPTION - FREQUENCY: FREQUENCY: CONTINUOUS

## 2022-12-29 NOTE — PROGRESS NOTES
[address]  Outpatient Physical Therapy    Treatment Note        Date: 2022  Patient: Fatou Mccullough  : 1985   Confirmed: Yes  MRN: [id number]  Referring Provider: Karson Balbuena MD   Secondary Referring Provider (If applicable): none   Medical Diagnosis: Superior glenoid labrum lesion of unspecified shoulder, initial encounter [S43.439A]  Pain in unspecified shoulder [M25.519]  Encounter for procedure for purposes other than remedying health state, unspecified [Z41.9]    Treatment Diagnosis: R shoulder A/PROM, decreased R UE strength, and increased R shoulder pain which is preventing pt from returning to work at [address]:  Insurance: Payor: [employer] / Plan: [address] [place] [address] / Product Type: *No Product type* /   PT Visit Information  Onset Date: 22  PT Insurance Information: BCBS  Total # of Visits Approved: 25 (25 then auth; 60 per sridhar yr combined PT/OT/ST)  Total # of Visits to Date: 1  No Show: 0  Canceled Appointment: 0  Progress Note Counter:   Referring Provider (secondary): none    Subjective Information:  Subjective: Pt presents to appt reporting \"mild\" 2/10 pain at rest stating \"I haven't been wearing the sling, it was more uncomfortable weating it than not. \" Pt has been compliant to exs provided at [facility].  HEP Compliance:  [x] Good [] Fair [] Poor [] Reports not doing due to:    Pain Screening  Patient Currently in Pain: Yes  Pain Assessment: 0-10  Pain Level: 2  Pain Type: Surgical pain  Pain Location: Shoulder  Pain Orientation: Right  Pain Descriptors: Sore  Pain Frequency: Continuous    Treatment:  Exercises:  Exercises  Exercise 1: table slides flex, scaption, ER X 5  Exercise 2: pulley X 1 min  Exercise 3: Cane: seated ER 5\"x1-, trialed supine wand flex- unable to complete without pain  Exercise 4: PROM- see manual  Exercise 5: S/L ER x10 (ROM within meng)  Exercise 8: HEP: wand ER, S/L ER     Manual:   Manual Therapy  Soft Tissue Mobilizaton: ant/post shldr  Other: PROM- all planes     Modalities:  Pt declined     Objective Measures:     Strength: [x] NT  [] MMT completed:     ROM: [] NT  [x] ROM measurements:     PROM RUE (degrees)  R Shoulder Flex  (0-180): 110 deg  R Shoulder ABduction (0-180): 55 deg  R Shoulder Int Rotation  (0-70): 50 (at 45 deg ABD)  R Shoulder Ext Rotation  (0-90): 65 deg (at 45 deg ABD)      Assessment: Body Structures, Functions, Activity Limitations Requiring Skilled Therapeutic Intervention: Decreased functional mobility , Decreased ROM, Decreased strength, Decreased endurance, Increased pain  Assessment: Initiated PT program per POC w/ primary focus on improving Rt shldr ROM s/p SAD and labral debridement. Pt remains very gaurded during PROM  w/ pt TTP at [facility]. ROM noted to be decreased since eval D/T pain limitations though good meng to all A/AAROM exs given cues needed to reduce range as needed to avoid further inc in pain. HEP was provided to include additional ROM exs. Pt declining modalities post tx D/T icing frequently at home. Treatment Diagnosis: R shoulder A/PROM, decreased R UE strength, and increased R shoulder pain which is preventing pt from returning to work at [address] [place] [address] Prognosis: Good     Post-Pain Assessment:       Pain Rating (0-10 pain scale):   2-3/10   Location and pain description same as pre-treatment unless indicated. Action: [] NA   [x] Perform HEP  [] Meds as prescribed  [x] Modalities as prescribed   [] Call Physician     GOALS   Patient Goal(s): Patient Goals : \"throw a baseball\"    Short Term Goals Completed by 4 wks Goal Status   STG 1 Pt will demonstrate improved A/PROM >/= 5-10 deg for return to normalized motion. In progress   STG 2 Pt will demosntrate improved R UE/shoulder strength >/= 4-/5 for return to lifting and return to work without restrictions.  In progress     Long Term Goals Completed by 8 wks Goal Status   LTG 1 Pt will demonstrate indep and 100% compliance with HEP for self management of symptoms and return to work. In progress   LTG 2 Pt will demonstrate improved score on UEFS >/= 50/80 for improved quality of life. In progress   LTG 3 Pt will demonstrate decreased R shoulder pain </= 2/10 during use of R UE. In progress     Plan:  Frequency/Duration:  Plan  Plan Frequency: 2xs/wk  Plan weeks: 8 wks  Current Treatment Recommendations: Strengthening, ROM, Functional mobility training, Endurance training, Neuromuscular re-education, Manual, Pain management, Return to work related activity, Home exercise program, Safety education & training, Patient/Caregiver education & training, Equipment evaluation, education, & procurement, Modalities, Positioning, Therapeutic activities  Additional Comments: transfer PT POC to [facility], PT  Pt to continue current HEP. See objective section for any therapeutic exercise changes, additions or modifications this date.     Therapy Time:      PT Individual Minutes  Time In: 8449  Time Out: 1340  Minutes: 42  Timed Code Treatment Minutes: 42 Minutes  Procedure Minutes: N/A  Timed Activity Minutes Units   Ther Ex 32 2   Manual  10 1     Electronically signed by Dasha Gonzalez PTA on 12/29/22 at 5:39 PM EST

## 2023-01-03 ENCOUNTER — HOSPITAL ENCOUNTER (OUTPATIENT)
Dept: PHYSICAL THERAPY | Age: 38
Setting detail: THERAPIES SERIES
Discharge: HOME | End: 2023-01-03
Payer: COMMERCIAL

## 2023-01-03 PROCEDURE — 97110 THERAPEUTIC EXERCISES: CPT

## 2023-01-03 PROCEDURE — 97140 MANUAL THERAPY 1/> REGIONS: CPT

## 2023-01-03 ASSESSMENT — PAIN DESCRIPTION - PAIN TYPE: TYPE: SURGICAL PAIN

## 2023-01-03 ASSESSMENT — PAIN DESCRIPTION - ORIENTATION: ORIENTATION: RIGHT

## 2023-01-03 ASSESSMENT — PAIN DESCRIPTION - LOCATION: LOCATION: SHOULDER

## 2023-01-03 ASSESSMENT — PAIN SCALES - GENERAL: PAINLEVEL_OUTOF10: 2

## 2023-01-03 ASSESSMENT — PAIN DESCRIPTION - FREQUENCY: FREQUENCY: CONTINUOUS

## 2023-01-03 NOTE — PROGRESS NOTES
[address] [facility]  Outpatient Physical Therapy    Treatment Note        Date: 1/3/2023  Patient: Jason Pettit  : 1985   Confirmed: Yes  MRN: [id number]  Referring Provider: Virgie Taylor MD   Secondary Referring Provider (If applicable): none   Medical Diagnosis: Superior glenoid labrum lesion of unspecified shoulder, initial encounter [S43.439A]  Pain in unspecified shoulder [M25.519]  Encounter for procedure for purposes other than remedying health state, unspecified [Z41.9]    Treatment Diagnosis: R shoulder A/PROM, decreased R UE strength, and increased R shoulder pain which is preventing pt from returning to work at [address]:  Insurance: Payor: [employer] / Plan: [address] [place] [address] / Product Type: *No Product type* /   PT Visit Information  Onset Date: 22  PT Insurance Information: BCBS  Total # of Visits Approved: 25 (25 then auth; 60 per sridhar yr combined PT/OT/ST)  Total # of Visits to Date: 3  No Show: 0  Canceled Appointment: 0  Progress Note Counter: 3/16  Referring Provider (secondary): none    Subjective Information:  Subjective: Pt states \"I was pretty sore after the last time. \" Pt reports waking up on Rt side this morning therefore, more sore.   HEP Compliance:  [] Good [] Fair [] Poor [] Reports not doing due to:    Pain Screening  Patient Currently in Pain: Yes  Pain Assessment: 0-10  Pain Level: 2  Pain Type: Surgical pain  Pain Location: Shoulder  Pain Orientation: Right  Pain Frequency: Continuous    Treatment:  Exercises:  Exercises  Exercise 1: table slides flex, scaption, ER X 5  Exercise 2: pulley X 1 min  Exercise 3: Cane: seated ER 5\"x1-, trialed supine wand flex- unable to complete without pain  Exercise 4: PROM- see manual  Exercise 5: S/L ER, ABD and flex x10 (ROM within meng)  Exercise 6: 6 way shldr isometrics 5\"x5 \" ea  Exercise 8: HEP: shldr isometrics     Manual:   Manual Therapy  Soft Tissue Mobilizaton: ant/post shldr  Other: PROM- all planes     Modalities:  Pt will ice at home     Objective Measures:      Strength: [x] NT  [] MMT completed:     ROM: [x] NT  [] ROM measurements:     Assessment: Body Structures, Functions, Activity Limitations Requiring Skilled Therapeutic Intervention: Decreased functional mobility , Decreased ROM, Decreased strength, Decreased endurance, Increased pain  Assessment: Pt has been cleared to begin resistance on 12/29/22 according to MD order. Continued to progress AROM in reduce gravity positioning in flex, ER, ABD as well as gentle isometrics for gentle GHJ strengthening. Good meng to exs additions given cues needed to reduce range/intensity to avoid inc pain. Pain cont's to be limit shldr flex/ABD PROM >90 deg flex/ABD during manual. Pt expected to F/U w/ orthopedic tomorrow. Treatment Diagnosis: R shoulder A/PROM, decreased R UE strength, and increased R shoulder pain which is preventing pt from returning to work at [address] [place] [address] Prognosis: Good     Post-Pain Assessment:       Pain Rating (0-10 pain scale):   2/10   Location and pain description same as pre-treatment unless indicated. Action: [] NA   [x] Perform HEP  [] Meds as prescribed  [x] Modalities as prescribed   [] Call Physician     GOALS   Patient Goal(s): Patient Goals : \"throw a baseball\"    Short Term Goals Completed by 4 wks Goal Status   STG 1 Pt will demonstrate improved A/PROM >/= 5-10 deg for return to normalized motion. In progress   STG 2 Pt will demosntrate improved R UE/shoulder strength >/= 4-/5 for return to lifting and return to work without restrictions. In progress     Long Term Goals Completed by 8 wks Goal Status   LTG 1 Pt will demonstrate indep and 100% compliance with HEP for self management of symptoms and return to work. In progress   LTG 2 Pt will demonstrate improved score on UEFS >/= 50/80 for improved quality of life.  In progress   LTG 3 Pt will demonstrate decreased R shoulder pain </= 2/10 during use of R UE. In progress     Plan:  Frequency/Duration:  Plan  Plan Frequency: 2xs/wk  Plan weeks: 8 wks  Current Treatment Recommendations: Strengthening, ROM, Functional mobility training, Endurance training, Neuromuscular re-education, Manual, Pain management, Return to work related activity, Home exercise program, Safety education & training, Patient/Caregiver education & training, Equipment evaluation, education, & procurement, Modalities, Positioning, Therapeutic activities  Additional Comments: transfer PT POC to [facility], PT  Pt to continue current HEP. See objective section for any therapeutic exercise changes, additions or modifications this date.     Therapy Time:      PT Individual Minutes  Time In: 0932  Time Out: 7644  Minutes: 50  Timed Code Treatment Minutes: 50 Minutes  Procedure Minutes: N/A   Timed Activity Minutes Units   Ther Ex 35 2   Manual  15 1     Electronically signed by Tate Ren PTA on 1/3/23 at 10:27 AM EST

## 2023-01-05 ENCOUNTER — HOSPITAL ENCOUNTER (OUTPATIENT)
Dept: PHYSICAL THERAPY | Age: 38
Setting detail: THERAPIES SERIES
Discharge: HOME | End: 2023-01-05
Payer: COMMERCIAL

## 2023-01-05 PROCEDURE — 97140 MANUAL THERAPY 1/> REGIONS: CPT

## 2023-01-05 PROCEDURE — 97110 THERAPEUTIC EXERCISES: CPT

## 2023-01-05 ASSESSMENT — PAIN DESCRIPTION - LOCATION: LOCATION: SHOULDER

## 2023-01-05 ASSESSMENT — PAIN SCALES - GENERAL: PAINLEVEL_OUTOF10: 2

## 2023-01-05 NOTE — PROGRESS NOTES
[address] [facility]  Outpatient Physical Therapy    Treatment Note        Date: 2023  Patient: Fredy Walters  : 1985   Confirmed: Yes  MRN: [id number]  Referring Provider: Arabella De La Paz MD   Secondary Referring Provider (If applicable): none   Medical Diagnosis: Superior glenoid labrum lesion of unspecified shoulder, initial encounter [S43.439A]  Pain in unspecified shoulder [M25.519]  Encounter for procedure for purposes other than remedying health state, unspecified [Z41.9] labral tear of shoulder; shoulder pain; surgery elective  Treatment Diagnosis: R shoulder A/PROM, decreased R UE strength, and increased R shoulder pain which is preventing pt from returning to work at [address]:  Insurance: Payor: Danny Latimer / Plan: [address] [place] [address] / Product Type: *No Product type* /   PT Visit Information  Onset Date: 22  PT Insurance Information: BCBS  Total # of Visits Approved: 25 (25 then auth; 60 per sridhar yr combined PT/OT/ST)  Total # of Visits to Date: 4  No Show: 0  Canceled Appointment: 0  Progress Note Counter:   Referring Provider (secondary): none    Subjective Information:  Subjective: Pt states \"ups and downs\". Pt states saw  this week. Pleased with progression. No changes. Returns to doctor in three weeks.   HEP Compliance:  [x] Good [] Fair [] Poor [] Reports not doing due to:    Pain Screening  Pain Level: 2  Worst Pain Level: 7  Pain Location: Shoulder    Treatment:  Exercises:  Exercises  Exercise 1: pt states doing table slides at home  Exercise 2: pulley X 3 min  Exercise 3: wand exs supine: flex mid range only x10, cane push ups  Exercise 4: PROM- see manual  Exercise 7: supine shoulder ABC  Exercise 8: shldr shrugs, rolls, retraction x10  Exercise 20: HEP: shrugs, rolls, retraction, ABC, supine wand       Manual:   Manual Therapy  Soft Tissue Mobilizaton: ant/post shldr  Other: PROM- all planes - heavy encouragement to breathe and relax muscles during all ROM     *Indicates exercise, modality, or manual techniques to be initiated when appropriate    Objective Measures:      ROM: [] NT  [] ROM measurements:   PROM RUE (degrees)  R Shoulder Flex  (0-180): 168 supine after PROM  R Shoulder ABduction (0-180): 140  R Shoulder Ext Rotation  (0-90): 88      Assessment: Body Structures, Functions, Activity Limitations Requiring Skilled Therapeutic Intervention: Decreased functional mobility , Decreased ROM, Decreased strength, Decreased endurance, Increased pain  Assessment: Pt with continued high guarding with most AROM. Improved PROM measurements this date. Requires VCs to relax and not hold breath. Initiated supine shoulder AROM and modified wand exs with focus in mid range to improve tolerance. Will continue follow protocol and POC. Treatment Diagnosis: R shoulder A/PROM, decreased R UE strength, and increased R shoulder pain which is preventing pt from returning to work at [address]:       Pain Rating (0-10 pain scale):   0/10   Location and pain description same as pre-treatment unless indicated. Action: [x] NA   [] Perform HEP  [] Meds as prescribed  [] Modalities as prescribed   [] Call Physician     GOALS   Patient Goal(s): Patient Goals : \"throw a baseball\"    Short Term Goals Completed by 4 wks Goal Status   STG 1 Pt will demonstrate improved A/PROM >/= 5-10 deg for return to normalized motion. In progress   STG 2 Pt will demosntrate improved R UE/shoulder strength >/= 4-/5 for return to lifting and return to work without restrictions. In progress     Long Term Goals Completed by 8 wks Goal Status   LTG 1 Pt will demonstrate indep and 100% compliance with HEP for self management of symptoms and return to work. In progress   LTG 2 Pt will demonstrate improved score on UEFS >/= 50/80 for improved quality of life.  In progress   LTG 3 Pt will demonstrate decreased R shoulder pain </= 2/10 during use of R UE. In progress     Plan:  Frequency/Duration:  Plan  Plan Frequency: 2xs/wk  Plan weeks: 8 wks  Current Treatment Recommendations: Strengthening, ROM, Functional mobility training, Endurance training, Neuromuscular re-education, Manual, Pain management, Return to work related activity, Home exercise program, Safety education & training, Patient/Caregiver education & training, Equipment evaluation, education, & procurement, Modalities, Positioning, Therapeutic activities  Additional Comments: transfer PT POC to [facility] beds at the [facility], PT  Pt to continue current HEP. See objective section for any therapeutic exercise changes, additions or modifications this date.     Therapy Time:      PT Individual Minutes  Time In: 3310  Time Out: 5120  Minutes: 45  Timed Code Treatment Minutes: 45 Minutes  Procedure Minutes:0    Timed Activity Minutes Units   Ther Ex 33 2   Manual  12 1     Electronically signed by Beto Marques PT on 1/5/23 at 12:29 PM EST

## 2023-01-10 ENCOUNTER — HOSPITAL ENCOUNTER (OUTPATIENT)
Dept: PHYSICAL THERAPY | Age: 38
Setting detail: THERAPIES SERIES
Discharge: HOME | End: 2023-01-10
Payer: COMMERCIAL

## 2023-01-10 PROCEDURE — 97110 THERAPEUTIC EXERCISES: CPT

## 2023-01-10 PROCEDURE — 97140 MANUAL THERAPY 1/> REGIONS: CPT

## 2023-01-10 ASSESSMENT — PAIN SCALES - GENERAL: PAINLEVEL_OUTOF10: 4

## 2023-01-10 ASSESSMENT — PAIN DESCRIPTION - LOCATION: LOCATION: SHOULDER

## 2023-01-10 ASSESSMENT — PAIN DESCRIPTION - FREQUENCY: FREQUENCY: CONTINUOUS

## 2023-01-10 ASSESSMENT — PAIN DESCRIPTION - ORIENTATION: ORIENTATION: RIGHT

## 2023-01-10 ASSESSMENT — PAIN DESCRIPTION - DESCRIPTORS: DESCRIPTORS: SORE

## 2023-01-10 NOTE — PROGRESS NOTES
[address] [facility]  Outpatient Physical Therapy    Treatment Note        Date: 1/10/2023  Patient: Jany Junior  : 1985   Confirmed: Yes  MRN: [id number]  Referring Provider: Mariela Caban MD   Secondary Referring Provider (If applicable): none   Medical Diagnosis: Superior glenoid labrum lesion of unspecified shoulder, initial encounter [S43.439A]  Pain in unspecified shoulder [M25.519]  Encounter for procedure for purposes other than remedying health state, unspecified [Z41.9] labral tear of shoulder; shoulder pain; surgery elective  Treatment Diagnosis: R shoulder A/PROM, decreased R UE strength, and increased R shoulder pain which is preventing pt from returning to work at [address]:  Insurance: Payor: Valerie Cummins / Plan: [address] [place] [address] / Product Type: *No Product type* /   PT Visit Information  Onset Date: 22  PT Insurance Information: BCBS  Total # of Visits Approved: 25 (25 then auth; 60 per sridhar yr combined PT/OT/ST)  Total # of Visits to Date: 5  No Show: 0  Canceled Appointment: 0  Progress Note Counter:   Referring Provider (secondary): none    Subjective Information:  Subjective: Pt states \"I've been pretty sore since last visit but I'm glad she got me to full range. \"  HEP Compliance:  [x] Good [] Fair [] Poor [] Reports not doing due to:    Pain Screening  Patient Currently in Pain: Yes  Pain Assessment: 0-10  Pain Level: 4  Pain Location: Shoulder  Pain Orientation: Right  Pain Descriptors: Sore  Pain Frequency: Continuous    Treatment:  Exercises:  Exercises  Exercise 2: pulley X 3 min ea (flex/scap)  Exercise 3: wand exs supine: flex mid range only x10, cane push ups x10; standing ER/ scaption 5\"x10 ea  Exercise 4: PROM- see manual  Exercise 5: S/L ER, ABD and flex x10 (ROM within meng)  Exercise 6: 6 way shldr isometrics- Pt completing at home  Exercise 7: supine shoulder ABC  Exercise 8: shldr shrugs, rolls, retraction x10  Exercise 9: Finger ladder: flex L23, scap L22  Exercise 20: HEP:     Manual:   Manual Therapy  Soft Tissue Mobilizaton: ant/post shldr  Other: PROM- all planes - heavy encouragement to breathe and relax muscles during all ROM     Modalities:  Cryotherapy (CPT 11529)  Patient Position: Seated  Number Minutes Cryotherapy: 10 min  Cryotherapy location: Right, Shoulder     *Indicates exercise, modality, or manual techniques to be initiated when appropriate    Objective Measures:       Strength: [x] NT  [] MMT completed:     ROM: [] NT  [x] ROM measurements:   PROM RUE (degrees)  R Shoulder Flex  (0-180): 168 supine after PROM  R Shoulder ABduction (0-180): 155 deg  R Shoulder Ext Rotation  (0-90): 90 at 45 deg ABD      Assessment: Body Structures, Functions, Activity Limitations Requiring Skilled Therapeutic Intervention: Decreased functional mobility , Decreased ROM, Decreased strength, Decreased endurance, Increased pain  Assessment: Pt approaching full ROM in most planes during PROM upon reduced gaurding and pain this date w/ measures consistent to last therapy visit. Despite ROM improvement, pt cont's to req assistance w/ return to neutral D/T pain and ongoing weakness. Painful arcs leading into both flex and ABD ~45-90 deg. Pt cont's to request use of CP at home vs in clinic post tx. Treatment Diagnosis: R shoulder A/PROM, decreased R UE strength, and increased R shoulder pain which is preventing pt from returning to work at [address] [place] [address] Prognosis: Good     Post-Pain Assessment:       Pain Rating (0-10 pain scale):   2/10   Location and pain description same as pre-treatment unless indicated. Action: [] NA   [x] Perform HEP  [] Meds as prescribed  [x] Modalities as prescribed   [] Call Physician     GOALS   Patient Goal(s): Patient Goals : \"throw a baseball\"    Short Term Goals Completed by 4 wks Goal Status   STG 1 Pt will demonstrate improved A/PROM >/= 5-10 deg for return to normalized motion.  In progress   STG 2 Pt will demosntrate improved R UE/shoulder strength >/= 4-/5 for return to lifting and return to work without restrictions. In progress     Long Term Goals Completed by 8 wks Goal Status   LTG 1 Pt will demonstrate indep and 100% compliance with HEP for self management of symptoms and return to work. In progress   LTG 2 Pt will demonstrate improved score on UEFS >/= 50/80 for improved quality of life. In progress   LTG 3 Pt will demonstrate decreased R shoulder pain </= 2/10 during use of R UE. In progress     Plan:  Frequency/Duration:  Plan  Plan Frequency: 2xs/wk  Plan weeks: 8 wks  Current Treatment Recommendations: Strengthening, ROM, Functional mobility training, Endurance training, Neuromuscular re-education, Manual, Pain management, Return to work related activity, Home exercise program, Safety education & training, Patient/Caregiver education & training, Equipment evaluation, education, & procurement, Modalities, Positioning, Therapeutic activities  Additional Comments: transfer PT POC to [facility], PT  Pt to continue current HEP. See objective section for any therapeutic exercise changes, additions or modifications this date.     Therapy Time:      PT Individual Minutes  Time In: 6409  Time Out: 4329  Minutes: 45  Timed Code Treatment Minutes: 45 Minutes  Procedure Minutes: N/A   Timed Activity Minutes Units   Ther Ex 35 2   Manual  10 1     Electronically signed by Diomedes Carty PTA on 1/10/23 at 10:08 AM EST

## 2023-01-12 ENCOUNTER — HOSPITAL ENCOUNTER (OUTPATIENT)
Dept: PHYSICAL THERAPY | Age: 38
Setting detail: THERAPIES SERIES
Discharge: HOME | End: 2023-01-12
Payer: COMMERCIAL

## 2023-01-12 PROCEDURE — 97110 THERAPEUTIC EXERCISES: CPT

## 2023-01-12 PROCEDURE — 97140 MANUAL THERAPY 1/> REGIONS: CPT

## 2023-01-12 ASSESSMENT — PAIN DESCRIPTION - LOCATION: LOCATION: SHOULDER

## 2023-01-12 ASSESSMENT — PAIN DESCRIPTION - PAIN TYPE: TYPE: SURGICAL PAIN

## 2023-01-12 ASSESSMENT — PAIN DESCRIPTION - DESCRIPTORS: DESCRIPTORS: SORE

## 2023-01-12 ASSESSMENT — PAIN SCALES - GENERAL: PAINLEVEL_OUTOF10: 2

## 2023-01-12 ASSESSMENT — PAIN DESCRIPTION - ORIENTATION: ORIENTATION: RIGHT

## 2023-01-12 ASSESSMENT — PAIN DESCRIPTION - FREQUENCY: FREQUENCY: CONTINUOUS

## 2023-01-12 NOTE — PROGRESS NOTES
[address] [facility] [address] [facility]  Outpatient Physical Therapy    Treatment Note        Date: 2023  Patient: Darrion Echeverria  : 1985   Confirmed: Yes  MRN: [id number]  Referring Provider: Jordana Stephens MD   Secondary Referring Provider (If applicable): none   Medical Diagnosis: Superior glenoid labrum lesion of unspecified shoulder, initial encounter [S43.439A]  Pain in unspecified shoulder [M25.519]  Encounter for procedure for purposes other than remedying health state, unspecified [Z41.9] labral tear of shoulder; shoulder pain; surgery elective  Treatment Diagnosis: R shoulder A/PROM, decreased R UE strength, and increased R shoulder pain which is preventing pt from returning to work at [address]:  Insurance: Payor: Wilver Oneill / Plan: [address] [place] [address] / Product Type: *No Product type* /   PT Visit Information  Onset Date: 22  PT Insurance Information: BCBS  Total # of Visits Approved: 25 (25 then auth; 60 per sridhar yr combined PT/OT/ST)  Total # of Visits to Date: 6  No Show: 0  Canceled Appointment: 0  Progress Note Counter:   Referring Provider (secondary): none    Subjective Information:  Subjective: Pt reports inc soreness following PT session lasting ~2 days. Pt states \"Right now I feel pretty good. \"  HEP Compliance:  [x] Good [] Fair [] Poor [] Reports not doing due to:    Pain Screening  Patient Currently in Pain: Yes  Pain Assessment: 0-10  Pain Level: 2 (2-3)  Pain Type: Surgical pain  Pain Location: Shoulder  Pain Orientation: Right  Pain Descriptors: Sore  Pain Frequency: Continuous    Treatment:  Exercises:  Exercises  Exercise 1: 3 way bicep curls x15 ea  Exercise 2: pulley X 3 min ea (flex/scap)  Exercise 3: wand exs supine: flex mid range only x10, cane push ups x10, serratus punch 5\"x10; standing ER/ scaption 5\"x10 ea  Exercise 4: PROM- see manual  Exercise 5: S/L ER, ABD and flex 2x10 ea (ROM within meng)  Exercise 6: 6 way shldr isometrics- Pt completing at home  Exercise 7: supine shoulder ABC  Exercise 8: shldr shrugs, rolls, retraction x10  Exercise 9: Finger ladder: flex L26, ABD L22  Exercise 20: HEP: 3 way bicep curls     Manual:   Manual Therapy  Soft Tissue Mobilizaton: ant/post shldr  Other: PROM- all planes - heavy encouragement to breathe and relax muscles during all ROM     Modalities:  Cryotherapy (CPT 03168)  Patient Position: Seated  Number Minutes Cryotherapy: 10 min  Cryotherapy location: Right, Shoulder     *Indicates exercise, modality, or manual techniques to be initiated when appropriate    Objective Measures:      Strength: [x] NT  [] MMT completed:      ROM: [x] NT  [] R  Assessment: Body Structures, Functions, Activity Limitations Requiring Skilled Therapeutic Intervention: Decreased functional mobility , Decreased ROM, Decreased strength, Decreased endurance, Increased pain  Assessment: Pt 5 wks post op today. Cont'd t progress Rt shldr A/AAROM w/ addition of 3 way bicep curls for inc functional use and gentle strengthening within sx protocol. Good meng to added exs  w/ decreased painful arcs noted during PROM. Pt expected to F/U w/ MD on 1/25/23. Treatment Diagnosis: R shoulder A/PROM, decreased R UE strength, and increased R shoulder pain which is preventing pt from returning to work at [address] [place] [address] Prognosis: Good     Post-Pain Assessment:       Pain Rating (0-10 pain scale):   2/10   Location and pain description same as pre-treatment unless indicated. Action: [] NA   [x] Perform HEP  [] Meds as prescribed  [x] Modalities as prescribed   [] Call Physician     GOALS   Patient Goal(s): Patient Goals : \"throw a baseball\"    Short Term Goals Completed by 4 wks Goal Status   STG 1 Pt will demonstrate improved A/PROM >/= 5-10 deg for return to normalized motion. In progress   STG 2 Pt will demosntrate improved R UE/shoulder strength >/= 4-/5 for return to lifting and return to work without restrictions.  In progress     Long Term Goals Completed by 8 wks Goal Status   LTG 1 Pt will demonstrate indep and 100% compliance with HEP for self management of symptoms and return to work. In progress   LTG 2 Pt will demonstrate improved score on UEFS >/= 50/80 for improved quality of life. In progress   LTG 3 Pt will demonstrate decreased R shoulder pain </= 2/10 during use of R UE. In progress     Plan:  Frequency/Duration:  Plan  Plan Frequency: 2xs/wk  Plan weeks: 8 wks  Current Treatment Recommendations: Strengthening, ROM, Functional mobility training, Endurance training, Neuromuscular re-education, Manual, Pain management, Return to work related activity, Home exercise program, Safety education & training, Patient/Caregiver education & training, Equipment evaluation, education, & procurement, Modalities, Positioning, Therapeutic activities  Additional Comments: transfer PT POC to [facility], PT  Pt to continue current HEP. See objective section for any therapeutic exercise changes, additions or modifications this date.     Therapy Time:      PT Individual Minutes  Time In: 1000  Time Out: 3580  Minutes: 55  Timed Code Treatment Minutes: 45 Minutes  Procedure Minutes: 10 min (CP)   Timed Activity Minutes Units   Ther Ex 35 2   Manual  10 1     Electronically signed by Margarita Mahan PTA on 1/12/23 at 11:11 AM EST

## 2023-01-13 DIAGNOSIS — E29.1 HYPOGONADISM IN MALE: ICD-10-CM

## 2023-01-13 DIAGNOSIS — J20.9 ACUTE BRONCHITIS, UNSPECIFIED ORGANISM: ICD-10-CM

## 2023-01-13 RX ORDER — ALBUTEROL SULFATE 2.5 MG/3ML
SOLUTION RESPIRATORY (INHALATION)
Qty: 150 ML | Refills: 3 | Status: SHIPPED | OUTPATIENT
Start: 2023-01-13

## 2023-01-13 RX ORDER — TESTOSTERONE CYPIONATE 200 MG/ML
200 INJECTION INTRAMUSCULAR
Qty: 6 ML | Refills: 1 | Status: SHIPPED | OUTPATIENT
Start: 2023-01-13 | End: 2023-07-12

## 2023-01-16 RX ORDER — TESTOSTERONE CYPIONATE 200 MG/ML
200 INJECTION INTRAMUSCULAR
Qty: 6 ML | Refills: 1 | OUTPATIENT
Start: 2023-01-16 | End: 2023-07-15

## 2023-01-17 ENCOUNTER — HOSPITAL ENCOUNTER (OUTPATIENT)
Dept: PHYSICAL THERAPY | Age: 38
Setting detail: THERAPIES SERIES
Discharge: HOME | End: 2023-01-17
Payer: COMMERCIAL

## 2023-01-17 PROCEDURE — 97110 THERAPEUTIC EXERCISES: CPT

## 2023-01-17 PROCEDURE — 97140 MANUAL THERAPY 1/> REGIONS: CPT

## 2023-01-17 NOTE — PROGRESS NOTES
[address] [facility]  Outpatient Physical Therapy    Treatment Note        Date: 2023  Patient: Bob Galicia  : 1985   Confirmed: Yes  MRN: [id number]  Referring Provider: Betina Ash MD   Secondary Referring Provider (If applicable): none   Medical Diagnosis: Superior glenoid labrum lesion of unspecified shoulder, initial encounter [S43.439A]  Pain in unspecified shoulder [M25.519]  Encounter for procedure for purposes other than remedying health state, unspecified [Z41.9]    Treatment Diagnosis: R shoulder A/PROM, decreased R UE strength, and increased R shoulder pain which is preventing pt from returning to work at [address]:  Insurance: Payor: Mitch Almonte 150 / Plan: [address] [place] [address] / Product Type: *No Product type* /   PT Visit Information  Onset Date: 22  PT Insurance Information: BCBS  Total # of Visits Approved: 25 (25 then auth; 60 per sridhar yr combined PT/OT/ST)  Total # of Visits to Date: 7  No Show: 0  Canceled Appointment: 0  Progress Note Counter:   Referring Provider (secondary): none    Subjective Information:  Subjective: Pt states \"It's sore today.  My left is more sore than the right (surgical) to be quite honest.\"  HEP Compliance:  [x] Good [] Fair [] Poor [] Reports not doing due to:    Pain Screening  Patient Currently in Pain: Yes  Pain Assessment: 0-10    Treatment:  Exercises:  Exercises  Exercise 1: 3 way bicep curls x15 ea  Exercise 2: pulley X 3 min ea (flex/scap), UBE 2' F/R  Exercise 3: wand exs supine: flex mid range only x10, serratus punch 5\"x10  Exercise 4: PROM- see manual  Exercise 5: Seated shldr flex/scap and ABD x10 ea , S/L ER 2x10  Exercise 6: 6 way shldr isometrics- Pt completing at home  Exercise 7: IR stretch 10\"x5 ea w/ belt  Exercise 8: shldr shrugs, rolls, retraction- pt completing indep  Exercise 9: Finger ladder: flex L27, ABD L24  Exercise 10: ER stretch at table 10\"x10  Exercise 20: HEP: shldr flex/AB, IR stretch     Manual:   Manual Therapy  Soft Tissue Mobilizaton: ant/post shldr  Other: PROM- all planes - heavy encouragement to breathe and relax muscles during all ROM     Modalities:  Pt declined     Objective Measures:      Strength: [x] NT  [] MMT completed:      ROM: [] NT  [x] ROM measurements:     AROM RUE (degrees)  R Shoulder Flexion (0-180): 155 deg  R Shoulder Extension (0-45): 54 deg  R Shoulder ABduction (0-180): 148 deg  R Shoulder Int Rotation  (0-70): T12  R Shoulder Ext Rotation (0-90): T3      Assessment: Body Structures, Functions, Activity Limitations Requiring Skilled Therapeutic Intervention: Decreased functional mobility , Decreased ROM, Decreased strength, Decreased endurance, Increased pain  Assessment: Cont'd to progress positions of shldr flex, scap and ABD AROM against inc gravityas well as addition of upper body ergonometer for GHJ mobility w/o added resistance at this time. Good meng to newly added exs. Rt shldr AROM assessed in standing w/ significant improvement seen in all planes since initial evaluation. Treatment Diagnosis: R shoulder A/PROM, decreased R UE strength, and increased R shoulder pain which is preventing pt from returning to work at [address] [place] [address] Prognosis: Good     Post-Pain Assessment:       Pain Rating (0-10 pain scale):   2/10   Location and pain description same as pre-treatment unless indicated. Action: [] NA   [] Perform HEP  [] Meds as prescribed  [x] Modalities as prescribed   [] Call Physician     GOALS   Patient Goal(s): Patient Goals : \"throw a baseball\"    Short Term Goals Completed by 4 wks Goal Status   STG 1 Pt will demonstrate improved A/PROM >/= 5-10 deg for return to normalized motion. In progress   STG 2 Pt will demosntrate improved R UE/shoulder strength >/= 4-/5 for return to lifting and return to work without restrictions.  In progress     Long Term Goals Completed by 8 wks Goal Status   LTG 1 Pt will demonstrate indep and 100% compliance with HEP for self management of symptoms and return to work. In progress   LTG 2 Pt will demonstrate improved score on UEFS >/= 50/80 for improved quality of life. In progress   LTG 3 Pt will demonstrate decreased R shoulder pain </= 2/10 during use of R UE. In progress     Plan:  Frequency/Duration:  Plan  Plan Frequency: 2xs/wk  Plan weeks: 8 wks  Current Treatment Recommendations: Strengthening, ROM, Functional mobility training, Endurance training, Neuromuscular re-education, Manual, Pain management, Return to work related activity, Home exercise program, Safety education & training, Patient/Caregiver education & training, Equipment evaluation, education, & procurement, Modalities, Positioning, Therapeutic activities  Additional Comments: transfer PT POC to [facility], PT  Pt to continue current HEP. See objective section for any therapeutic exercise changes, additions or modifications this date.     Therapy Time:      PT Individual Minutes  Time In: 0902  Time Out: 1164  Minutes: 43  Timed Code Treatment Minutes: 43 Minutes  Procedure Minutes: N/A   Timed Activity Minutes Units   Ther Ex 35 2   Manual  8 1     Electronically signed by Anil Anderson PTA on 1/17/23 at 11:54 AM EST

## 2023-01-19 ENCOUNTER — HOSPITAL ENCOUNTER (OUTPATIENT)
Dept: PHYSICAL THERAPY | Age: 38
Setting detail: THERAPIES SERIES
Discharge: HOME | End: 2023-01-19
Payer: COMMERCIAL

## 2023-01-19 PROCEDURE — 97110 THERAPEUTIC EXERCISES: CPT

## 2023-01-19 PROCEDURE — 97140 MANUAL THERAPY 1/> REGIONS: CPT

## 2023-01-19 ASSESSMENT — PAIN SCALES - GENERAL: PAINLEVEL_OUTOF10: 2

## 2023-01-19 ASSESSMENT — PAIN DESCRIPTION - ORIENTATION: ORIENTATION: RIGHT

## 2023-01-19 ASSESSMENT — PAIN DESCRIPTION - LOCATION: LOCATION: SHOULDER

## 2023-01-19 ASSESSMENT — PAIN DESCRIPTION - DESCRIPTORS: DESCRIPTORS: SORE

## 2023-01-19 ASSESSMENT — PAIN DESCRIPTION - PAIN TYPE: TYPE: SURGICAL PAIN

## 2023-01-19 NOTE — PROGRESS NOTES
[address] [facility]  Outpatient Physical Therapy    Treatment Note        Date: 2023  Patient: Clifford Hammer  : 1985   Confirmed: Yes  MRN: [id number]  Referring Provider: Luke Wilson MD   Secondary Referring Provider (If applicable): none   Medical Diagnosis: Superior glenoid labrum lesion of unspecified shoulder, initial encounter [S43.439A]  Pain in unspecified shoulder [M25.519]  Encounter for procedure for purposes other than remedying health state, unspecified [Z41.9]    Treatment Diagnosis: R shoulder A/PROM, decreased R UE strength, and increased R shoulder pain which is preventing pt from returning to work at [address]:  Insurance: Payor: Radha Camacho / Plan: Elliott Ewing / Product Type: *No Product type* /   PT Visit Information  Onset Date: 22  PT Insurance Information: BCBS  Total # of Visits Approved: 25 (25 then auth; 60 per sridhar yr combined PT/OT/ST)  Total # of Visits to Date: 8  No Show: 0  Canceled Appointment: 0  Progress Note Counter:   Referring Provider (secondary): none    Subjective Information:  Subjective: Pt states \"I was sore yesterday but I'm good now. \"  HEP Compliance:  [x] Good [] Fair [] Poor [] Reports not doing due to:    Pain Screening  Patient Currently in Pain: Yes  Pain Assessment: 0-10  Pain Level: 2  Pain Type: Surgical pain  Pain Location: Shoulder  Pain Orientation: Right  Pain Descriptors: Sore    Treatment:  Exercises:  Exercises  Exercise 1: 3 way bicep curls x15 ea w/ 1# wt  Exercise 2: pulley X 3 min ea (flex/scap), UBE 3' F/R  Exercise 3: wand exs supine: flex mid range only x10, serratus punch 5\"x10  Exercise 4: PROM- see manual  Exercise 5: Seated shldr flex/scap and ABD x10 ea (progress w/ 1# wt when able)  Exercise 6: Ball stabs at wall x10 ea (4 way)  Exercise 7: IR stretch 10\"x5 ea w/ belt  Exercise 8: TB shldr IR/ER (at neutral) x10 ea Rt  Exercise 9: Finger ladder: flex L27, ABD L27  Exercise 10: ER stretch at table 10\"x10  Exercise 20: HEP: shldr IR/ER w/ YTB     Manual:   Manual Therapy  Soft Tissue Mobilizaton: ant/post shldr  Other: PROM- all planes - heavy encouragement to breathe and relax muscles during all ROM     Modalities:  Pt declines, pt wishing to ice at home. Objective Measures:      Strength: [x] NT  [] MMT completed:     ROM: [x] NT  [] ROM measurements:       Assessment: Body Structures, Functions, Activity Limitations Requiring Skilled Therapeutic Intervention: Decreased functional mobility , Decreased ROM, Decreased strength, Decreased endurance, Increased pain  Assessment: Addition of ball stabilization exs at wall as well as TB shldr IR/ER at neutral to cont progressing Rt GHJ strength/stability as appropriate. Pt tolerating new exs well however, reports of in ant shldr discomfort and \"pulling\" w/ use of 1# DB during bicep curls, improve meng w/ reducing reps and cues to maintain elbow close to trunk for dec shldr strain. Pt notes pre to post tx soreness within tolerance despite inc pain level w/ soreness generally relieving following CP and within ~24hrs after appts. Pt progressing well within sx protocol. Treatment Diagnosis: R shoulder A/PROM, decreased R UE strength, and increased R shoulder pain which is preventing pt from returning to work at [address] [place] [address] Prognosis: Good     Post-Pain Assessment:       Pain Rating (0-10 pain scale):   4-5/10   Location and pain description same as pre-treatment unless indicated. Action: [] NA   [x] Perform HEP  [] Meds as prescribed  [x] Modalities as prescribed   [] Call Physician     GOALS   Patient Goal(s): Patient Goals : \"throw a baseball\"    Short Term Goals Completed by 4 wks Goal Status   STG 1 Pt will demonstrate improved A/PROM >/= 5-10 deg for return to normalized motion.  In progress   STG 2 Pt will demosntrate improved R UE/shoulder strength >/= 4-/5 for return to lifting and return to work without restrictions. In progress     Long Term Goals Completed by 8 wks Goal Status   LTG 1 Pt will demonstrate indep and 100% compliance with HEP for self management of symptoms and return to work. In progress   LTG 2 Pt will demonstrate improved score on UEFS >/= 50/80 for improved quality of life. In progress   LTG 3 Pt will demonstrate decreased R shoulder pain </= 2/10 during use of R UE. In progress     Plan:  Frequency/Duration:  Plan  Plan Frequency: 2xs/wk  Plan weeks: 8 wks  Specific Instructions for Next Treatment: PN to be completed; RTD 1/25/23  Current Treatment Recommendations: Strengthening, ROM, Functional mobility training, Endurance training, Neuromuscular re-education, Manual, Pain management, Return to work related activity, Home exercise program, Safety education & training, Patient/Caregiver education & training, Equipment evaluation, education, & procurement, Modalities, Positioning, Therapeutic activities  Additional Comments: transfer PT POC to [facility], PT  Pt to continue current HEP. See objective section for any therapeutic exercise changes, additions or modifications this date.     Therapy Time:      PT Individual Minutes  Time In: 0900  Time Out: 0077  Minutes: 45  Timed Code Treatment Minutes: 45 Minutes  Procedure Minutes: N/A   Timed Activity Minutes Units   Ther Ex 37 2   Manual  8 1     Electronically signed by George Hernández PTA on 1/19/23 at 11:17 AM EST

## 2023-01-23 ENCOUNTER — OFFICE VISIT (OUTPATIENT)
Dept: FAMILY MEDICINE CLINIC | Age: 38
End: 2023-01-23
Payer: COMMERCIAL

## 2023-01-23 VITALS
WEIGHT: 232.6 LBS | HEART RATE: 87 BPM | TEMPERATURE: 98.4 F | BODY MASS INDEX: 34.45 KG/M2 | DIASTOLIC BLOOD PRESSURE: 84 MMHG | SYSTOLIC BLOOD PRESSURE: 124 MMHG | OXYGEN SATURATION: 100 % | HEIGHT: 69 IN

## 2023-01-23 DIAGNOSIS — J01.40 ACUTE NON-RECURRENT PANSINUSITIS: ICD-10-CM

## 2023-01-23 DIAGNOSIS — F41.9 ANXIETY AND DEPRESSION: Primary | ICD-10-CM

## 2023-01-23 DIAGNOSIS — E29.1 HYPOGONADISM IN MALE: ICD-10-CM

## 2023-01-23 DIAGNOSIS — F32.A ANXIETY AND DEPRESSION: Primary | ICD-10-CM

## 2023-01-23 DIAGNOSIS — S43.401A SPRAIN OF RIGHT SHOULDER, UNSPECIFIED SHOULDER SPRAIN TYPE, INITIAL ENCOUNTER: ICD-10-CM

## 2023-01-23 DIAGNOSIS — M25.311 INSTABILITY OF RIGHT SHOULDER JOINT: ICD-10-CM

## 2023-01-23 PROCEDURE — 3074F SYST BP LT 130 MM HG: CPT | Performed by: FAMILY MEDICINE

## 2023-01-23 PROCEDURE — 3079F DIAST BP 80-89 MM HG: CPT | Performed by: FAMILY MEDICINE

## 2023-01-23 PROCEDURE — 99214 OFFICE O/P EST MOD 30 MIN: CPT | Performed by: FAMILY MEDICINE

## 2023-01-23 RX ORDER — DOXYCYCLINE HYCLATE 100 MG
100 TABLET ORAL 2 TIMES DAILY
Qty: 20 TABLET | Refills: 0 | Status: SHIPPED | OUTPATIENT
Start: 2023-01-23 | End: 2023-02-02

## 2023-01-23 RX ORDER — METHYLPREDNISOLONE 4 MG/1
TABLET ORAL
Qty: 1 KIT | Refills: 0 | Status: SHIPPED | OUTPATIENT
Start: 2023-01-23

## 2023-01-23 ASSESSMENT — PATIENT HEALTH QUESTIONNAIRE - PHQ9
SUM OF ALL RESPONSES TO PHQ QUESTIONS 1-9: 0
6. FEELING BAD ABOUT YOURSELF - OR THAT YOU ARE A FAILURE OR HAVE LET YOURSELF OR YOUR FAMILY DOWN: 0
10. IF YOU CHECKED OFF ANY PROBLEMS, HOW DIFFICULT HAVE THESE PROBLEMS MADE IT FOR YOU TO DO YOUR WORK, TAKE CARE OF THINGS AT HOME, OR GET ALONG WITH OTHER PEOPLE: 0
2. FEELING DOWN, DEPRESSED OR HOPELESS: 0
1. LITTLE INTEREST OR PLEASURE IN DOING THINGS: 0
8. MOVING OR SPEAKING SO SLOWLY THAT OTHER PEOPLE COULD HAVE NOTICED. OR THE OPPOSITE, BEING SO FIGETY OR RESTLESS THAT YOU HAVE BEEN MOVING AROUND A LOT MORE THAN USUAL: 0
9. THOUGHTS THAT YOU WOULD BE BETTER OFF DEAD, OR OF HURTING YOURSELF: 0
SUM OF ALL RESPONSES TO PHQ QUESTIONS 1-9: 0
5. POOR APPETITE OR OVEREATING: 0
SUM OF ALL RESPONSES TO PHQ QUESTIONS 1-9: 0
SUM OF ALL RESPONSES TO PHQ9 QUESTIONS 1 & 2: 0
7. TROUBLE CONCENTRATING ON THINGS, SUCH AS READING THE NEWSPAPER OR WATCHING TELEVISION: 0
3. TROUBLE FALLING OR STAYING ASLEEP: 0
SUM OF ALL RESPONSES TO PHQ QUESTIONS 1-9: 0
4. FEELING TIRED OR HAVING LITTLE ENERGY: 0

## 2023-01-23 NOTE — PROGRESS NOTES
Chief Complaint   Patient presents with    Anxiety     Check up    Sinus Problem     Since being sick last year can't get sinus clear, uses nasal spray       HPI:  Christiano Douglas is a 42 y.o. male    Follow up  Med discussion     Just had shoulder surgery   Slow recovery  Labral tear, rotator cuff tear     Weight stable     lamictal XR  Working pretty well    Wt Readings from Last 3 Encounters:   01/23/23 232 lb 9.6 oz (105.5 kg)   12/07/22 220 lb 0.3 oz (99.8 kg)   11/30/22 220 lb (99.8 kg)         Past Medical History:   Diagnosis Date    Asthma     Dermatitis     Fatty liver     Gastroesophageal reflux disease with hiatal hernia     Hypertension     Obesity (BMI 30-39. 9)      Past Surgical History:   Procedure Laterality Date    COLONOSCOPY  2013    ENDOSCOPY, COLON, DIAGNOSTIC  2013    LYMPH NODE BIOPSY      surgery due to cat scratch disease age 1    SHOULDER ARTHROSCOPY Right 12/8/2022    RIGHT SHOULDER ARTHROSCOPY ROTATOR CUFF REPAIR, ARTHROSCOPY SUBACROMIAL DECOMPRESSION DEBRIDEMENT performed by Donita Ramirez MD at [facility] 77 EXTRACTION       Family History   Problem Relation Age of Onset    Cancer Mother     Diabetes Paternal Grandmother     Diabetes Paternal Grandfather      Social History     Socioeconomic History    Marital status:      Spouse name: None    Number of children: 1    Years of education: None    Highest education level: None   Tobacco Use    Smoking status: Never    Smokeless tobacco: Never   Substance and Sexual Activity    Alcohol use: No     Alcohol/week: 0.0 standard drinks    Drug use: Yes     Types: Marijuana Winston Blow)     Comment: 12/7/2022 last time    Sexual activity: Yes     Partners: Female     Social Determinants of Health     Financial Resource Strain: Low Risk     Difficulty of Paying Living Expenses: Not hard at all   Food Insecurity: No Food Insecurity    Worried About Running Out of Food in the Last Year: Never true    Ran Out of Food in the Last Year: Never true   Transportation Needs: No Transportation Needs    Lack of Transportation (Medical): No    Lack of Transportation (Non-Medical): No     Current Outpatient Medications   Medication Sig Dispense Refill    methylPREDNISolone (MEDROL DOSEPACK) 4 MG tablet Take by mouth. 1 kit 0    doxycycline hyclate (VIBRA-TABS) 100 MG tablet Take 1 tablet by mouth 2 times daily for 10 days 20 tablet 0    albuterol (PROVENTIL) (2.5 MG/3ML) 0.083% nebulizer solution USE 1 VIAL IN NEBULIZER EVERY 6 HOURS AS NEEDED FOR WHEEZING 150 mL 3    testosterone cypionate (DEPOTESTOTERONE CYPIONATE) 200 MG/ML injection INJECT 1 ML INTO THE MUSCLE EVERY 14 DAYS  DAYS. 6 mL 1    albuterol sulfate HFA (PROVENTIL;VENTOLIN;PROAIR) 108 (90 Base) MCG/ACT inhaler INHALE 2 PUFFS BY MOUTH EVERY 6 HOURS AS NEEDED FOR WHEEZE 6.7 each 5    hydrOXYzine pamoate (VISTARIL) 25 MG capsule TAKE 1 CAPSULE BY MOUTH 3 TIMES DAILY AS NEEDED FOR ANXIETY. 30 capsule 2    naproxen (NAPROSYN) 500 MG tablet TAKE 1 TABLET BY MOUTH TWICE A DAY AS NEEDED FOR PAIN 60 tablet 0    lamoTRIgine (LAMICTAL XR) 200 MG TB24 extended release tablet TAKE 1 TABLET BY MOUTH EVERY DAY 90 tablet 1    tadalafil (CIALIS) 5 MG tablet Take 1 tablet by mouth daily 90 tablet 1    omeprazole (PRILOSEC) 40 MG delayed release capsule TAKE 1 CAPSULE BY MOUTH EVERY DAY 90 capsule 2    metoprolol (LOPRESSOR) 100 MG tablet TAKE 1 TABLET BY MOUTH TWICE A  tablet 1    fluticasone-salmeterol (ADVAIR DISKUS) 100-50 MCG/DOSE diskus inhaler Inhale 1 puff into the lungs every 12 hours 180 each 1    EPINEPHrine (EPIPEN 2-AUGUSTUS) 0.3 MG/0.3ML SOAJ injection Inject 0.3 mLs into the muscle once as needed (allergic reaction/bee sting) Use as directed for allergic reaction  Dispense 1 two pack (epi pen) 1 each 0     No current facility-administered medications for this visit.      Allergies   Allergen Reactions    Amlodipine Swelling    Bee Venom     Compazine [Prochlorperazine Maleate] Swelling    Lisinopril Hives and Swelling     Skin tightens on his face and gets hives       Review of Systems:   General ROS: no f/c/n/v  Respiratory ROS: no cough, shortness of breath, or wheezing  Cardiovascular ROS: no chest pain or dyspnea on exertion  Gastrointestinal ROS: no abdominal pain, change in bowel habits, or black or bloody stools  Genito-Urinary ROS: per HPI  Musculoskeletal ROS: negative for - gait disturbance, joint pain or joint stiffness  Neurological ROS: negative for - behavioral changes, memory loss, numbness/tingling, tremors or weakness    In general patient otherwise reports feeling well. Physical Exam:  /84 (Site: Left Upper Arm)   Pulse 87   Temp 98.4 °F (36.9 °C)   Ht 5' 9\" (1.753 m)   Wt 232 lb 9.6 oz (105.5 kg)   SpO2 100%   BMI 34.35 kg/m²     Gen: Well, NAD, Alert, Oriented x 3   HEENT: EOMI, eyes clear, MMM  Skin: no rash or jaundice  Lungs: CTA B w/out Rales/Wheezes/Rhonchi, Good respiratory effort   Heart: RRR, S1S2, w/out M/R/G, non-displaced PMI   Psych: generally euthymic here today with intact insight and judgement, no SI/HI  Neuro: Neurovascularly intact w/ Sensory/Motor intact UE/LE Bilaterally. Lab Results   Component Value Date    WBC 7.1 11/30/2022    HGB 15.7 11/30/2022    HCT 46.6 11/30/2022     11/30/2022    CHOL 213 (H) 10/06/2020    TRIG 139 10/06/2020    HDL 54 10/06/2020    ALT 34 11/30/2022    AST 19 11/30/2022     11/30/2022    K 3.9 11/30/2022     11/30/2022    CREATININE 0.76 11/30/2022    BUN 12 11/30/2022    CO2 27 11/30/2022    TSH 2.140 10/06/2020    PSA 0.54 08/07/2019    INR 1.0 11/30/2022    LABA1C 5.6 12/23/2020         A&P   Diagnosis Orders   1. Anxiety and depression        2. Hypogonadism in male        3. Instability of right shoulder joint        4. Sprain of right shoulder, unspecified shoulder sprain type, initial encounter        5.  Acute non-recurrent pansinusitis  methylPREDNISolone (MEDROL DOSEPACK) 4 MG tablet    doxycycline hyclate (VIBRA-TABS) 100 MG tablet        Chronic conditions are stable  Continue current regimen  Follow up with appropriate specialists and here routinely for ongoing monitoring of chronic conditions      Off work due to his shoulder surgery  Will have other shoulder operated on soon    Medrol and doxycycline for Severiano Barr MD

## 2023-01-24 ENCOUNTER — HOSPITAL ENCOUNTER (OUTPATIENT)
Dept: PHYSICAL THERAPY | Age: 38
Setting detail: THERAPIES SERIES
Discharge: HOME | End: 2023-01-24
Payer: COMMERCIAL

## 2023-01-24 PROCEDURE — 97140 MANUAL THERAPY 1/> REGIONS: CPT

## 2023-01-24 PROCEDURE — 97110 THERAPEUTIC EXERCISES: CPT

## 2023-01-24 ASSESSMENT — PAIN DESCRIPTION - DESCRIPTORS: DESCRIPTORS: SORE

## 2023-01-24 ASSESSMENT — PAIN DESCRIPTION - ORIENTATION: ORIENTATION: RIGHT

## 2023-01-24 ASSESSMENT — PAIN DESCRIPTION - FREQUENCY: FREQUENCY: CONTINUOUS

## 2023-01-24 ASSESSMENT — PAIN DESCRIPTION - LOCATION: LOCATION: SHOULDER

## 2023-01-24 ASSESSMENT — PAIN SCALES - GENERAL: PAINLEVEL_OUTOF10: 2

## 2023-01-24 NOTE — PROGRESS NOTES
Radha hensley [address]     Ph: [phone]  Fax: [phone]      [] Certification  [] Recertification []  Plan of Care  [x] Progress Note [] Discharge      Referring Provider: Zay Anderson MD Referring Provider (secondary): none   From:  Elida Broderick, PT  Patient: Lex Davenport (61 y.o. male) : 1985 Date: 2023  Medical Diagnosis: Superior glenoid labrum lesion of unspecified shoulder, initial encounter [S43.439A]  Pain in unspecified shoulder [M25.519]  Encounter for procedure for purposes other than remedying health state, unspecified [Z41.9] labral tear of shoulder; shoulder pain; surgery elective Diagnosis: labral tear of shoulder; shoulder pain; surgery elective   Treatment Diagnosis: R shoulder A/PROM, decreased R UE strength, and increased R shoulder pain which is preventing pt from returning to work at [employer] of Care/Certification Expiration Date: :     Progress Report Period from:  2023  to 2023    Visits to Date: 9 No Show: 0 Cancelled Appts: 0    OBJECTIVE:   Short Term Goals - Time Frame for Short Term Goals: 4 wks    Goals Current/Discharge status  Status   Short Term Goal 1: Pt will demonstrate improved A/PROM >/= 5-10 deg for return to normalized motion. PROM RUE (degrees)  R Shoulder Flex  (0-180): 160 supine after PROM  R Shoulder ABduction (0-180): 148 deg  R Shoulder Int Rotation  (0-70): 58 (at 45 deg ABD)  R Shoulder Ext Rotation  (0-90): 90 at 45 deg ABD     AROM RUE (degrees)  R Shoulder Flexion (0-180): 148 deg  R Shoulder Extension (0-45): 54 deg  R Shoulder ABduction (0-180): 152 deg  R Shoulder Int Rotation  (0-70): T12  R Shoulder Ext Rotation (0-90): T3  In progress   Short Term Goal 2: Pt will demosntrate improved R UE/shoulder strength >/= 4-/5 for return to lifting and return to work without restrictions.     Strength RUE  R Shoulder Flexion: 4-/5  R Shoulder Extension: 4/5  R Shoulder ABduction: 3+/5  R Shoulder Internal Rotation: 4-/5  R Shoulder External Rotation: 4-/5  R Elbow Flexion: 3+/5  R Elbow Extension: 4/5 In progress, Partially met     Long Term Goals - Time Frame for Long Term Goals : 8 wks  Goals Current/ Discharge status Status   Long Term Goal 1: Pt will demonstrate indep and 100% compliance with HEP for self management of symptoms and return to work. Indep/compliant w/ current; ongoing  In progress   Long Term Goal 2: Pt will demonstrate improved score on UEFS >/= 50/80 for improved quality of life. 42/80 or 53% function  In progress   Long Term Goal 3: Pt will demonstrate decreased R shoulder pain </= 2/10 during use of R UE. Pain Screening  Patient Currently in Pain: Yes  Pain Assessment: 0-10  Pain Level: 2  Best Pain Level: 2  Worst Pain Level: 7  Pain Location: Shoulder  Pain Orientation: Right  Pain Descriptors: Sore  Pain Frequency: Continuous  In progress     Body Structures, Functions, Activity Limitations Requiring Skilled Therapeutic Intervention: Decreased functional mobility , Decreased ROM, Decreased strength, Decreased endurance, Increased pain  Assessment: PN completed today in preparation for RTD tomorrow. Pt verbalizing ~40-50% functional ability at this time w/ increased UEFI score of 53% function vs initial 20%. Pt currently progressing well within week 6 (phase II) of sx protocol w/ pt reporting good compliance to coordinating HEP and daily use of CP for pain management. Pain cont's to be limiting factor upon reaching full ROM at this time however, gradual increase in both passive and AROM since eval. Strength was reassessed w/ good improvement in all planes.   Therapy Prognosis: Good    PLAN:  Frequency/Duration:  Plan Frequency: 2xs/wk  Plan weeks: 8 wks  Current Treatment Recommendations: Strengthening, ROM, Functional mobility training, Endurance training, Neuromuscular re-education, Manual, Pain management, Return to work related activity, Home exercise program, Safety education & training, Patient/Caregiver education & training, Equipment evaluation, education, & procurement, Modalities, Positioning, Therapeutic activities     Precautions:     SAD sx protocol                       Patient Status:[x] Continue/ Initiate plan of Care    [] Discharge PT. Recommend pt continue with HEP. [] Additional visits requested, Please re-certify for additional visits:    [] Hold         Signature: Objective information by: Electronically signed by Neela Reyes PTA on 1/24/23 at 10:51 AM EST    Electronically signed by Javier Argueta PT on 1/26/23 at 7:43 AM EST    If you have any questions or concerns, please don't hesitate to call. Thank you for your referral.    I have reviewed this plan of care and certify a need for medically necessary rehabilitation services.     Physician Signature:__________________________________________________________  Date:  Please sign and return

## 2023-01-24 NOTE — PROGRESS NOTES
[address] [facility]  Outpatient Physical Therapy    Treatment Note        Date: 2023  Patient: Jeremiah Murrieta  : 1985   Confirmed: Yes  MRN: [id number]  Referring Provider: Bright Ramsay MD   Secondary Referring Provider (If applicable): none   Medical Diagnosis: Superior glenoid labrum lesion of unspecified shoulder, initial encounter [S43.439A]  Pain in unspecified shoulder [M25.519]  Encounter for procedure for purposes other than remedying health state, unspecified [Z41.9] labral tear of shoulder; shoulder pain; surgery elective  Treatment Diagnosis: R shoulder A/PROM, decreased R UE strength, and increased R shoulder pain which is preventing pt from returning to work at [address]:  Insurance: Payor: Milan Stevenson / Plan: [address] [place] [address] / Product Type: *No Product type* /   PT Visit Information  Onset Date: 22  PT Insurance Information: BCBS  Total # of Visits Approved: 25 (25 then auth; 60 per sridhar yr combined PT/OT/ST)  Total # of Visits to Date: 9  No Show: 0  Canceled Appointment: 0  Progress Note Counter:   Referring Provider (secondary): none    Subjective Information:  Subjective: Pt states \"I've been really sore. It's been popping and since then, it's been hurtin. \" Pt notes inc pain/popping w/ reaching overhead into ABD plane. Pt has tendency gravitate to Rt side during the night therefore, wakes up w/ inc pain.   HEP Compliance:  [x] Good [] Fair [] Poor [] Reports not doing due to:    Pain Screening  Patient Currently in Pain: Yes  Pain Assessment: 0-10  Pain Level: 2  Best Pain Level: 2  Worst Pain Level: 7  Pain Location: Shoulder  Pain Orientation: Right  Pain Descriptors: Sore  Pain Frequency: Continuous    Treatment:  Exercises:  Exercises  Exercise 1: MMT/ROM measures  Exercise 2: pulley X 3 min ea (flex/scap), UBE 3' F/R  Exercise 3: wand exs supine: flex mid range only x10, serratus punch 5\"x10  Exercise 4: PROM- see manual  Exercise 5: Seated shldr flex/scap and ABD x10 ea (progress w/ 1# wt when able)  Exercise 6: Ball stabs at wall x10 ea (4 way)  Exercise 7: IR stretch 10\"x5 ea w/ belt  Exercise 8: TB shldr IR/ER (at neutral) x10 ea Rt  Exercise 9: Finger ladder: flex L28, ABD L28  Exercise 10: ER stretch at table 10\"x10  Exercise 20: HEP: cont current     Manual:   Manual Therapy  Soft Tissue Mobilizaton: ant/post shldr  Other: PROM- all planes - heavy encouragement to breathe and relax muscles during all ROM     Modalities:  Cryotherapy (CPT 35536)  Patient Position: Seated  Number Minutes Cryotherapy: 10 min  Cryotherapy location: Right, Shoulder     *Indicates exercise, modality, or manual techniques to be initiated when appropriate    Objective Measures:      Strength: [] NT  [x] MMT completed:   Strength RUE  R Shoulder Flexion: 4-/5  R Shoulder Extension: 4/5  R Shoulder ABduction: 3+/5  R Shoulder Internal Rotation: 4-/5  R Shoulder External Rotation: 4-/5  R Elbow Flexion: 3+/5  R Elbow Extension: 4/5    ROM: [] NT  [x] ROM measurements:   AROM RUE (degrees)  R Shoulder Flexion (0-180): 148 deg  R Shoulder Extension (0-45): 54 deg  R Shoulder ABduction (0-180): 152 deg  R Shoulder Int Rotation  (0-70): T12  R Shoulder Ext Rotation (0-90): T3    PROM RUE (degrees)  R Shoulder Flex  (0-180): 160 supine after PROM  R Shoulder ABduction (0-180): 148 deg  R Shoulder Int Rotation  (0-70): 58 (at 45 deg ABD)  R Shoulder Ext Rotation  (0-90): 90 at 45 deg ABD      Assessment: Body Structures, Functions, Activity Limitations Requiring Skilled Therapeutic Intervention: Decreased functional mobility , Decreased ROM, Decreased strength, Decreased endurance, Increased pain  Assessment: PN completed today in preparation for RTD tomorrow. Pt verbalizing ~40-50% functional ability at this time w/ increased UEFI score of 53% function vs initial 20%.  Pt currently progressing well within week 6 (phase II) of sx protocol w/ pt reporting good compliance to coordinating HEP and daily use of CP for pain management. Pain cont's to be limiting factor upon reaching full ROM at this time however, gradual increase in both passive and AROM since eval. Strength was reassessed w/ good improvement in all planes. Treatment Diagnosis: R shoulder A/PROM, decreased R UE strength, and increased R shoulder pain which is preventing pt from returning to work at [address] [place] [address] Prognosis: Good    Post-Pain Assessment:       Pain Rating (0-10 pain scale):   4/10   Location and pain description same as pre-treatment unless indicated. Action: [] NA   [x] Perform HEP  [] Meds as prescribed  [x] Modalities as prescribed   [] Call Physician     GOALS   Patient Goal(s): Patient Goals : \"throw a baseball\"    Short Term Goals Completed by 4 wks Goal Status   STG 1 Pt will demonstrate improved A/PROM >/= 5-10 deg for return to normalized motion. In progress   STG 2 Pt will demosntrate improved R UE/shoulder strength >/= 4-/5 for return to lifting and return to work without restrictions. In progress, Partially met     Long Term Goals Completed by 8 wks Goal Status   LTG 1 Pt will demonstrate indep and 100% compliance with HEP for self management of symptoms and return to work. In progress   LTG 2 Pt will demonstrate improved score on UEFS >/= 50/80 for improved quality of life. In progress   LTG 3 Pt will demonstrate decreased R shoulder pain </= 2/10 during use of R UE.  In progress     Plan:  Frequency/Duration:  Plan  Plan Frequency: 2xs/wk  Plan weeks: 8 wks  Current Treatment Recommendations: Strengthening, ROM, Functional mobility training, Endurance training, Neuromuscular re-education, Manual, Pain management, Return to work related activity, Home exercise program, Safety education & training, Patient/Caregiver education & training, Equipment evaluation, education, & procurement, Modalities, Positioning, Therapeutic activities  Additional Comments: transfer PT POC to [facility], PT; PN  completed for RTD 1/25/23  Pt to continue current HEP. See objective section for any therapeutic exercise changes, additions or modifications this date.     Therapy Time:      PT Individual Minutes  Time In: 0840  Time Out: 0940  Minutes: 60  Timed Code Treatment Minutes: 50 Minutes  Procedure Minutes: 10 min (CP)   Timed Activity Minutes Units   Ther Ex 35 2   Manual  15 1     Electronically signed by Tonny Clinton PTA on 1/24/23 at 10:54 AM EST

## 2023-01-26 ENCOUNTER — HOSPITAL ENCOUNTER (OUTPATIENT)
Dept: PHYSICAL THERAPY | Age: 38
Setting detail: THERAPIES SERIES
Discharge: HOME | End: 2023-01-26
Payer: COMMERCIAL

## 2023-01-26 PROCEDURE — 97110 THERAPEUTIC EXERCISES: CPT

## 2023-01-26 PROCEDURE — 97140 MANUAL THERAPY 1/> REGIONS: CPT

## 2023-01-26 ASSESSMENT — PAIN DESCRIPTION - DESCRIPTORS: DESCRIPTORS: SORE;SHARP

## 2023-01-26 ASSESSMENT — PAIN DESCRIPTION - ORIENTATION: ORIENTATION: ANTERIOR

## 2023-01-26 ASSESSMENT — PAIN DESCRIPTION - LOCATION: LOCATION: SHOULDER

## 2023-01-26 ASSESSMENT — PAIN SCALES - GENERAL: PAINLEVEL_OUTOF10: 3

## 2023-01-26 ASSESSMENT — PAIN DESCRIPTION - PAIN TYPE: TYPE: SURGICAL PAIN

## 2023-01-26 NOTE — PROGRESS NOTES
[address] [facility] [address] and Therapy  Outpatient Physical Therapy    Treatment Note        Date: 2023  Patient: Gerda Porter  : 1985   Confirmed: Yes  MRN: [id number]  Referring Provider: Carmen Denny MD   Secondary Referring Provider (If applicable): none   Medical Diagnosis: Superior glenoid labrum lesion of unspecified shoulder, initial encounter [S43.439A]  Pain in unspecified shoulder [M25.519]  Encounter for procedure for purposes other than remedying health state, unspecified [Z41.9] labral tear of shoulder; shoulder pain; surgery elective  Treatment Diagnosis: R shoulder A/PROM, decreased R UE strength, and increased R shoulder pain which is preventing pt from returning to work at [address]:  Insurance: Payor: [facility] / Plan: [address] [place] [address] / Product Type: *No Product type* /   PT Visit Information  Onset Date: 22  PT Insurance Information: BCBS  Total # of Visits Approved: 25 (25 then auth; 60 per sridhar yr combined PT/OT/ST)  Total # of Visits to Date: 9  No Show: 0  Canceled Appointment: 0  Progress Note Counter:   Referring Provider (secondary): none    Subjective Information:  Subjective: Pt presenting to appt w/ reports of ongoing \"sharp\" ant shldr pain following PT appts. Pt notes most discomfort w/ exs positioned at 45 deg.   HEP Compliance:  [x] Good [] Fair [] Poor [] Reports not doing due to:    Pain Screening  Patient Currently in Pain: Yes  Pain Assessment: 0-10  Pain Level: 3  Pain Type: Surgical pain  Pain Location: Shoulder  Pain Orientation: Anterior  Pain Descriptors: Sore, Sharp    Treatment:  Exercises:  Exercises  Exercise 2: pulley X 3 min ea (flex/scap), UBE 3' F/R  Exercise 3: wand exs supine: flex mid range only x10, serratus punch 5\"x10, standing wand ext and IR up back 5\"x10 ea  Exercise 4: PROM- see manual  Exercise 7: IR stretch 10\"x5 ea w/ belt  Exercise 8: Reactive isometrics: TB shldr flex/ext, IR/ER (at neutral) x10 ea Rt  Exercise 9: Finger ladder: flex L28, ABD L28  Exercise 10: ER stretch at table 10\"x10  Exercise 20: HEP: reactive isos     Manual:   Manual Therapy  Soft Tissue Mobilizaton: ant/post shldr  Other: PROM- all planes - heavy encouragement to breathe and relax muscles during all ROM     Modalities:  Pt declines- pt wishes to ice at home     Objective Measures:     Strength: [x] NT  [] MMT completed:     ROM: [x] NT  [] ROM measurements:     Assessment: Body Structures, Functions, Activity Limitations Requiring Skilled Therapeutic Intervention: Decreased functional mobility , Decreased ROM, Decreased strength, Decreased endurance, Increased pain  Assessment: Pt 7 wks post op today. Modified TB exs to reactive isometrics to avoid \"popping\" sensation and pain associated w/ AROM exercises; improved meng. Offerred suggestions on sleeping positions on Lt side w/ pillow supporting Rt UE at neutral position to assess for change in pain/stiffness in AM.  AAROM cont's to improve on finger ladder with pt reaching full PROM in all planes during manual.   Pt cont's to note poor sleep quality. Treatment Diagnosis: R shoulder A/PROM, decreased R UE strength, and increased R shoulder pain which is preventing pt from returning to work at [address] [place] [address] Prognosis: Good     Post-Pain Assessment:       Pain Rating (0-10 pain scale):   2/10   Location and pain description same as pre-treatment unless indicated. Action: [] NA   [x] Perform HEP  [] Meds as prescribed  [x] Modalities as prescribed   [] Call Physician     GOALS   Patient Goal(s): Patient Goals : \"throw a baseball\"    Short Term Goals Completed by 4 wks Goal Status   STG 1 Pt will demonstrate improved A/PROM >/= 5-10 deg for return to normalized motion. In progress   STG 2 Pt will demosntrate improved R UE/shoulder strength >/= 4-/5 for return to lifting and return to work without restrictions.  In progress, Partially met     Long Term Goals Completed by 8 wks Goal Status   LTG 1 Pt will demonstrate indep and 100% compliance with HEP for self management of symptoms and return to work. In progress   LTG 2 Pt will demonstrate improved score on UEFS >/= 50/80 for improved quality of life. In progress   LTG 3 Pt will demonstrate decreased R shoulder pain </= 2/10 during use of R UE. In progress     Plan:  Frequency/Duration:  Plan  Plan Frequency: 2xs/wk  Plan weeks: 8 wks  Current Treatment Recommendations: Strengthening, ROM, Functional mobility training, Endurance training, Neuromuscular re-education, Manual, Pain management, Return to work related activity, Home exercise program, Safety education & training, Patient/Caregiver education & training, Equipment evaluation, education, & procurement, Modalities, Positioning, Therapeutic activities  Additional Comments: transfer PT POC to [facility], PT; PN  completed for RTD 1/25/23  Pt to continue current HEP. See objective section for any therapeutic exercise changes, additions or modifications this date.     Therapy Time:      PT Individual Minutes  Time In: 0048  Time Out: 1426  Minutes: 41  Timed Code Treatment Minutes: 41 Minutes  Procedure Minutes: N/A   Timed Activity Minutes Units   Ther Ex 31 2   Manual  10 1     Electronically signed by Juana Noble PTA on 1/26/23 at 2:41 PM EST

## 2023-01-31 ENCOUNTER — HOSPITAL ENCOUNTER (OUTPATIENT)
Dept: PHYSICAL THERAPY | Age: 38
Setting detail: THERAPIES SERIES
Discharge: HOME | End: 2023-01-31
Payer: COMMERCIAL

## 2023-01-31 ENCOUNTER — APPOINTMENT (OUTPATIENT)
Dept: PHYSICAL THERAPY | Age: 38
End: 2023-01-31
Payer: COMMERCIAL

## 2023-01-31 PROCEDURE — 97110 THERAPEUTIC EXERCISES: CPT

## 2023-01-31 ASSESSMENT — PAIN DESCRIPTION - ORIENTATION: ORIENTATION: ANTERIOR

## 2023-01-31 ASSESSMENT — PAIN SCALES - GENERAL: PAINLEVEL_OUTOF10: 3

## 2023-01-31 ASSESSMENT — PAIN DESCRIPTION - FREQUENCY: FREQUENCY: CONTINUOUS

## 2023-01-31 ASSESSMENT — PAIN DESCRIPTION - LOCATION: LOCATION: SHOULDER

## 2023-01-31 ASSESSMENT — PAIN DESCRIPTION - DESCRIPTORS: DESCRIPTORS: SORE;SHARP

## 2023-01-31 NOTE — PROGRESS NOTES
[address] [facility] [facility]  Outpatient Physical Therapy    Treatment Note        Date: 2023  Patient: Deborrah Severance  : 1985   Confirmed: Yes  MRN: [id number]  Referring Provider: Marta Montelongo MD   Secondary Referring Provider (If applicable): none   Medical Diagnosis: Superior glenoid labrum lesion of unspecified shoulder, initial encounter [S43.439A]  Pain in unspecified shoulder [M25.519]  Encounter for procedure for purposes other than remedying health state, unspecified [Z41.9]    Treatment Diagnosis: R shoulder A/PROM, decreased R UE strength, and increased R shoulder pain which is preventing pt from returning to work at [address]:  Insurance: Payor: [employer] Hooker / Plan: [address] [place] [address] / Product Type: *No Product type* /   PT Visit Information  Onset Date: 22  PT Insurance Information: BCBS  Total # of Visits Approved: 25 (25 then auth; 60 per sridhar yr combined PT/OT/ST)  Total # of Visits to Date: 11  No Show: 0  Canceled Appointment: 0  Progress Note Counter:  (PN counter corrected)  Referring Provider (secondary): none    Subjective Information:  Subjective: Pt presenting to appt w/ reports of ongoing \"sharp\" ant shldr pain following PT appts. Pt notes most discomfort w/ exs positioned at 45 deg.   HEP Compliance:  [x] Good [] Fair [] Poor [] Reports not doing due to:    Pain Screening  Patient Currently in Pain: Yes  Pain Assessment: 0-10  Pain Level: 3  Pain Location: Shoulder  Pain Orientation: Anterior  Pain Descriptors: Sore, Sharp  Pain Frequency: Continuous    Treatment:  Exercises:  Exercises  Exercise 2: pulley X 3 min ea (flex/scap), UBE 3' F/R  Exercise 3: standing wand ext and IR up back 5\"x10 ea  Exercise 4: PROM- see manual  Exercise 5: Standing shldr flex/scap and ABD x10 ea (1# flexion)  Exercise 7: IR stretch 10\"x5 ea w/ belt  Exercise 8: Reactive isometrics: TB shldr flex/ext, IR/ER (at neutral) x10 ea Rt with RTB  Exercise 9: Finger ladder: flex L28, ABD L28  Exercise 10: ER stretch at table 10\"x10  Exercise 20: HEP: reactive isos       Manual:   Manual Therapy  Soft Tissue Mobilizaton: ant/post shldr  Other: PROM- all planes - heavy encouragement to breathe and relax muscles during all ROM       Assessment: Body Structures, Functions, Activity Limitations Requiring Skilled Therapeutic Intervention: Decreased functional mobility , Decreased ROM, Decreased strength, Decreased endurance, Increased pain  Assessment: Continued current exercise program to strengthening and improve ROM to Rt shoulder. Progressed pt with addition of weight to exercises as appropriate. Cues to avoid substition with exercises. Increase pain noted with shoulder flexion during mid range, pt advised to avoid painful movements. Pt declined CP post treatment stating he will do it at home. Treatment Diagnosis: R shoulder A/PROM, decreased R UE strength, and increased R shoulder pain which is preventing pt from returning to work at [address] [place] [address] Prognosis: Good          Post-Pain Assessment:       Pain Rating (0-10 pain scale):   2/10   Location and pain description same as pre-treatment unless indicated. Action: [] NA   [x] Perform HEP  [] Meds as prescribed  [] Modalities as prescribed   [] Call Physician     GOALS   Patient Goal(s): Patient Goals : \"throw a baseball\"    Short Term Goals Completed by 4 wks Goal Status   STG 1 Pt will demonstrate improved A/PROM >/= 5-10 deg for return to normalized motion. In progress   STG 2 Pt will demosntrate improved R UE/shoulder strength >/= 4-/5 for return to lifting and return to work without restrictions. In progress, Partially met       Long Term Goals Completed by 8 wks Goal Status   LTG 1 Pt will demonstrate indep and 100% compliance with HEP for self management of symptoms and return to work. In progress   LTG 2 Pt will demonstrate improved score on UEFS >/= 50/80 for improved quality of life.  In progress   LTG 3 Pt will demonstrate decreased R shoulder pain </= 2/10 during use of R UE. In progress            Plan:  Frequency/Duration:  Plan  Plan Frequency: 2xs/wk  Plan weeks: 8 wks  Current Treatment Recommendations: Strengthening, ROM, Functional mobility training, Endurance training, Neuromuscular re-education, Manual, Pain management, Return to work related activity, Home exercise program, Safety education & training, Patient/Caregiver education & training, Equipment evaluation, education, & procurement, Modalities, Positioning, Therapeutic activities  Additional Comments: transfer PT POC to [facility], PT; PN  completed for RTD 1/25/23  Pt to continue current HEP. See objective section for any therapeutic exercise changes, additions or modifications this date.     Therapy Time:      PT Individual Minutes  Time In: 6432  Time Out: 0930  Minutes: 47  Timed Code Treatment Minutes: 47 Minutes  Procedure Minutes:0  Timed Activity Minutes Units   Ther Ex 40 3   Manual  7 0     Electronically signed by Edward Syed PTA on 1/31/23 at 12:23 PM EST

## 2023-02-02 ENCOUNTER — HOSPITAL ENCOUNTER (OUTPATIENT)
Dept: PHYSICAL THERAPY | Age: 38
Setting detail: THERAPIES SERIES
Discharge: HOME | End: 2023-02-02
Payer: COMMERCIAL

## 2023-02-02 ENCOUNTER — APPOINTMENT (OUTPATIENT)
Dept: PHYSICAL THERAPY | Age: 38
End: 2023-02-02
Payer: COMMERCIAL

## 2023-02-02 PROCEDURE — 97140 MANUAL THERAPY 1/> REGIONS: CPT

## 2023-02-02 PROCEDURE — 97110 THERAPEUTIC EXERCISES: CPT

## 2023-02-02 ASSESSMENT — PAIN DESCRIPTION - DESCRIPTORS: DESCRIPTORS: SORE

## 2023-02-02 ASSESSMENT — PAIN SCALES - GENERAL: PAINLEVEL_OUTOF10: 4

## 2023-02-02 ASSESSMENT — PAIN DESCRIPTION - LOCATION: LOCATION: SHOULDER

## 2023-02-02 NOTE — PROGRESS NOTES
[address] [facility] [address] [facility] [address] [facility]  Outpatient Physical Therapy    Treatment Note        Date: 2023  Patient: Gissel West  : 1985   Confirmed: Yes  MRN: [id number]  Referring Provider: Donovan Patel MD   Secondary Referring Provider (If applicable): none   Medical Diagnosis: Superior glenoid labrum lesion of unspecified shoulder, initial encounter [S43.439A]  Pain in unspecified shoulder [M25.519]  Encounter for procedure for purposes other than remedying health state, unspecified [Z41.9]    Treatment Diagnosis: R shoulder A/PROM, decreased R UE strength, and increased R shoulder pain which is preventing pt from returning to work at [address]:  Insurance: Payor: [employer] [facility] / Plan: [address] [place] [address] / Product Type: *No Product type* /   PT Visit Information  Onset Date: 22  PT Insurance Information: BCBS  Total # of Visits Approved: 25 (25 then auth; 60 per sridhar yr combined PT/OT/ST)  Total # of Visits to Date: 12  No Show: 0  Canceled Appointment: 0  Progress Note Counter:   Referring Provider (secondary): none    Subjective Information:  Subjective: Patient saw Dr. Alice Peter who prescribed an oral steroid. F/U in two weeks, if discomfort and clicking does not improve will receive cortisone injection. Did not comment on patient's progress.   HEP Compliance:  [x] Good [] Fair [] Poor [] Reports not doing due to:    Pain Screening  Patient Currently in Pain: Yes  Pain Level: 4  Pain Location: Shoulder  Pain Descriptors: Sore    Treatment:  Exercises:  Exercises  Exercise 2: pulley X 3 min ea (flex/scap), UBE 3' F/R level 1.5  Exercise 3: standing wand ext and IR up back 5\"x10 ea  Exercise 5: Standing shldr flex/scap and ABD x10 ea (1# flexion) Min discomfort  Exercise 6: Ball stabs at wall x10 ea (4 way)  Exercise 7: IR stretch 10\"x5 ea w/ belt  Exercise 9: Finger ladder: flex L30, ABD L28  Exercise 11: PNF*  Exercise 20: HEP: Continue current       Manual: Manual Therapy  Soft Tissue Mobilizaton: ant/post shldr  Other: PROM- all planes - improved abilities to relax            *Indicates exercise, modality, or manual techniques to be initiated when appropriate    Objective Measures:           ROM: [] NT  [x] ROM measurements:         AROM RUE (degrees)  R Shoulder Flexion (0-180): 150 deg  R Shoulder ABduction (0-180): 140 deg with pain                   Assessment: Body Structures, Functions, Activity Limitations Requiring Skilled Therapeutic Intervention: Decreased functional mobility , Decreased ROM, Decreased strength, Decreased endurance, Increased pain  Assessment: Patient able to progress shoulder flexion on finger ladder to level 30 suggesting improving flexion ROM. Limited shoulder abduction ROM due to pain. Continued discomfort at about 90deg with flexion AROM using 1# weight. Discussed with patient continuing focus on stretching over the weekend due to just beginning steriod to improve inflammation. Treatment Diagnosis: R shoulder A/PROM, decreased R UE strength, and increased R shoulder pain which is preventing pt from returning to work at [address] [place] [address] Prognosis: Good          Post-Pain Assessment:       Pain Rating (0-10 pain scale):   5/10   Location and pain description same as pre-treatment unless indicated. Action: [] NA   [] Perform HEP  [] Meds as prescribed  [x] Modalities as prescribed   [] Call Physician     GOALS   Patient Goal(s): Patient Goals : \"throw a baseball\"    Short Term Goals Completed by 4 wks Goal Status   STG 1 Pt will demonstrate improved A/PROM >/= 5-10 deg for return to normalized motion. In progress   STG 2 Pt will demosntrate improved R UE/shoulder strength >/= 4-/5 for return to lifting and return to work without restrictions. In progress, Partially met     Long Term Goals Completed by 8 wks Goal Status   LTG 1 Pt will demonstrate indep and 100% compliance with HEP for self management of symptoms and return to work.  In progress   LTG 2 Pt will demonstrate improved score on UEFS >/= 50/80 for improved quality of life. In progress   LTG 3 Pt will demonstrate decreased R shoulder pain </= 2/10 during use of R UE. In progress       Plan:  Frequency/Duration:  Plan  Plan Frequency: 2xs/wk  Plan weeks: 8 wks  Current Treatment Recommendations: Strengthening, ROM, Functional mobility training, Endurance training, Neuromuscular re-education, Manual, Pain management, Return to work related activity, Home exercise program, Safety education & training, Patient/Caregiver education & training, Equipment evaluation, education, & procurement, Modalities, Positioning, Therapeutic activities  Additional Comments: transfer PT POC to [facility], PT; PN  completed for RTD 1/25/23  Pt to continue current HEP. See objective section for any therapeutic exercise changes, additions or modifications this date.     Therapy Time:      PT Individual Minutes  Time In: 1100  Time Out: 7354  Minutes: 47  Timed Code Treatment Minutes: 47 Minutes  Procedure Minutes:0  Timed Activity Minutes Units   Ther Ex 37 2   Manual  10 1     Electronically signed by Nia Kasper PTA on 2/2/23 at 7:58 AM EST

## 2023-02-07 ENCOUNTER — APPOINTMENT (OUTPATIENT)
Dept: PHYSICAL THERAPY | Age: 38
End: 2023-02-07
Payer: COMMERCIAL

## 2023-02-07 ENCOUNTER — HOSPITAL ENCOUNTER (OUTPATIENT)
Dept: PHYSICAL THERAPY | Age: 38
Setting detail: THERAPIES SERIES
Discharge: HOME | End: 2023-02-07
Payer: COMMERCIAL

## 2023-02-07 PROCEDURE — 97110 THERAPEUTIC EXERCISES: CPT

## 2023-02-07 PROCEDURE — 97140 MANUAL THERAPY 1/> REGIONS: CPT

## 2023-02-07 ASSESSMENT — PAIN DESCRIPTION - DESCRIPTORS: DESCRIPTORS: SORE

## 2023-02-07 ASSESSMENT — PAIN DESCRIPTION - LOCATION: LOCATION: SHOULDER

## 2023-02-07 ASSESSMENT — PAIN DESCRIPTION - ORIENTATION: ORIENTATION: ANTERIOR

## 2023-02-07 ASSESSMENT — PAIN SCALES - GENERAL: PAINLEVEL_OUTOF10: 4

## 2023-02-07 NOTE — PROGRESS NOTES
[address] [facility] [address] [facility]  Outpatient Physical Therapy    Treatment Note        Date: 2023  Patient: Srinivas East  : 1985   Confirmed: Yes  MRN: [id number]  Referring Provider: Konstantin Short MD   Secondary Referring Provider (If applicable): none   Medical Diagnosis: Superior glenoid labrum lesion of unspecified shoulder, initial encounter [S43.439A]  Pain in unspecified shoulder [M25.519]  Encounter for procedure for purposes other than remedying health state, unspecified [Z41.9]    Treatment Diagnosis: R shoulder A/PROM, decreased R UE strength, and increased R shoulder pain which is preventing pt from returning to work at [address]:  Insurance: Payor: Ann Marie [employer] / Plan: [address] [place] [address] / Product Type: *No Product type* /   PT Visit Information  Onset Date: 22  PT Insurance Information: BCBS  Total # of Visits Approved: 25 (25 then auth; 60 per sridhar yr combined PT/OT/ST)  Total # of Visits to Date: 13  No Show: 0  Canceled Appointment: 0  Progress Note Counter:   Referring Provider (secondary): none    Subjective Information:  Subjective: Pt continues to report \"calm at rest but as soon as I start using it, it hurts. \" Pt notes most discomfort w/ flexion, scaption and reaching into IR behind back.   HEP Compliance:  [x] Good [] Fair [] Poor [] Reports not doing due to:    Pain Screening  Patient Currently in Pain: Yes  Pain Assessment: 0-10  Pain Level: 4  Pain Location: Shoulder  Pain Orientation: Anterior  Pain Descriptors: Sore    Treatment:  Exercises:  Exercises  Exercise 2: pulley X 3 min ea (flex/scap), UBE 3' F/R level 1.5  Exercise 3: standing wand ext and IR up back 5\"x10 ea  Exercise 5: Standing shldr flex/scap and ABD x10 ea (1# flexion) Min discomfort  Exercise 6: Ball stabs at wall x10 ea (4 way)  Exercise 7: Supine PNF w/ YTB: D1 and D2 ext x10 ea, ABC's w/ YTB x1 set  Exercise 8: Reactive isometrics: TB shldr flex/ext, IR/ER (at neutral) 5\"x10 ea Rt with RTB, ABD/ADD*  Exercise 9: Finger ladder: flex L30, ABD L28  Exercise 20: HEP: Continue current     Objective Measures:       Strength: [x] NT  [] MMT completed:     ROM: [] NT  [x] ROM measurements:  AROM RUE (degrees)  R Shoulder Flexion (0-180): 152 deg, scaption 162 deg  R Shoulder Extension (0-45): 54 deg  R Shoulder ABduction (0-180): 177 deg with pain  R Shoulder Int Rotation  (0-70): T10  R Shoulder Ext Rotation (0-90): T4      Assessment: Body Structures, Functions, Activity Limitations Requiring Skilled Therapeutic Intervention: Decreased functional mobility , Decreased ROM, Decreased strength, Decreased endurance, Increased pain  Assessment: Continuation of current ex program w/ modifications as needed to improve pt tolerance w/o gradual increase in pain. Addition of various PNF patterns performed in supine to address GHJ strengthening/stability throughout functional reaching/motions. Good meng to resistance in reduced gravity position. Gradual improvements in Rt shldr mobility throughout most planes ea week. Treatment Diagnosis: R shoulder A/PROM, decreased R UE strength, and increased R shoulder pain which is preventing pt from returning to work at [address] [place] [address] Prognosis: Good      Post-Pain Assessment:       Pain Rating (0-10 pain scale):   4/10   Location and pain description same as pre-treatment unless indicated. Action: [] NA   [x] Perform HEP  [] Meds as prescribed  [x] Modalities as prescribed   [] Call Physician     GOALS   Patient Goal(s): Patient Goals : \"throw a baseball\"    Short Term Goals Completed by 4 wks Goal Status   STG 1 Pt will demonstrate improved A/PROM >/= 5-10 deg for return to normalized motion. In progress   STG 2 Pt will demosntrate improved R UE/shoulder strength >/= 4-/5 for return to lifting and return to work without restrictions.  In progress, Partially met     Long Term Goals Completed by 8 wks Goal Status   LTG 1 Pt will demonstrate indep and 100% compliance with HEP for self management of symptoms and return to work. In progress   LTG 2 Pt will demonstrate improved score on UEFS >/= 50/80 for improved quality of life. In progress   LTG 3 Pt will demonstrate decreased R shoulder pain </= 2/10 during use of R UE. In progress     Plan:  Frequency/Duration:  Plan  Plan Frequency: 2xs/wk  Plan weeks: 8 wks  Current Treatment Recommendations: Strengthening, ROM, Functional mobility training, Endurance training, Neuromuscular re-education, Manual, Pain management, Return to work related activity, Home exercise program, Safety education & training, Patient/Caregiver education & training, Equipment evaluation, education, & procurement, Modalities, Positioning, Therapeutic activities  Additional Comments: transfer PT POC to [facility], PT; PN  completed for RTD 1/25/23  Pt to continue current HEP. See objective section for any therapeutic exercise changes, additions or modifications this date.     Therapy Time:      PT Individual Minutes  Time In: 0845  Time Out: 0930  Minutes: 45  Timed Code Treatment Minutes: 45 Minutes  Procedure Minutes: N/A   Timed Activity Minutes Units   Ther Ex 35 2   Manual  10 1     Electronically signed by Tacos Sanz PTA on 2/7/23 at 12:06 PM EST

## 2023-02-09 ENCOUNTER — HOSPITAL ENCOUNTER (OUTPATIENT)
Dept: PHYSICAL THERAPY | Age: 38
Setting detail: THERAPIES SERIES
Discharge: HOME | End: 2023-02-09
Payer: COMMERCIAL

## 2023-02-09 ENCOUNTER — APPOINTMENT (OUTPATIENT)
Dept: PHYSICAL THERAPY | Age: 38
End: 2023-02-09
Payer: COMMERCIAL

## 2023-02-09 PROCEDURE — 97110 THERAPEUTIC EXERCISES: CPT

## 2023-02-09 PROCEDURE — 97140 MANUAL THERAPY 1/> REGIONS: CPT

## 2023-02-09 PROCEDURE — 97035 APP MDLTY 1+ULTRASOUND EA 15: CPT

## 2023-02-09 ASSESSMENT — PAIN DESCRIPTION - ORIENTATION: ORIENTATION: ANTERIOR

## 2023-02-09 ASSESSMENT — PAIN DESCRIPTION - LOCATION: LOCATION: SHOULDER

## 2023-02-09 ASSESSMENT — PAIN SCALES - GENERAL: PAINLEVEL_OUTOF10: 3

## 2023-02-09 ASSESSMENT — PAIN DESCRIPTION - DESCRIPTORS: DESCRIPTORS: SORE

## 2023-02-09 ASSESSMENT — PAIN DESCRIPTION - PAIN TYPE: TYPE: SURGICAL PAIN

## 2023-02-09 NOTE — PROGRESS NOTES
[address] [facility]  Outpatient Physical Therapy    Treatment Note        Date: 2023  Patient: Sven Carrasquillo  : 1985   Confirmed: Yes  MRN: [id number]  Referring Provider: Darcie Dandy, MD   Secondary Referring Provider (If applicable): none   Medical Diagnosis: Superior glenoid labrum lesion of unspecified shoulder, initial encounter [S43.439A]  Pain in unspecified shoulder [M25.519]  Encounter for procedure for purposes other than remedying health state, unspecified [Z41.9]    Treatment Diagnosis: R shoulder A/PROM, decreased R UE strength, and increased R shoulder pain which is preventing pt from returning to work at [address]:  Insurance: Payor: Mitch Almonte 150 / Plan: [address] [place] [address] / Product Type: *No Product type* /   PT Visit Information  Onset Date: 22  PT Insurance Information: BCBS  Total # of Visits Approved: 25 (25 then auth; 60 per sridhar yr combined PT/OT/ST)  Total # of Visits to Date: 14  No Show: 0  Canceled Appointment: 0  Progress Note Counter:   Referring Provider (secondary): none    Subjective Information:  Subjective: Pt continues to express concerns w/ ongoing Rt shldr pain and limited use of Rt shldr stating \"I think there may be more going on in there. \" Pt scheduled to F/U w/ MD on 2/15/23.   HEP Compliance:  [x] Good [] Fair [] Poor [] Reports not doing due to:    Treatment:  Exercises:  Exercises  Exercise 2: pulley X 3 min ea (flex/scap), UBE 3' F/R level 1.5  Exercise 3: standing wand ext and IR up back 5\"x10 ea  Exercise 5: Standing shldr flex/scap and ABD x10 ea (1# flexion) Min discomfort  Exercise 6: Ball stabs at wall x10 ea (4 way)  Exercise 7: Supine PNF w/ YTB: D1 and D2 ext x10 ea, ABC's w/ YTB x1 set  Exercise 8: Reactive isometrics: TB shldr flex/ext, IR/ER (at neutral) 5\"x10 ea Rt with RTB, add ABD/ADD 5\"x10 ea  Exercise 9: Finger ladder: flex L30, ABD L28  Exercise 20: HEP: Continue current     Manual: Manual Therapy  Soft Tissue Mobilizaton: ant/post shldr  Other: PROM- all planes - improved abilities to relax     Modalities:  Cryotherapy (CPT 02125)  Patient Position: Seated  Number Minutes Cryotherapy: 10 min  Cryotherapy location: Right, Shoulder  Ultrasound (CPT 24812)  Patient Position: Supine  Ultrasound location: Right, Shoulder  Ultrasound specified location: ant GHJ  Ultrasound frequency: 1 MHz  Ultrasound intensity (W/cm2): 1.2   *Indicates exercise, modality, or manual techniques to be initiated when appropriate    Objective Measures:      Strength: [x] NT  [] MMT completed:     ROM: [x] NT  [] ROM measurements:         Assessment: Body Structures, Functions, Activity Limitations Requiring Skilled Therapeutic Intervention: Decreased functional mobility , Decreased ROM, Decreased strength, Decreased endurance, Increased pain  Assessment: Cont'd ROM and GHJ strengthening withing meng. Noted painful arcs in both flexion and scaption planes of motions ~90 deg elevation in which pt notes to be persistant w/ cont'd need to favor Rt UE upon most daily tasks. Initiated US upon approval of on site PT to ant shldr jt to further address pain prior to anticipated injection next week. .PN to be completed NV in preparation for RTD 2/15/23. Treatment Diagnosis: R shoulder A/PROM, decreased R UE strength, and increased R shoulder pain which is preventing pt from returning to work at [address] [place] [address] Prognosis: Good     Post-Pain Assessment:       Pain Rating (0-10 pain scale):   2/10   Location and pain description same as pre-treatment unless indicated. Action: [] NA   [x] Perform HEP  [] Meds as prescribed  [x] Modalities as prescribed   [] Call Physician     GOALS   Patient Goal(s): Patient Goals : \"throw a baseball\"    Short Term Goals Completed by 4 wks Goal Status   STG 1 Pt will demonstrate improved A/PROM >/= 5-10 deg for return to normalized motion.  In progress   STG 2 Pt will demosntrate improved R UE/shoulder strength >/= 4-/5 for return to lifting and return to work without restrictions. In progress, Partially met     Long Term Goals Completed by 8 wks Goal Status   LTG 1 Pt will demonstrate indep and 100% compliance with HEP for self management of symptoms and return to work. In progress   LTG 2 Pt will demonstrate improved score on UEFS >/= 50/80 for improved quality of life. In progress   LTG 3 Pt will demonstrate decreased R shoulder pain </= 2/10 during use of R UE. In progress       Plan:  Frequency/Duration:  Plan  Plan Frequency: 2xs/wk  Plan weeks: 8 wks  Specific Instructions for Next Treatment: Cont US  Current Treatment Recommendations: Strengthening, ROM, Functional mobility training, Endurance training, Neuromuscular re-education, Manual, Pain management, Return to work related activity, Home exercise program, Safety education & training, Patient/Caregiver education & training, Equipment evaluation, education, & procurement, Modalities, Positioning, Therapeutic activities  Additional Comments: transfer PT POC to [facility], PT  Pt to continue current HEP. See objective section for any therapeutic exercise changes, additions or modifications this date.     Therapy Time:      PT Individual Minutes  Time In: 4090  Time Out: 0940  Minutes: 50  Timed Code Treatment Minutes: 40 Minutes  Procedure Minutes: 10 min (CP)   Timed Activity Minutes Units   Ther Ex 25 2   US 8 1   Manual  7 0     Electronically signed by Oksana Jason PTA on 2/9/23 at 12:50 PM EST

## 2023-02-14 ENCOUNTER — HOSPITAL ENCOUNTER (OUTPATIENT)
Dept: PHYSICAL THERAPY | Age: 38
Setting detail: THERAPIES SERIES
Discharge: HOME | End: 2023-02-14
Payer: COMMERCIAL

## 2023-02-14 ENCOUNTER — APPOINTMENT (OUTPATIENT)
Dept: PHYSICAL THERAPY | Age: 38
End: 2023-02-14
Payer: COMMERCIAL

## 2023-02-14 PROCEDURE — 97110 THERAPEUTIC EXERCISES: CPT

## 2023-02-14 PROCEDURE — 97035 APP MDLTY 1+ULTRASOUND EA 15: CPT

## 2023-02-14 ASSESSMENT — PAIN SCALES - GENERAL: PAINLEVEL_OUTOF10: 5

## 2023-02-14 ASSESSMENT — PAIN DESCRIPTION - LOCATION: LOCATION: SHOULDER

## 2023-02-14 ASSESSMENT — PAIN DESCRIPTION - DESCRIPTORS: DESCRIPTORS: SORE

## 2023-02-14 ASSESSMENT — PAIN DESCRIPTION - PAIN TYPE: TYPE: SURGICAL PAIN

## 2023-02-14 ASSESSMENT — PAIN DESCRIPTION - ORIENTATION: ORIENTATION: ANTERIOR

## 2023-02-14 NOTE — PROGRESS NOTES
[address] [facility]  Outpatient Physical Therapy    Treatment Note        Date: 2/15/2023  Patient: Stacy Duval  : 1985   Confirmed: Yes  MRN: [id number]  Referring Provider: Elfego Martinez MD   Secondary Referring Provider (If applicable):     Medical Diagnosis: Superior glenoid labrum lesion of unspecified shoulder, initial encounter [S43.439A]  Pain in unspecified shoulder [M25.519]  Encounter for procedure for purposes other than remedying health state, unspecified [Z41.9]    Treatment Diagnosis: R shoulder A/PROM, decreased R UE strength, and increased R shoulder pain which is preventing pt from returning to work at [address]:  Insurance: Payor: Vaishali Alanis / Plan: [address] [place] [address] / Product Type: *No Product type* /   PT Visit Information  Onset Date: 22  PT Insurance Information: BCBS  Total # of Visits Approved: 25  Total # of Visits to Date: 15  No Show: 0  Canceled Appointment: 0  Progress Note Counter: 15/16    Subjective Information:  Subjective: Pt continues to report no improvement in right shoulder pain.   HEP Compliance:  [x] Good [] Fair [] Poor [] Reports not doing due to:    Pain Screening  Patient Currently in Pain: Yes  Pain Assessment: 0-10  Pain Level: 5  Best Pain Level: 2  Pain Type: Surgical pain  Pain Location: Shoulder  Pain Orientation: Anterior  Pain Descriptors: Sore    Treatment:  Exercises:  Exercises  Exercise 1: MMT/ROM measures  Exercise 2: pulley X 3 min ea (flex/scap), UBE 3' F/R level 1.5  Exercise 3: standing wand ext and IR up back 5\"x10 ea  Exercise 5: Standing shldr flex/scap and ABD x10 ea (1# flexion) Min discomfort  Exercise 8: Reactive isometrics: TB shldr flex/ext, IR/ER (at neutral) 5\"x10 ea Rt with RTB, add ABD/ADD 5\"x10 ea  Exercise 20: HEP: Continue current       Modalities:  Ultrasound (CPT C6848759)  Patient Position: Supine  Ultrasound location: Right, Shoulder  Ultrasound specified location: ant GHJ  Ultrasound frequency: 1 MHz  Ultrasound intensity (W/cm2): 1.2       *Indicates exercise, modality, or manual techniques to be initiated when appropriate    Objective Measures:      Strength: [] NT  [x] MMT completed:     Strength RUE  R Shoulder Flexion: 4-/5  R Shoulder Extension: 4/5  R Shoulder ABduction: 4/5  R Shoulder Internal Rotation: 4/5  R Shoulder External Rotation: 4-/5  R Elbow Flexion: 4+/5  R Elbow Extension: 4+/5        ROM: [] NT  [x] ROM measurements:        AROM RUE (degrees)  R Shoulder Flexion (0-180): 155 deg, scaption 162 deg  R Shoulder Extension (0-45): 60 deg  R Shoulder ABduction (0-180): 177 deg with pain  R Shoulder Int Rotation  (0-70): T10  R Shoulder Ext Rotation (0-90): T4      Assessment: Body Structures, Functions, Activity Limitations Requiring Skilled Therapeutic Intervention: Decreased functional mobility , Decreased ROM, Decreased strength, Decreased endurance, Increased pain  Assessment: Pt goals assessed this date for PN in preperation for RTD 2/15/2023. Pt shows improvement in ROM and strength but reports painful arch during flexion and scaption. Pt verbalizing pain contsistent at 3-4 and does not go any lower. Pt reporting he feels slowed progress, UEFI improves 5 pts. Initated US to ant GHJ  Treatment Diagnosis: R shoulder A/PROM, decreased R UE strength, and increased R shoulder pain which is preventing pt from returning to work at [address] [place] [address] Prognosis: Good        Post-Pain Assessment:       Pain Rating (0-10 pain scale):   4/10   Location and pain description same as pre-treatment unless indicated. Action: [] NA   [x] Perform HEP  [] Meds as prescribed  [] Modalities as prescribed   [] Call Physician     GOALS   Patient Goal(s): Patient Goals : \"throw a baseball\"    Short Term Goals Completed by 4 wks Goal Status   STG 1 Pt will demonstrate improved A/PROM >/= 5-10 deg for return to normalized motion.  Partially met, In progress   STG 2 Pt will demosntrate improved R UE/shoulder strength >/= 4-/5 for return to lifting and return to work without restrictions. In progress, Partially met       Long Term Goals Completed by 8 wks Goal Status   LTG 1 Pt will demonstrate indep and 100% compliance with HEP for self management of symptoms and return to work. In progress   LTG 2 Pt will demonstrate improved score on UEFS >/= 50/80 for improved quality of life. In progress   LTG 3 Pt will demonstrate decreased R shoulder pain </= 2/10 during use of R UE. In progress            Plan:  Frequency/Duration:  Plan  Plan Frequency: 2xs/wk  Plan weeks: 8 wks  Specific Instructions for Next Treatment: Cont US  Current Treatment Recommendations: Strengthening, ROM, Functional mobility training, Endurance training, Neuromuscular re-education, Manual, Pain management, Return to work related activity, Home exercise program, Safety education & training, Patient/Caregiver education & training, Equipment evaluation, education, & procurement, Modalities, Positioning, Therapeutic activities  Additional Comments: transfer PT POC to [facility], PT  Pt to continue current HEP. See objective section for any therapeutic exercise changes, additions or modifications this date.     Therapy Time:      PT Individual Minutes  Time In: 0848  Time Out: 0930  Minutes: 42  Timed Code Treatment Minutes: 42 Minutes  Procedure Minutes:0  Timed Activity Minutes Units   Ther Ex 34 2   US 8 1     Electronically signed by Arvind Osborne PTA on 2/14/23 at 11:20 AM EST

## 2023-02-14 NOTE — PROGRESS NOTES
Toyin hensley, [address]     Ph: [phone]  Fax: [phone]      [] Certification  [] Recertification []  Plan of Care  [x] Progress Note [] Discharge      Referring Provider: Lachelle Marie MD     From:  Mely Butler, PT  Patient: Kacy Santiago (47 y.o. male) : 1985 Date: 2/15/2023  Medical Diagnosis: Superior glenoid labrum lesion of unspecified shoulder, initial encounter [S43.439A]  Pain in unspecified shoulder [M25.519]  Encounter for procedure for purposes other than remedying health state, unspecified [Z41.9]       Treatment Diagnosis: R shoulder A/PROM, decreased R UE strength, and increased R shoulder pain which is preventing pt from returning to work at [employer] of Care/Certification Expiration Date: :     Progress Report Period from: 2023  to 2/15/2023    Visits to Date: 15 No Show: 0 Cancelled Appts: 0    OBJECTIVE:   Short Term Goals - Time Frame for Short Term Goals: 4 wks    Goals Current/Discharge status  Status   Short Term Goal 1: Pt will demonstrate improved A/PROM >/= 5-10 deg for return to normalized motion. AROM RUE (degrees)  R Shoulder Flexion (0-180): 155 deg, scaption 162 deg  R Shoulder Extension (0-45): 60 deg  R Shoulder ABduction (0-180): 177 deg with pain  R Shoulder Int Rotation  (0-70): T10  R Shoulder Ext Rotation (0-90): T4     Partially met, In progress   Short Term Goal 2: Pt will demosntrate improved R UE/shoulder strength >/= 4-/5 for return to lifting and return to work without restrictions.      Strength RUE  R Shoulder Flexion: 4-/5  R Shoulder Extension: 4/5  R Shoulder ABduction: 4/5  R Shoulder Internal Rotation: 4/5  R Shoulder External Rotation: 4-/5  R Elbow Flexion: 4+/5  R Elbow Extension: 4+/5     In progress, Partially met     Long Term Goals - Time Frame for Long Term Goals : 8 wks  Goals Current/ Discharge status Status   Long Term Goal 1: Pt will demonstrate indep and 100% compliance with HEP for self management of symptoms and return to work. Ind w/ current porgram In progress   Long Term Goal 2: Pt will demonstrate improved score on UEFS >/= 50/80 for improved quality of life. UEFS 47/80 In progress   Long Term Goal 3: Pt will demonstrate decreased R shoulder pain </= 2/10 during use of R UE. Pt reports right shoulder pain consistently 3-4/10 In progress       Body Structures, Functions, Activity Limitations Requiring Skilled Therapeutic Intervention: Decreased functional mobility , Decreased ROM, Decreased strength, Decreased endurance, Increased pain  Assessment: Pt goals assessed this date for PN in preperation for RTD 2/15/2023.  Pt shows improvement in ROM and strength but reports painful arch during flexion and scaption.  Pt verbalizing pain contsistent at 3-4 and does not go any lower.  Pt reporting he feels slowed progress, UEFI improves 5 pts.  Initated US to ant GHJ  Specific Instructions for Next Treatment: Cont US  Therapy Prognosis: Good    PLAN: [] Evaluate and Treat  Frequency/Duration:  Plan Frequency: 2xs/wk  Plan weeks: 4 wks  Specific Instructions for Next Treatment: Cont US  Current Treatment Recommendations: Strengthening, ROM, Functional mobility training, Endurance training, Neuromuscular re-education, Manual, Pain management, Return to work related activity, Home exercise program, Safety education & training, Patient/Caregiver education & training, Equipment evaluation, education, & procurement, Modalities, Positioning, Therapeutic activities  Additional Comments: transfer PT POC to Michelle Doe PT     Precautions:                            Patient Status:[x] Continue/ Initiate plan of Care    [] Discharge PT.  Recommend pt continue with HEP.     [] Additional visits requested, Please re-certify for additional visits:    [] Hold         Signature: Objective inforimation by: Electronically signed by Stella James PTA  on 2/14/23 at 11:24 AM EST  Electronically signed by Theo Cardozo PT on 2/19/23 at 9:55 PM EST        If you have any questions or concerns, please don't hesitate to call. Thank you for your referral.    I have reviewed this plan of care and certify a need for medically necessary rehabilitation services.     Physician Signature:__________________________________________________________  Date:  Please sign and return

## 2023-02-16 ENCOUNTER — APPOINTMENT (OUTPATIENT)
Dept: PHYSICAL THERAPY | Age: 38
End: 2023-02-16
Payer: COMMERCIAL

## 2023-02-16 NOTE — PROGRESS NOTES
Therapy                            Cancellation/No-show Note    Date: 2023  Patient: Jason Pettit (1 y.o. male)  : 1985  MRN:  [id number]  Referring Physician: Virgie Taylor MD    Medical Diagnosis: Superior glenoid labrum lesion of unspecified shoulder, initial encounter [S43.439A]  Pain in unspecified shoulder [M25.519]  Encounter for procedure for purposes other than remedying health state, unspecified [Z41.9]      Visit Information:  Insurance: Payor: Mitch Almonte 150 / Plan: [facility] / Product Type: *No Product type* /   Visits to Date: 15   No Show/Cancelled Appts: 0 / 0      [] Pt has future appointments scheduled, no follow up needed  [x] Pt requests to be on hold.     Reason: pt to have a shoulder injection and then MRI   If > 2 weeks please discuss with therapist.  [] Therapist to call pt for follow up    Pt to call for continued f/u appts  Signature: Electronically signed by Gabrielle Luna PT on 23 at 2:18 PM EST

## 2023-02-25 DIAGNOSIS — N52.9 ERECTILE DYSFUNCTION, UNSPECIFIED ERECTILE DYSFUNCTION TYPE: ICD-10-CM

## 2023-02-26 NOTE — TELEPHONE ENCOUNTER
Requesting medication refill.  Please approve or deny this request.    Rx requested:  Requested Prescriptions     Pending Prescriptions Disp Refills    tadalafil (CIALIS) 5 MG tablet [Pharmacy Med Name: TADALAFIL 5 MG TABLET] 90 tablet 1     Sig: TAKE 1 TABLET BY MOUTH EVERY DAY       Last Office Visit:   1/23/2023    Next Visit Date:  Future Appointments   Date Time Provider Eugene Franco   7/28/2023  9:30 AM Nancy Varghese MD [facility] [place]

## 2023-02-27 RX ORDER — TADALAFIL 5 MG/1
TABLET ORAL
Qty: 90 TABLET | Refills: 1 | Status: SHIPPED | OUTPATIENT
Start: 2023-02-27

## 2023-02-28 DIAGNOSIS — F32.A ANXIETY AND DEPRESSION: ICD-10-CM

## 2023-02-28 DIAGNOSIS — F41.9 ANXIETY AND DEPRESSION: ICD-10-CM

## 2023-02-28 RX ORDER — LAMOTRIGINE 200 MG/1
TABLET, EXTENDED RELEASE ORAL
Qty: 90 TABLET | Refills: 1 | Status: SHIPPED | OUTPATIENT
Start: 2023-02-28

## 2023-02-28 NOTE — TELEPHONE ENCOUNTER
Comments:     Last Office Visit (last PCP visit):   1/23/2023    Next Visit Date:  Future Appointments   Date Time Provider Eugene Franco   7/28/2023  9:30 AM Swathi Newby MD [facility]       **If hasn't been seen in over a year OR hasn't followed up according to last diabetes/ADHD visit, make appointment for patient before sending refill to provider.     Rx requested:  Requested Prescriptions     Pending Prescriptions Disp Refills    lamoTRIgine (LAMICTAL XR) 200 MG TB24 extended release tablet [Pharmacy Med Name: LAMOTRIGINE  MG TABLET] 90 tablet 1     Sig: TAKE 1 TABLET BY MOUTH EVERY DAY

## 2023-03-01 ENCOUNTER — TELEPHONE (OUTPATIENT)
Dept: INTERVENTIONAL RADIOLOGY/VASCULAR | Age: 38
End: 2023-03-01

## 2023-03-01 NOTE — TELEPHONE ENCOUNTER
Patient was given this information via phone conversation - voiced understanding  2. Spoke to Amando in [address]: 3/10/2023 @ 10:00 am (no MRI/CT)  >  You will need to arrive at 9:30 am from home and check in at the Diagnostic Imaging Check In desk.

## 2023-03-03 ENCOUNTER — TELEPHONE (OUTPATIENT)
Dept: INTERVENTIONAL RADIOLOGY/VASCULAR | Age: 38
End: 2023-03-03

## 2023-03-08 DIAGNOSIS — E29.1 HYPOGONADISM IN MALE: ICD-10-CM

## 2023-03-08 NOTE — TELEPHONE ENCOUNTER
Requesting medication refill. Please approve or deny this request.    Rx requested:  Requested Prescriptions     Pending Prescriptions Disp Refills    testosterone cypionate (DEPOTESTOTERONE CYPIONATE) 200 MG/ML injection 6 mL 1     Sig: Inject 1 mL into the muscle every 14 days for 180 days.        Last Office Visit:   1/23/2023    Next Visit Date:  Future Appointments   Date Time Provider Eugene Franco   7/28/2023  9:30 AM Mary Jo Barclay MD [address]

## 2023-03-09 RX ORDER — TESTOSTERONE CYPIONATE 200 MG/ML
200 INJECTION INTRAMUSCULAR
Qty: 6 ML | Refills: 1 | Status: SHIPPED | OUTPATIENT
Start: 2023-03-09 | End: 2023-09-05

## 2023-03-15 ENCOUNTER — TELEPHONE (OUTPATIENT)
Dept: INTERVENTIONAL RADIOLOGY/VASCULAR | Age: 38
End: 2023-03-15

## 2023-03-15 NOTE — TELEPHONE ENCOUNTER
Patient was given this information via phone conversation - voiced understanding  2. Spoke to [employer] in [address]: 3/24/2023 @ 10:00 am  >  You will need to arrive at 9:30 am from home and check in at the Diagnostic Imaging Check In desk.

## 2023-03-24 ENCOUNTER — HOSPITAL ENCOUNTER (OUTPATIENT)
Dept: INTERVENTIONAL RADIOLOGY/VASCULAR | Age: 38
End: 2023-03-24
Payer: COMMERCIAL

## 2023-03-24 ENCOUNTER — TELEPHONE (OUTPATIENT)
Dept: INTERVENTIONAL RADIOLOGY/VASCULAR | Age: 38
End: 2023-03-24

## 2023-03-24 VITALS
RESPIRATION RATE: 16 BRPM | DIASTOLIC BLOOD PRESSURE: 87 MMHG | HEART RATE: 72 BPM | OXYGEN SATURATION: 100 % | SYSTOLIC BLOOD PRESSURE: 142 MMHG

## 2023-03-24 DIAGNOSIS — M75.81 TENDINITIS OF RIGHT ROTATOR CUFF: ICD-10-CM

## 2023-03-24 PROCEDURE — 2709999900 IR ARTHR/ASP/INJ MAJOR JT/BURSA RIGHT WO US

## 2023-03-24 PROCEDURE — 6360000002 HC RX W HCPCS: Performed by: RADIOLOGY

## 2023-03-24 PROCEDURE — 6360000004 HC RX CONTRAST MEDICATION: Performed by: RADIOLOGY

## 2023-03-24 PROCEDURE — 20610 DRAIN/INJ JOINT/BURSA W/O US: CPT

## 2023-03-24 PROCEDURE — 77002 NEEDLE LOCALIZATION BY XRAY: CPT

## 2023-03-24 PROCEDURE — 2500000003 HC RX 250 WO HCPCS: Performed by: RADIOLOGY

## 2023-03-24 PROCEDURE — 20610 DRAIN/INJ JOINT/BURSA W/O US: CPT | Performed by: RADIOLOGY

## 2023-03-24 RX ORDER — TRIAMCINOLONE ACETONIDE 40 MG/ML
INJECTION, SUSPENSION INTRA-ARTICULAR; INTRAMUSCULAR
Status: COMPLETED | OUTPATIENT
Start: 2023-03-24 | End: 2023-03-24

## 2023-03-24 RX ORDER — LIDOCAINE HYDROCHLORIDE 20 MG/ML
INJECTION, SOLUTION INFILTRATION; PERINEURAL
Status: COMPLETED | OUTPATIENT
Start: 2023-03-24 | End: 2023-03-24

## 2023-03-24 RX ORDER — BUPIVACAINE HYDROCHLORIDE 5 MG/ML
INJECTION, SOLUTION PERINEURAL
Status: COMPLETED | OUTPATIENT
Start: 2023-03-24 | End: 2023-03-24

## 2023-03-24 RX ADMIN — IOPAMIDOL 3 ML: 408 INJECTION, SOLUTION INTRATHECAL at 10:11

## 2023-03-24 RX ADMIN — LIDOCAINE HYDROCHLORIDE 8 ML: 20 INJECTION, SOLUTION INFILTRATION; PERINEURAL at 10:10

## 2023-03-24 RX ADMIN — TRIAMCINOLONE ACETONIDE 80 MG: 40 INJECTION, SUSPENSION INTRA-ARTICULAR; INTRAMUSCULAR at 10:12

## 2023-03-24 RX ADMIN — BUPIVACAINE HYDROCHLORIDE 3 ML: 5 INJECTION, SOLUTION PERINEURAL at 10:12

## 2023-03-24 NOTE — TELEPHONE ENCOUNTER
Patient was given this information via phone conversation - voiced understanding  2. Spoke to Mathews in [address]: 3/31/2023 @ 9:00 am  >  You will need to arrive at 8:30 am from home and check in at the Diagnostic Imaging Check In desk.

## 2023-03-24 NOTE — OR NURSING
NO SEDATION      Pt brought to specials via ambulation, gait steady. Pt A&Ox4, respiration even and unlabored, skin p/w/d. Pt removed shirt independently, politely refused gown. Minimally assisted pt onto table in supine position. States pain 6/10. History, allergies and medications verified. VSS. Procedure explained and consent signed. Dr Martínez Nowak marked site using fluoro imaging. Site cleansed, prepped and draped in a sterile fashion. Time out performed for Fluoroscopy guided RIGHT shoulder steroid injection. 2% lidocaine used to numb procedure site, see eMar.     Spinal needle 20G x 3.5\" inserted and contrast given to confirm placement using fluoro guidance, see eMar.     Medication cocktail instilled through spinal needle under fluoro guidance, see eMar.     Pt tolerating procedure well and verbal support given throughout. Needle removed, band aid applied, no bleeding noted. Pt assisted off table. States pain has improved. Discharge instructions provided by LR-RN and pt verbalized understanding. Pt changed back into street shirt with minimal assistance. Accompanied pt to lobby for d/c home with belongings. Electronically signed by Kashmir Parsons RN on 3/24/2023 at 10:17 AM

## 2023-03-24 NOTE — DISCHARGE INSTRUCTIONS
follow all instructions on the label. INSTRUCTIONS OR COMMENTS RELATIVE TO SPECIFIC EXAM PERFORMED:   - Put ice or a cold pack on the area for 10 to 20 minutes at a time. Put a thin cloth between the ice and your skin. - You may have some soreness or swelling in the joint.  - You may hear your joint click or make other noises for a few days. Call your doctor now or seek immediate medical care if:    You have signs of infection, such as: Increased pain, swelling, warmth, or redness. Red streaks leading from the site. Pus draining from the site. A fever, greater than 100 degrees F  For large amount of bleeding or drainage. Please contact Dr. Jennifer Mason office with any questions or concerns post procedure at [phone]. You may also call [phone] and ask to speak with a Radiology RN. If you are unable to contact the above physician and you feel you have a major problem, please go to the Emergency Room for treatment.

## 2023-03-31 ENCOUNTER — HOSPITAL ENCOUNTER (OUTPATIENT)
Dept: INTERVENTIONAL RADIOLOGY/VASCULAR | Age: 38
End: 2023-03-31
Payer: COMMERCIAL

## 2023-03-31 VITALS
HEART RATE: 86 BPM | DIASTOLIC BLOOD PRESSURE: 69 MMHG | RESPIRATION RATE: 16 BRPM | OXYGEN SATURATION: 99 % | SYSTOLIC BLOOD PRESSURE: 144 MMHG

## 2023-03-31 PROCEDURE — 20610 DRAIN/INJ JOINT/BURSA W/O US: CPT

## 2023-03-31 PROCEDURE — 6360000002 HC RX W HCPCS: Performed by: RADIOLOGY

## 2023-03-31 PROCEDURE — 6360000004 HC RX CONTRAST MEDICATION: Performed by: RADIOLOGY

## 2023-03-31 PROCEDURE — 77002 NEEDLE LOCALIZATION BY XRAY: CPT

## 2023-03-31 PROCEDURE — 2500000003 HC RX 250 WO HCPCS: Performed by: RADIOLOGY

## 2023-03-31 PROCEDURE — 20610 DRAIN/INJ JOINT/BURSA W/O US: CPT | Performed by: RADIOLOGY

## 2023-03-31 RX ORDER — LIDOCAINE HYDROCHLORIDE 20 MG/ML
INJECTION, SOLUTION INFILTRATION; PERINEURAL
Status: COMPLETED | OUTPATIENT
Start: 2023-03-31 | End: 2023-03-31

## 2023-03-31 RX ORDER — TRIAMCINOLONE ACETONIDE 40 MG/ML
INJECTION, SUSPENSION INTRA-ARTICULAR; INTRAMUSCULAR
Status: COMPLETED | OUTPATIENT
Start: 2023-03-31 | End: 2023-03-31

## 2023-03-31 RX ORDER — BUPIVACAINE HYDROCHLORIDE 5 MG/ML
INJECTION, SOLUTION PERINEURAL
Status: COMPLETED | OUTPATIENT
Start: 2023-03-31 | End: 2023-03-31

## 2023-03-31 RX ADMIN — LIDOCAINE HYDROCHLORIDE 8 ML: 20 INJECTION, SOLUTION INFILTRATION; PERINEURAL at 10:42

## 2023-03-31 RX ADMIN — TRIAMCINOLONE ACETONIDE 80 MG: 40 INJECTION, SUSPENSION INTRA-ARTICULAR; INTRAMUSCULAR at 10:44

## 2023-03-31 RX ADMIN — BUPIVACAINE HYDROCHLORIDE 3 ML: 5 INJECTION, SOLUTION PERINEURAL at 10:44

## 2023-03-31 RX ADMIN — IOPAMIDOL 5 ML: 408 INJECTION, SOLUTION INTRATHECAL at 10:43

## 2023-03-31 NOTE — OR NURSING
NO SEDATION    1010 - Pt brought to specials, A&Ox4. Procedure explained and consent signed, patient reports he just had right shoulder injection last week and knows what to expect. Patient positioned onto table in supine position. States pain 5/10 in left shoulder. Allergies and medications verified - patient reports not taking a blood thinner. VSS, on RA    1039 - Dr Mercedes Valdez arrived and marked site using fluoro imaging. 1041 - Time out performed for procedure. Site cleansed, prepped and draped in a sterile fashion. 1042 - 2% lidocaine used to numb procedure site, see eMar.     1043 - Spinal needle 20G x 3.5\" inserted and contrast given to confirm placement using fluoro guidance, see eMar.     1044 - Medication cocktail instilled through spinal needle under fluoro guidance, see eMar. Pt tolerating procedure well and verbal support given throughout. Needle removed, band aid applied, no bleeding noted. 1047 - Pt assisted off table. States pain has improved. Discharge instructions provided and pt verbalized understanding. Patient with steady ambulatory gait.  Electronically signed by Victor Hugo Chandler RN on 3/31/2023 at 10:48 AM

## 2023-03-31 NOTE — DISCHARGE INSTRUCTIONS
Joint Injections: Care Instructions  Overview     Joint injections are shots into a joint, such as the knee. They may be used to put in medicines, such as pain relievers. A corticosteroid, or steroid, shot is used to reduce inflammation in tendons or joints. It is often used to treat problems such as arthritis, tendinitis, and bursitis. Steroids can be injected directly into a painful, inflamed joint. They can also help reduce inflammation of a bursa. A bursa is a sac of fluid. It cushions and lubricates areas where tendons, ligaments, skin, muscles, or bones rub against each other. A steroid shot can sometimes help with short-term pain relief when other treatments haven't worked. If steroid shots help, pain may improve for weeks or months. Follow-up care is a key part of your treatment and safety. Be sure to make and go to all appointments, and call your doctor if you are having problems. It's also a good idea to know your test results and keep a list of the medicines you take. How can you care for yourself at home? Ask your doctor if you can take an over-the-counter pain medicine, such as acetaminophen (Tylenol), ibuprofen (Advil, Motrin), or naproxen (Aleve). Avoid strenuous activities for several days. In particular, avoid ones that put stress on the area where you got the shot. If you have dressings over the area, keep them clean and dry. You may remove them when your doctor tells you to. What happens after the test?    Activity:  Rest, avoid strenuous activity such as bending or lifting heavy objects for at least 24 hours. Be aware that your leg or arm (depending on injection site) may feel heavy for up to 4 hours. May shower, bathe, or swim after 24 hours. Diet:  Resume usual diet    Medication:  * Resume home medication unless otherwise instructed by your physician. * May take mild pain reliever, as needed, such as Acetaminophen or Ibuprofen. Be safe with medicines.  Read and follow all instructions on the label. INSTRUCTIONS OR COMMENTS RELATIVE TO SPECIFIC EXAM PERFORMED:   - Put ice or a cold pack on the area for 10 to 20 minutes at a time. Put a thin cloth between the ice and your skin. - You may have some soreness or swelling in the joint.  - You may hear your joint click or make other noises for a few days. Call your doctor now or seek immediate medical care if:    You have signs of infection, such as: Increased pain, swelling, warmth, or redness. Red streaks leading from the site. Pus draining from the site. A fever, greater than 100 degrees F  For large amount of bleeding or drainage. Please contact Dr. Sherman Olivera office with any questions or concerns post procedure at [phone]. You may also call [phone] and ask to speak with a Radiology RN. If you are unable to contact the above physician and you feel you have a major problem, please go to the Emergency Room for treatment.

## 2023-04-20 ENCOUNTER — HOSPITAL ENCOUNTER (OUTPATIENT)
Dept: PHYSICAL THERAPY | Age: 38
Setting detail: THERAPIES SERIES
Discharge: HOME | End: 2023-04-20
Payer: COMMERCIAL

## 2023-04-20 DIAGNOSIS — K21.9 GASTRO-ESOPHAGEAL REFLUX DISEASE WITHOUT ESOPHAGITIS: ICD-10-CM

## 2023-04-20 PROCEDURE — 97162 PT EVAL MOD COMPLEX 30 MIN: CPT

## 2023-04-20 PROCEDURE — 97110 THERAPEUTIC EXERCISES: CPT

## 2023-04-20 RX ORDER — OMEPRAZOLE 40 MG/1
CAPSULE, DELAYED RELEASE ORAL
Qty: 90 CAPSULE | Refills: 2 | Status: SHIPPED | OUTPATIENT
Start: 2023-04-20

## 2023-04-20 ASSESSMENT — PAIN DESCRIPTION - DESCRIPTORS: DESCRIPTORS: SHARP

## 2023-04-20 ASSESSMENT — PAIN DESCRIPTION - PAIN TYPE: TYPE: CHRONIC PAIN;SURGICAL PAIN

## 2023-04-20 ASSESSMENT — PAIN SCALES - GENERAL: PAINLEVEL_OUTOF10: 3

## 2023-04-20 ASSESSMENT — PAIN DESCRIPTION - ORIENTATION: ORIENTATION: RIGHT;LEFT;ANTERIOR

## 2023-04-20 ASSESSMENT — PAIN DESCRIPTION - LOCATION: LOCATION: SHOULDER

## 2023-04-20 NOTE — TELEPHONE ENCOUNTER
Comments:     Last Office Visit (last PCP visit):   1/23/2023    Next Visit Date:  Future Appointments   Date Time Provider Eugene Franco   7/28/2023  9:30 AM John Canela MD [facility]       **If hasn't been seen in over a year OR hasn't followed up according to last diabetes/ADHD visit, make appointment for patient before sending refill to provider.     Rx requested:  Requested Prescriptions     Pending Prescriptions Disp Refills    omeprazole (PRILOSEC) 40 MG delayed release capsule [Pharmacy Med Name: OMEPRAZOLE DR 40 MG CAPSULE] 90 capsule 2     Sig: TAKE 1 CAPSULE BY MOUTH EVERY DAY

## 2023-04-20 NOTE — PROGRESS NOTES
Shoulder Horizontal ABduction: 3/5  L Elbow Flexion: 4-/5  L Elbow Extension: 4-/5       Strength RUE  R Shoulder Flexion: 3+/5  R Shoulder Extension: 4/5  R Shoulder ABduction: 3+/5  R Shoulder Internal Rotation: 4/5 (in neutral)  R Shoulder External Rotation: 4/5 (in neutral)  R Shoulder Horizontal ABduction: 3+/5  R Elbow Flexion: 4/5  R Elbow Extension: 4/5   New     Long Term Goals - Time Frame for Long Term Goals : 6 wks  Goals Current/ Discharge status Status   Long Term Goal 1: Pt will demonstrate indep and 100% compliance with HEP for self management of symptoms and return to work. HEP initiated New   Long Term Goal 2: Pt will demonstrate improved score on UEFS >/= 65/80 for improved quality of life. UEFS 57/80   New   Long Term Goal 3: Pt will demonstrate decreased R shoulder pain </= 2/10 during use of B UE during reaching overhead and sleeping for return to work and return to fishing. Pain Screening  Patient Currently in Pain: Yes  Pain Level: 3 (L shoulder anterior and AC joint 6/10)  Best Pain Level: 1  Worst Pain Level: 5 (L shoulder 8/10)  Pain Type: Chronic pain, Surgical pain  Pain Location: Shoulder  Pain Orientation: Right, Left, Anterior  Pain Descriptors: Sharp   New     Body Structures, Functions, Activity Limitations Requiring Skilled Therapeutic Intervention: Decreased functional mobility , Decreased ROM, Decreased strength, Decreased endurance, Increased pain, Decreased posture  Assessment: Pt is 42 y.o. male  to PT due to R shoulder SAD in dec 2022 with continued pain and ROM deficits and with L shoulder pain and ROM. Pt states underwent R shoulder injections recently and MD provided a script for PT due to pt requiring MRI of R shoulder and possible additional surgery to R shoulder in the future. Pt exhibits decreased B shoulder ROM, decreased B shoulder strength, decreased posture, decreased activity tolerance and increased pain with reaching, motion, and when sleeping in s/l.   Pt

## 2023-04-20 NOTE — PROGRESS NOTES
muscle every 14 days for 180 days. , Disp: 6 mL, Rfl: 1    lamoTRIgine (LAMICTAL XR) 200 MG TB24 extended release tablet, TAKE 1 TABLET BY MOUTH EVERY DAY, Disp: 90 tablet, Rfl: 1    tadalafil (CIALIS) 5 MG tablet, TAKE 1 TABLET BY MOUTH EVERY DAY, Disp: 90 tablet, Rfl: 1    methylPREDNISolone (MEDROL DOSEPACK) 4 MG tablet, Take by mouth., Disp: 1 kit, Rfl: 0    albuterol (PROVENTIL) (2.5 MG/3ML) 0.083% nebulizer solution, USE 1 VIAL IN NEBULIZER EVERY 6 HOURS AS NEEDED FOR WHEEZING, Disp: 150 mL, Rfl: 3    albuterol sulfate HFA (PROVENTIL;VENTOLIN;PROAIR) 108 (90 Base) MCG/ACT inhaler, INHALE 2 PUFFS BY MOUTH EVERY 6 HOURS AS NEEDED FOR WHEEZE, Disp: 6.7 each, Rfl: 5    hydrOXYzine pamoate (VISTARIL) 25 MG capsule, TAKE 1 CAPSULE BY MOUTH 3 TIMES DAILY AS NEEDED FOR ANXIETY., Disp: 30 capsule, Rfl: 2    naproxen (NAPROSYN) 500 MG tablet, TAKE 1 TABLET BY MOUTH TWICE A DAY AS NEEDED FOR PAIN, Disp: 60 tablet, Rfl: 0    omeprazole (PRILOSEC) 40 MG delayed release capsule, TAKE 1 CAPSULE BY MOUTH EVERY DAY, Disp: 90 capsule, Rfl: 2    metoprolol (LOPRESSOR) 100 MG tablet, TAKE 1 TABLET BY MOUTH TWICE A DAY, Disp: 180 tablet, Rfl: 1    fluticasone-salmeterol (ADVAIR DISKUS) 100-50 MCG/DOSE diskus inhaler, Inhale 1 puff into the lungs every 12 hours, Disp: 180 each, Rfl: 1    EPINEPHrine (EPIPEN 2-AUGUSTUS) 0.3 MG/0.3ML SOAJ injection, Inject 0.3 mLs into the muscle once as needed (allergic reaction/bee sting) Use as directed for allergic reaction Dispense 1 two pack (epi pen), Disp: 1 each, Rfl: 0  Allergies: Amlodipine, Bee venom, Compazine [prochlorperazine maleate], and Lisinopril      SUBJECTIVE EXAMINATION     History obtained from[de-identified] Patient,      Family/Caregiver Present: No    Subjective History: Onset Date: 12/08/22  Subjective: Pt states MD completed injections R shoulder  3/17 and 3/24- which decreased pain initially and pt reports pain is now increasing. MD provided script for PT prior to getting MRI.   Pt states

## 2023-04-26 ENCOUNTER — HOSPITAL ENCOUNTER (OUTPATIENT)
Dept: PHYSICAL THERAPY | Age: 38
Setting detail: THERAPIES SERIES
Discharge: HOME | End: 2023-04-26
Payer: COMMERCIAL

## 2023-04-26 PROCEDURE — 97110 THERAPEUTIC EXERCISES: CPT

## 2023-04-26 PROCEDURE — G0283 ELEC STIM OTHER THAN WOUND: HCPCS

## 2023-04-26 PROCEDURE — 97140 MANUAL THERAPY 1/> REGIONS: CPT

## 2023-04-26 ASSESSMENT — PAIN SCALES - GENERAL: PAINLEVEL_OUTOF10: 3

## 2023-04-26 ASSESSMENT — PAIN DESCRIPTION - LOCATION: LOCATION: SHOULDER

## 2023-04-26 ASSESSMENT — PAIN DESCRIPTION - DESCRIPTORS: DESCRIPTORS: SHARP

## 2023-04-26 ASSESSMENT — PAIN DESCRIPTION - ORIENTATION: ORIENTATION: RIGHT;LEFT

## 2023-04-26 NOTE — PROGRESS NOTES
[address] [facility]  Outpatient Physical Therapy    Treatment Note        Date: 2023  Patient: Maria Teresa Alanis  : 1985   Confirmed: Yes  MRN: [id number]  Referring Provider: Benson Velasquez MD   Secondary Referring Provider (If applicable):     Medical Diagnosis: Adhesive capsulitis of unspecified shoulder [M75.00]  Other shoulder lesions, unspecified shoulder [M75.80] adhesive capsulitis of shoulder; rotator cuff tendinitis  Treatment Diagnosis: R shoulder A/PROM, decreased R UE strength, and increased R shoulder pain which is preventing pt from returning to work at [address]:  Insurance: Payor: Jw Lu / Plan: [address] [place] [address] / Product Type: *No Product type* /   PT Visit Information  Onset Date: 22  PT Insurance Information: BCBS  Total # of Visits Approved: 12 (12 visits left)  Total # of Visits to Date: 2  No Show: 0  Canceled Appointment: 0  Progress Note Counter:     Subjective Information:  Subjective: Pt states \"The left one is bothering me more than the right. \" Pt w/ current pain level of 6/10 in Lt and 3/10 in Rt.   Comments: x-ray 3/15: AP axillary left shoulder shows a well-positioned glenohumeral joint  no fracture no dislocation no abnormal calcifications the subacromial  space is preserved the joint space is preserved overall unremarkable  two views left shoulder  HEP Compliance:  [x] Good [] Fair [] Poor [] Reports not doing due to:    Pain Screening  Patient Currently in Pain: Yes  Pain Level: 3 (Rt 3/10, Lt 6/10)  Pain Location: Shoulder  Pain Orientation: Right, Left  Pain Descriptors: Sharp    Treatment:  Exercises:  Exercises  Exercise 2: pulley 2 min flex/scaption  Exercise 3: cane: flex/ABD, IR up back, ER and ext 5\"x10 b/l  Exercise 4: PROM B shoulder- see manual  Exercise 5: scapular retraction 5\"x10  Exercise 6: shoulder iso/ cervical iso*  Exercise 7: UT/LS stretch 3x20\" ea (gravity only w/ hand gripped under

## 2023-04-27 ENCOUNTER — HOSPITAL ENCOUNTER (OUTPATIENT)
Dept: PHYSICAL THERAPY | Age: 38
Setting detail: THERAPIES SERIES
Discharge: HOME | End: 2023-04-27
Payer: COMMERCIAL

## 2023-04-27 PROCEDURE — 97110 THERAPEUTIC EXERCISES: CPT

## 2023-04-27 NOTE — PROGRESS NOTES
requested. Extensive pt education on working through comfortable ranges and avoiding intense pain levels. Pt declined modalities today will ice at home if needed. Treatment Diagnosis: R shoulder A/PROM, decreased R UE strength, and increased R shoulder pain which is preventing pt from returning to work at [address] [place] [address] Prognosis: Good       Patient Education: [x] NA       Post-Pain Assessment:       Pain Rating (0-10 pain scale):   6/10   Location and pain description same as pre-treatment unless indicated. Action: [] NA   [x] Perform HEP  [] Meds as prescribed  [] Modalities as prescribed   [] Call Physician     GOALS   Patient Goal(s): Patient Goals : \"throw a baseball and return to fishing\"    Short Term Goals Completed by 3 wks Goal Status   STG 1 Pt will demonstrate improved B shoulder A/PROM >/= 5-10 deg for return to normalized motion. In progress   STG 2 Pt will demosntrate improved B UE/shoulder strength >/= 4-/5 for return to lifting and return to work without restrictions. In progress     Long Term Goals Completed by 6 wks Goal Status   LTG 1 Pt will demonstrate indep and 100% compliance with HEP for self management of symptoms and return to work. In progress   LTG 2 Pt will demonstrate improved score on UEFS >/= 65/80 for improved quality of life. In progress   LTG 3 Pt will demonstrate decreased R shoulder pain </= 2/10 during use of B UE during reaching overhead and sleeping for return to work and return to fishing.  In progress       Plan:  Frequency/Duration:  Plan  Plan Frequency: 2xs/wk  Plan weeks: 6  Current Treatment Recommendations: Strengthening, ROM, Functional mobility training, Endurance training, Neuromuscular re-education, Manual, Pain management, Return to work related activity, Home exercise program, Safety education & training, Patient/Caregiver education & training, Equipment evaluation, education, & procurement, Modalities, Positioning, Therapeutic activities  Additional Comments:

## 2023-05-02 ENCOUNTER — HOSPITAL ENCOUNTER (OUTPATIENT)
Dept: PHYSICAL THERAPY | Age: 38
Setting detail: THERAPIES SERIES
Discharge: HOME | End: 2023-05-02
Payer: COMMERCIAL

## 2023-05-02 ENCOUNTER — APPOINTMENT (OUTPATIENT)
Dept: PHYSICAL THERAPY | Age: 38
End: 2023-05-02
Payer: COMMERCIAL

## 2023-05-02 PROCEDURE — 97110 THERAPEUTIC EXERCISES: CPT

## 2023-05-02 ASSESSMENT — PAIN DESCRIPTION - LOCATION: LOCATION: SHOULDER

## 2023-05-02 ASSESSMENT — PAIN SCALES - GENERAL: PAINLEVEL_OUTOF10: 3

## 2023-05-02 ASSESSMENT — PAIN DESCRIPTION - PAIN TYPE: TYPE: CHRONIC PAIN

## 2023-05-02 ASSESSMENT — PAIN DESCRIPTION - ORIENTATION: ORIENTATION: RIGHT;LEFT

## 2023-05-02 NOTE — PROGRESS NOTES
2xs/wk  Plan weeks: 6  Current Treatment Recommendations: Strengthening, ROM, Functional mobility training, Endurance training, Neuromuscular re-education, Manual, Pain management, Return to work related activity, Home exercise program, Safety education & training, Patient/Caregiver education & training, Equipment evaluation, education, & procurement, Modalities, Positioning, Therapeutic activities  Additional Comments: PT POC supervised by [place], PT  Pt to continue current HEP. See objective section for any therapeutic exercise changes, additions or modifications this date.     Therapy Time:      PT Individual Minutes  Time In: 1430  Time Out: 8239  Minutes: 41  Timed Code Treatment Minutes: 41 Minutes  Procedure Minutes: 0  Timed Activity Minutes Units   Ther Ex 41 3     Electronically signed by Arnaldo Kuhn PTA on 5/2/23 at 3:45 PM EDT

## 2023-05-04 ENCOUNTER — HOSPITAL ENCOUNTER (OUTPATIENT)
Dept: PHYSICAL THERAPY | Age: 38
Setting detail: THERAPIES SERIES
Discharge: HOME | End: 2023-05-04
Payer: COMMERCIAL

## 2023-05-04 PROCEDURE — 97110 THERAPEUTIC EXERCISES: CPT

## 2023-05-04 ASSESSMENT — PAIN SCALES - GENERAL: PAINLEVEL_OUTOF10: 6

## 2023-05-04 ASSESSMENT — PAIN DESCRIPTION - LOCATION: LOCATION: SHOULDER

## 2023-05-04 ASSESSMENT — PAIN DESCRIPTION - ORIENTATION: ORIENTATION: LEFT

## 2023-05-04 NOTE — PROGRESS NOTES
[address] [facility]  Outpatient Physical Therapy    Treatment Note        Date: 2023  Patient: Papo Jacobs  : 1985   Confirmed: Yes  MRN: [id number]  Referring Provider: Pippa Lucas MD   Secondary Referring Provider (If applicable): none   Medical Diagnosis: Adhesive capsulitis of unspecified shoulder [M75.00]  Other shoulder lesions, unspecified shoulder [M75.80] adhesive capsulitis of shoulder; rotator cuff tendinitis  Treatment Diagnosis: R shoulder A/PROM, decreased R UE strength, and increased R shoulder pain which is preventing pt from returning to work at [address]:  Insurance: Payor: Parul Armstrong / Plan: [address] [place] [address] / Product Type: *No Product type* /   PT Visit Information  Onset Date: 22  PT Insurance Information: BCBS  Total # of Visits Approved: 12 (12 visits left)  Total # of Visits to Date: 5  No Show: 0  Canceled Appointment: 0  Progress Note Counter:   Referring Provider (secondary): none    Subjective Information:  Subjective: /10 L shoulder; 2/10 R shoulder  HEP Compliance:  [x] Good [] Fair [] Poor [] Reports not doing due to:    Pain Screening  Patient Currently in Pain: Yes  Pain Level: 6  Pain Location: Shoulder  Pain Orientation: Left    Treatment:  Exercises:  Exercises  Exercise 1: UBE 2'/2' L1.0  Exercise 3: supine cane for L: flex/ABD, ER 10 b/l  Exercise 6: shoulder iso L only 3 sec X 10 all directions  Exercise 8: finger ladder 5\" x 4 Flexion to 29; 5 sec X 3 to 25 with R only- pt states decreased pain at end ROM- pt states 7/10 pain at end ROM  Exercise 9: PNF- R supine D1/D2 flex/ext X 10 each  Exercise 13: cane bench press X 20  Exercise 14: web slide YTB ER/IR in abd- max cues for technique and to maintain pain free ROM; row/lats RTB X 15  Exercise 15: chest pull palms up/down YTB X 10  Exercise 16: bicep curl 4lbs R/L X 10  Exercise 17: wall push up X 10  Exercise 18: posterior capsular stretch 15

## 2023-05-09 ENCOUNTER — HOSPITAL ENCOUNTER (OUTPATIENT)
Dept: PHYSICAL THERAPY | Age: 38
Setting detail: THERAPIES SERIES
Discharge: HOME | End: 2023-05-09
Payer: COMMERCIAL

## 2023-05-09 PROBLEM — S43.439A LABRAL TEAR OF SHOULDER: Status: ACTIVE | Noted: 2023-05-09

## 2023-05-09 PROBLEM — M75.81 TENDINITIS OF RIGHT ROTATOR CUFF: Status: ACTIVE | Noted: 2023-05-09

## 2023-05-09 PROCEDURE — 97110 THERAPEUTIC EXERCISES: CPT

## 2023-05-09 ASSESSMENT — PAIN SCALES - GENERAL: PAINLEVEL_OUTOF10: 8

## 2023-05-09 ASSESSMENT — PAIN DESCRIPTION - PAIN TYPE: TYPE: CHRONIC PAIN

## 2023-05-09 ASSESSMENT — PAIN DESCRIPTION - LOCATION: LOCATION: SHOULDER

## 2023-05-09 ASSESSMENT — PAIN DESCRIPTION - ORIENTATION: ORIENTATION: LEFT

## 2023-05-10 ENCOUNTER — HOSPITAL ENCOUNTER (OUTPATIENT)
Dept: PHYSICAL THERAPY | Age: 38
Setting detail: THERAPIES SERIES
Discharge: HOME | End: 2023-05-10
Payer: COMMERCIAL

## 2023-05-10 PROCEDURE — 97110 THERAPEUTIC EXERCISES: CPT

## 2023-05-10 ASSESSMENT — PAIN DESCRIPTION - LOCATION: LOCATION: SHOULDER

## 2023-05-10 ASSESSMENT — PAIN DESCRIPTION - DESCRIPTORS: DESCRIPTORS: SHARP

## 2023-05-10 ASSESSMENT — PAIN SCALES - GENERAL: PAINLEVEL_OUTOF10: 7

## 2023-05-10 ASSESSMENT — PAIN DESCRIPTION - PAIN TYPE: TYPE: CHRONIC PAIN

## 2023-05-10 ASSESSMENT — PAIN DESCRIPTION - ORIENTATION: ORIENTATION: LEFT

## 2023-05-10 NOTE — PROGRESS NOTES
[address] [facility] [address] [facility]  Outpatient Physical Therapy    Treatment Note        Date: 5/10/2023  Patient: Caroline Deleon  : 1985   Confirmed: Yes  MRN: [id number]  Referring Provider: Carlin Holley MD   Secondary Referring Provider (If applicable): none   Medical Diagnosis: Adhesive capsulitis of unspecified shoulder [M75.00]  Other shoulder lesions, unspecified shoulder [M75.80]    Treatment Diagnosis: R shoulder A/PROM, decreased R UE strength, and increased R shoulder pain which is preventing pt from returning to work at [address]:  Insurance: Payor: [facility] / Plan: [address] [place] [address] / Product Type: *No Product type* /   PT Visit Information  Onset Date: 22  PT Insurance Information: BCBS  Total # of Visits Approved: 12  Total # of Visits to Date: 6  No Show: 0  Canceled Appointment: 0  Progress Note Counter:   Referring Provider (secondary): none    Subjective Information:  Subjective: 7/10  HEP Compliance:  [x] Good [] Fair [] Poor [] Reports not doing due to:    Pain Screening  Patient Currently in Pain: Yes (Lt 8/10, Rt 2/10)  Pain Assessment: 0-10  Pain Level: 8  Pain Type: Chronic pain  Pain Location: Shoulder  Pain Orientation: Left    Treatment:  Exercises:  Exercises  Exercise 1: UBE 2'/2' L1.0  Exercise 3: standing cane L: flex/ABD, ER x10  Exercise 6: shoulder iso L only 3 sec X 10 all directions  Exercise 8: finger ladder 5\" x 5 Flexion to 29\" painful arc w/ Lt, to 24  Exercise 14: web slide YTB ER/IR in abd- max cues for technique and to maintain pain free ROM; row/lats RTB X 15  Exercise 15: chest pull palms up/down YTB X 10  Exercise 16: bicep curl 4lbs R/L X 10  Exercise 17: wall push up X 10  Exercise 18: posterior capsular stretch 15 sec X 3 R only  Exercise 20: HEP: continue current     *Indicates exercise, modality, or manual techniques to be initiated when appropriate    Objective Measures:       Strength: [x] NT  [] MMT

## 2023-05-10 NOTE — PROGRESS NOTES
[address] [facility]  Outpatient Physical Therapy    Treatment Note        Date: 5/10/2023  Patient: Oliver Pratt  : 1985   Confirmed: Yes  MRN: [id number]  Referring Provider: Davidson Unger MD   Secondary Referring Provider (If applicable): none   Medical Diagnosis: Adhesive capsulitis of unspecified shoulder [M75.00]  Other shoulder lesions, unspecified shoulder [M75.80]    Treatment Diagnosis: R shoulder A/PROM, decreased R UE strength, and increased R shoulder pain which is preventing pt from returning to work at [address]:  Insurance: Payor: Angelica Alberto / Plan: [address] [place] [address] / Product Type: *No Product type* /   PT Visit Information  Onset Date: 22  PT Insurance Information: BCBS  Total # of Visits Approved: 12  Total # of Visits to Date: 7  No Show: 0  Canceled Appointment: 0  Progress Note Counter:   Referring Provider (secondary): none    Subjective Information:  Subjective: Pt reports the left shoulder is hurting more than his right.  7/10 in L shoulder and 2/10 R shoulder  HEP Compliance:  [x] Good [] Fair [] Poor [] Reports not doing due to:    Pain Screening  Patient Currently in Pain: Yes (7/10 L 2/10 R)  Pain Assessment: 0-10  Pain Level: 7  Pain Type: Chronic pain  Pain Location: Shoulder  Pain Orientation: Left  Pain Descriptors: Sharp    Treatment:  Exercises:  Exercises  Exercise 1: UBE 3'/3' L1.0  Exercise 3: standing cane L: flex/ABD, ER x10  Exercise 6: shoulder iso L only 3 sec X 10 all directions  Exercise 7: UT/LS stretch 3x30\" ea (gravity only w/ hand gripped under chair)  Exercise 8: finger ladder 5\" x 5 Flexion to 30\" painful arc w/ Lt, to 24  Exercise 9: PNF- R supine D1/D2 flex/ext X 10 each  Exercise 14: web slide RTB ER/IR in abd- max cues for technique and to maintain pain free ROM; row/lats RTB X 15  Exercise 15: chest pull palms up/down RTB X 10  Exercise 16: bicep curl 4lbs R/L X 15  Exercise 17: wall push up

## 2023-05-15 ENCOUNTER — HOSPITAL ENCOUNTER (OUTPATIENT)
Dept: PHYSICAL THERAPY | Age: 38
Setting detail: THERAPIES SERIES
Discharge: HOME | End: 2023-05-15
Payer: COMMERCIAL

## 2023-05-15 NOTE — PROGRESS NOTES
[facility]    [] [address]  [] [place] [address] and Therapy            Physical Therapy  Cancellation/No-show Note  Patient Name:  Zoe Liang  :  1985   Date:  5/15/2023     Diagnosis: adhesive capsulitis of shoulder; rotator cuff tendinitis    Visit Information:  PT Visit Information  Onset Date: 22  PT Insurance Information: BCBS  Total # of Visits Approved: 12  Total # of Visits to Date: 7  No Show: 1  Canceled Appointment: 0  Progress Note Counter:  (NS 5/15)  Referring Provider (secondary): none    For today's appointment patient:  []  Cancelled  []  Rescheduled appointment  [x]  No-show   []  Called pt to remind of next appointment     Reason given by patient:  []  Patient ill  []  Conflicting appointment  []  No transportation    []  Conflict with work  []  No reason given  []  Inclement weather   []  Other:       Comments:   Pt called at 1:45 to reschedule. Rescheduled at 1:45 on .      Signature: Electronically signed by Alexander Mendez PT on 5/15/23 at 1:48 PM EDT

## 2023-05-16 ENCOUNTER — HOSPITAL ENCOUNTER (OUTPATIENT)
Dept: PHYSICAL THERAPY | Age: 38
Setting detail: THERAPIES SERIES
Discharge: HOME | End: 2023-05-16
Payer: COMMERCIAL

## 2023-05-16 PROCEDURE — 97110 THERAPEUTIC EXERCISES: CPT

## 2023-05-16 ASSESSMENT — PAIN DESCRIPTION - ORIENTATION: ORIENTATION: LEFT

## 2023-05-16 ASSESSMENT — PAIN DESCRIPTION - DESCRIPTORS: DESCRIPTORS: ACHING

## 2023-05-16 ASSESSMENT — PAIN DESCRIPTION - LOCATION: LOCATION: SHOULDER

## 2023-05-16 ASSESSMENT — PAIN SCALES - GENERAL: PAINLEVEL_OUTOF10: 7

## 2023-05-16 NOTE — PROGRESS NOTES
[address] [facility]  Outpatient Physical Therapy    Treatment Note        Date: 2023  Patient: Lorena Herman  : 1985   Confirmed: Yes  MRN: [id number]  Referring Provider: Maru Haney MD   Secondary Referring Provider (If applicable): none   Medical Diagnosis: Adhesive capsulitis of unspecified shoulder [M75.00]  Other shoulder lesions, unspecified shoulder [M75.80]    Treatment Diagnosis: R shoulder A/PROM, decreased R UE strength, and increased R shoulder pain which is preventing pt from returning to work at [address]:  Insurance: Payor: Herbie Treviño / Plan: [address] [place] [address] / Product Type: *No Product type* /   PT Visit Information  Onset Date: 22  PT Insurance Information: BCBS  Total # of Visits Approved: 12  Total # of Visits to Date: 8  No Show: 1  Canceled Appointment: 0  Progress Note Counter:   Referring Provider (secondary): none    Subjective Information:  Subjective: \"My right shoulder is feeling better. They left side feels the same. \" Continues to reports difficulty reaching behind his back with his right hand. HEP Compliance:  [x] Good [] Fair [] Poor [] Reports not doing due to:    Pain Screening  Patient Currently in Pain: Yes  Pain Assessment: 0-10  Pain Level: 7  Pain Location: Shoulder  Pain Orientation: Left  Pain Descriptors: Aching    Treatment:  Exercises:  Exercises  Exercise 1: UBE 3'/3' L1.0  Exercise 6: PROM right shld IR 3 minutes  Exercise 7: UT/LS stretch 3x30\" ea (gravity only w/ hand gripped under chair)  Exercise 8: finger ladder 5\" x 5 Flexion to 30\" painful arc w/ Lt, to 24  Exercise 14: web slide RTB ER/IR in abd- max cues for technique and to maintain pain free ROM; row/lats GTB X 15  Exercise 15: chest pull palms up/down RTB X 10  Exercise 16: bicep curl RTB Right x20  Exercise 17: wall push up X 15  Exercise 18: posterior capsular [id number]  sec X 3 R only   Assessment:    Body Structures, Functions,

## 2023-05-17 ENCOUNTER — HOSPITAL ENCOUNTER (OUTPATIENT)
Dept: PHYSICAL THERAPY | Age: 38
Setting detail: THERAPIES SERIES
Discharge: HOME | End: 2023-05-17
Payer: COMMERCIAL

## 2023-05-17 PROCEDURE — 97140 MANUAL THERAPY 1/> REGIONS: CPT

## 2023-05-17 PROCEDURE — 97110 THERAPEUTIC EXERCISES: CPT

## 2023-05-17 ASSESSMENT — PAIN DESCRIPTION - ORIENTATION: ORIENTATION: LEFT

## 2023-05-17 ASSESSMENT — PAIN SCALES - GENERAL: PAINLEVEL_OUTOF10: 2

## 2023-05-17 ASSESSMENT — PAIN DESCRIPTION - LOCATION: LOCATION: SHOULDER

## 2023-05-17 ASSESSMENT — PAIN DESCRIPTION - DESCRIPTORS: DESCRIPTORS: ACHING

## 2023-05-23 ENCOUNTER — APPOINTMENT (OUTPATIENT)
Dept: PHYSICAL THERAPY | Age: 38
End: 2023-05-23
Payer: COMMERCIAL

## 2023-05-23 PROCEDURE — 97035 APP MDLTY 1+ULTRASOUND EA 15: CPT

## 2023-05-23 PROCEDURE — 97110 THERAPEUTIC EXERCISES: CPT

## 2023-05-23 ASSESSMENT — PAIN SCALES - GENERAL: PAINLEVEL_OUTOF10: 2

## 2023-05-23 ASSESSMENT — PAIN DESCRIPTION - ORIENTATION: ORIENTATION: LEFT;RIGHT

## 2023-05-23 ASSESSMENT — PAIN DESCRIPTION - LOCATION: LOCATION: SHOULDER

## 2023-05-23 ASSESSMENT — PAIN DESCRIPTION - DESCRIPTORS: DESCRIPTORS: ACHING;SORE

## 2023-05-23 NOTE — PROGRESS NOTES
[address] [facility]  Outpatient Physical Therapy    Treatment Note        Date: 2023  Patient: Ayde Sanches  : 1985   Confirmed: Yes  MRN: [id number]  Referring Provider: William Mendez MD   Secondary Referring Provider (If applicable): none   Medical Diagnosis: Adhesive capsulitis of unspecified shoulder [M75.00]  Other shoulder lesions, unspecified shoulder [M75.80] adhesive capsulitis of shoulder; rotator cuff tendinitis  Treatment Diagnosis: R shoulder A/PROM, decreased R UE strength, and increased R shoulder pain which is preventing pt from returning to work at [address]:  Insurance: Payor: Quinn Pina / Plan: [address] [place] [address] / Product Type: *No Product type* /   PT Visit Information  Onset Date: 22  PT Insurance Information: BCBS  Total # of Visits Approved: 12  Total # of Visits to Date: 10  No Show: 1  Canceled Appointment: 0  Progress Note Counter: 10/12  Referring Provider (secondary): none    Subjective Information:  Subjective: Pt states \"I'll be honest, I had about 3 days of relief after last visit. \"  HEP Compliance:  [x] Good [] Fair [] Poor [] Reports not doing due to:    Pain Screening  Patient Currently in Pain: Yes  Pain Assessment: 0-10  Pain Level: 2 (Lt 6/10, Rt 2-3/10)  Pain Location: Shoulder  Pain Orientation: Left, Right  Pain Descriptors: Aching, Sore    Treatment:  Exercises:  Exercises  Exercise 1: UBE 3'/3' L1.5  Exercise 2: IR stretch 3x30\" w/ belt b/l  Exercise 3: Supine serratus punch 2x10 w/ 3# wt, alphabet x1 set b/l  Exercise 4: S/L shldr ABD*  Exercise 5: TB: Rt shldr flex/ABD w/ YTB x15 ea  Exercise 6: PROM right shld IR 3 minutes  Exercise 7: UT/LS stretch 3x30\" ea (gravity only w/ hand gripped under chair)  Exercise 8: Overhead ball toss at rebounder x10 w/ 2# ball Rt  Exercise 9: PNF- R supine D1/D2 flex/ext X 15 each RTB , IR/ER at 90 deg ABD (Rt only)  x15 ea YTB  Exercise 14: web slide: PNF D1 and D2

## 2023-05-25 ENCOUNTER — APPOINTMENT (OUTPATIENT)
Dept: PHYSICAL THERAPY | Age: 38
End: 2023-05-25
Payer: COMMERCIAL

## 2023-05-25 PROCEDURE — 97110 THERAPEUTIC EXERCISES: CPT

## 2023-05-25 PROCEDURE — 97035 APP MDLTY 1+ULTRASOUND EA 15: CPT

## 2023-05-25 PROCEDURE — 97140 MANUAL THERAPY 1/> REGIONS: CPT

## 2023-05-25 ASSESSMENT — PAIN DESCRIPTION - LOCATION: LOCATION: SHOULDER

## 2023-05-25 ASSESSMENT — PAIN DESCRIPTION - DESCRIPTORS: DESCRIPTORS: SORE;ACHING

## 2023-05-25 ASSESSMENT — PAIN SCALES - GENERAL: PAINLEVEL_OUTOF10: 5

## 2023-05-25 ASSESSMENT — PAIN DESCRIPTION - ORIENTATION: ORIENTATION: LEFT

## 2023-05-25 NOTE — PROGRESS NOTES
3 wks Goal Status   STG 1 Pt will demonstrate improved B shoulder A/PROM >/= 5-10 deg for return to normalized motion. In progress   STG 2 Pt will demosntrate improved B UE/shoulder strength >/= 4-/5 for return to lifting and return to work without restrictions. In progress     Long Term Goals Completed by 6 wks Goal Status   LTG 1 Pt will demonstrate indep and 100% compliance with HEP for self management of symptoms and return to work. In progress   LTG 2 Pt will demonstrate improved score on UEFS >/= 65/80 for improved quality of life. In progress   LTG 3 Pt will demonstrate decreased R shoulder pain </= 2/10 during use of B UE during reaching overhead and sleeping for return to work and return to fishing. In progress     Plan:  Frequency/Duration:  Plan  Plan Frequency: 2xs/wk  Plan weeks: 6  Specific Instructions for Next Treatment: Cont US  Current Treatment Recommendations: Strengthening, ROM, Functional mobility training, Endurance training, Neuromuscular re-education, Manual, Pain management, Return to work related activity, Home exercise program, Safety education & training, Patient/Caregiver education & training, Equipment evaluation, education, & procurement, Modalities, Positioning, Therapeutic activities  Additional Comments: PT POC supervised by Seaview Sheffield, PT  Pt to continue current HEP. See objective section for any therapeutic exercise changes, additions or modifications this date.     Therapy Time:      PT Individual Minutes  Time In: 1426  Time Out: 9722  Minutes: 48  Timed Code Treatment Minutes: 48 Minutes  Procedure Minutes: N/A   Timed Activity Minutes Units   Ther Ex 22 1   US 8 1   Manual  18 1     Electronically signed by Zoie Pedersen PTA on 5/25/23 at [address] [facility] EDT

## 2023-05-30 ENCOUNTER — APPOINTMENT (OUTPATIENT)
Dept: PHYSICAL THERAPY | Age: 38
End: 2023-05-30
Payer: COMMERCIAL

## 2023-05-30 PROCEDURE — 97035 APP MDLTY 1+ULTRASOUND EA 15: CPT

## 2023-05-30 PROCEDURE — 97110 THERAPEUTIC EXERCISES: CPT

## 2023-05-30 PROCEDURE — 97140 MANUAL THERAPY 1/> REGIONS: CPT

## 2023-05-30 ASSESSMENT — PAIN DESCRIPTION - PAIN TYPE: TYPE: CHRONIC PAIN

## 2023-05-30 ASSESSMENT — PAIN DESCRIPTION - LOCATION: LOCATION: SHOULDER

## 2023-05-30 ASSESSMENT — PAIN SCALES - GENERAL: PAINLEVEL_OUTOF10: 5

## 2023-05-30 NOTE — PROGRESS NOTES
[address]     Ph: [phone]  Fax: [phone]      [] Certification  [] Recertification []  Plan of Care  [] Progress Note [x] Discharge      Referring Provider: Casey Fisher MD Referring Provider (secondary): none   From: Jaylin Lau , PT  Patient: Jennifer Mahoney (39 y.o. male) : 1985 Date: 2023  Medical Diagnosis: Adhesive capsulitis of unspecified shoulder [M75.00]  Other shoulder lesions, unspecified shoulder [M75.80] adhesive capsulitis of shoulder; rotator cuff tendinitis Diagnosis: adhesive capsulitis of shoulder; rotator cuff tendinitis   Treatment Diagnosis: R shoulder A/PROM, decreased R UE strength, and increased R shoulder pain which is preventing pt from returning to work at [employer] of Care/Certification Expiration Date: :     Progress Report Period from:  2023  to 2023    Visits to Date: 12 No Show: 1 Cancelled Appts: 0    OBJECTIVE:   Short Term Goals - Time Frame for Short Term Goals: 3 wks    Goals Current/Discharge status  Status   Short Term Goal 1: Pt will demonstrate improved B shoulder A/PROM >/= 5-10 deg for return to normalized motion. PROM RUE (degrees)  R Shoulder Flex  (0-180): WNL's  R Shoulder ABduction (0-180): WNL's  R Shoulder Int Rotation  (0-70): 65 deg  R Shoulder Ext Rotation  (0-90): WNL's  PROM LUE (degrees)  LUE General PROM: Flexion 172 deg,  deg, IR/ER WNL's at 45 deg ABD  AROM LUE (degrees)  L Shoulder Flexion (0-180): 163 deg (supine), 142 deg (standing)  L Shoulder Extension (0-45): 48 deg  L Shoulder ABduction (0-180): 104 deg (supine), 107 deg (standing)  L Shoulder Int Rotation  (0-70): WNL  L Shoulder Ext Rotation  (0-90): 58 deg (at 45 deg ABD), T4 Partially met   Short Term Goal 2: Pt will demosntrate improved B UE/shoulder strength >/= 4-/5 for return to lifting and return to work without restrictions.

## 2023-05-30 NOTE — PROGRESS NOTES
[address] [facility] [place] [facility]  Outpatient Physical Therapy    Treatment Note         Date: 2023  Patient: Perla Carbajal  : 1985   Confirmed: Yes  MRN: [id number]  Referring Provider: Isrrael Flowers MD   Secondary Referring Provider (If applicable): none   Medical Diagnosis: Adhesive capsulitis of unspecified shoulder [M75.00]  Other shoulder lesions, unspecified shoulder [M75.80] adhesive capsulitis of shoulder; rotator cuff tendinitis  Treatment Diagnosis: R shoulder A/PROM, decreased R UE strength, and increased R shoulder pain which is preventing pt from returning to work at [address]:  Insurance: Payor: Elder Gage / Plan: [address] [place] [address] / Product Type: *No Product type* /   PT Visit Information  Onset Date: 22  PT Insurance Information: BCBS  Total # of Visits Approved: 12  Total # of Visits to Date: 12  No Show: 1  Canceled Appointment: 0  Progress Note Counter:   Referring Provider (secondary): none    Subjective Information:  Subjective: Nothing new to report since LV.   HEP Compliance:  [x] Good [] Fair [] Poor [] Reports not doing due to:    Pain Screening  Patient Currently in Pain: Yes  Pain Assessment: 0-10  Pain Level: 5 (Lt 5/10 at rest, 7/10 w/ activity; Rt 0/10)  Best Pain Level: 1  Worst Pain Level: 7 (Lt shldr)  Pain Type: Chronic pain  Pain Location: Shoulder    Treatment:  Exercises:  Exercises  Exercise 1: UBE 3'/3' Rt UE only  Exercise 2: Sleeper stretch 3x30\" Rt shldr  Exercise 3: ROM/MMT measures  Exercise 6: PROM 10 min Lt shldr  Exercise 7: UT/LS stretch 3x30\" ea (gravity only w/ hand gripped under chair)  Exercise 14: web slide: PNF D1 and D2 flexion/ext x10 ea YTB     Manual:   Manual Therapy  Soft Tissue Mobilizaton: ant/post shldr; Lt  Other: PROM Lt shoulder- alll planes 15 min; KT to [employer]- pt declined; goal assessment (MMT/ROM)     Modalities:  Ultrasound (CPT [id number])  Patient Position: Supine  Ultrasound location:

## 2023-07-12 DIAGNOSIS — I10 ESSENTIAL HYPERTENSION: ICD-10-CM

## 2023-07-12 NOTE — TELEPHONE ENCOUNTER
Comments:     Last Office Visit (last PCP visit):   1/23/2023    Next Visit Date:  Future Appointments   Date Time Provider [address]   7/28/2023  9:30 AM Lucien Nair MD [facility] AT [place]       **If hasn't been seen in over a year OR hasn't followed up according to last diabetes/ADHD visit, make appointment for patient before sending refill to provider.     Rx requested:  Requested Prescriptions     Pending Prescriptions Disp Refills    metoprolol (LOPRESSOR) 100 MG tablet [Pharmacy Med Name: METOPROLOL TARTRATE 100 MG TAB] 180 tablet 1     Sig: TAKE 1 TABLET BY MOUTH TWICE A DAY

## 2023-07-13 RX ORDER — METOPROLOL TARTRATE 100 MG/1
TABLET ORAL
Qty: 180 TABLET | Refills: 1 | Status: SHIPPED | OUTPATIENT
Start: 2023-07-13

## 2023-08-10 ENCOUNTER — HOSPITAL ENCOUNTER (OUTPATIENT)
Dept: PREADMISSION TESTING | Age: 38
Discharge: HOME | End: 2023-08-14
Payer: COMMERCIAL

## 2023-08-10 VITALS
TEMPERATURE: 98.2 F | WEIGHT: 231.8 LBS | DIASTOLIC BLOOD PRESSURE: 93 MMHG | BODY MASS INDEX: 35.13 KG/M2 | OXYGEN SATURATION: 99 % | SYSTOLIC BLOOD PRESSURE: 142 MMHG | RESPIRATION RATE: 16 BRPM | HEART RATE: 70 BPM | HEIGHT: 68 IN

## 2023-08-10 LAB
ALBUMIN SERPL-MCNC: 4.7 G/DL (ref 3.5–4.6)
ALP SERPL-CCNC: 53 U/L (ref 35–104)
ALT SERPL-CCNC: 50 U/L (ref 0–41)
ANION GAP SERPL CALCULATED.3IONS-SCNC: 11 MEQ/L (ref 9–15)
APTT PPP: 28.6 SEC (ref 24.4–36.8)
AST SERPL-CCNC: 18 U/L (ref 0–40)
BASOPHILS # BLD: 0.1 K/UL (ref 0–0.2)
BASOPHILS NFR BLD: 0.8 %
BILIRUB SERPL-MCNC: 0.5 MG/DL (ref 0.2–0.7)
BILIRUB UR QL STRIP: NEGATIVE
BUN SERPL-MCNC: 17 MG/DL (ref 6–20)
CALCIUM SERPL-MCNC: 9.6 MG/DL (ref 8.5–9.9)
CHLORIDE SERPL-SCNC: 102 MEQ/L (ref 95–107)
CLARITY UR: CLEAR
CO2 SERPL-SCNC: 26 MEQ/L (ref 20–31)
COLOR UR: YELLOW
CREAT SERPL-MCNC: 0.85 MG/DL (ref 0.7–1.2)
EOSINOPHIL # BLD: 0.2 K/UL (ref 0–0.7)
EOSINOPHIL NFR BLD: 2.2 %
ERYTHROCYTE [DISTWIDTH] IN BLOOD BY AUTOMATED COUNT: 13.7 % (ref 11.5–14.5)
GLOBULIN SER CALC-MCNC: 2.3 G/DL (ref 2.3–3.5)
GLUCOSE SERPL-MCNC: 104 MG/DL (ref 70–99)
GLUCOSE UR STRIP-MCNC: NEGATIVE MG/DL
HCT VFR BLD AUTO: 49.9 % (ref 42–52)
HGB BLD-MCNC: 16.8 G/DL (ref 14–18)
HGB UR QL STRIP: NEGATIVE
INR PPP: 1.1
KETONES UR STRIP-MCNC: NEGATIVE MG/DL
LEUKOCYTE ESTERASE UR QL STRIP: NEGATIVE
LYMPHOCYTES # BLD: 2.6 K/UL (ref 1–4.8)
LYMPHOCYTES NFR BLD: 23.3 %
MCH RBC QN AUTO: 30.3 PG (ref 27–31.3)
MCHC RBC AUTO-ENTMCNC: 33.7 % (ref 33–37)
MCV RBC AUTO: 89.8 FL (ref 79–92.2)
MONOCYTES # BLD: 0.9 K/UL (ref 0.2–0.8)
MONOCYTES NFR BLD: 8.3 %
NEUTROPHILS # BLD: 7.3 K/UL (ref 1.4–6.5)
NEUTS SEG NFR BLD: 65.4 %
NITRITE UR QL STRIP: NEGATIVE
PH UR STRIP: 5.5 [PH] (ref 5–9)
PLATELET # BLD AUTO: 311 K/UL (ref 130–400)
POTASSIUM SERPL-SCNC: 4.1 MEQ/L (ref 3.4–4.9)
PROT SERPL-MCNC: 7 G/DL (ref 6.3–8)
PROT UR STRIP-MCNC: NEGATIVE MG/DL
PROTHROMBIN TIME: 13.9 SEC (ref 12.3–14.9)
RBC # BLD AUTO: 5.55 M/UL (ref 4.7–6.1)
SODIUM SERPL-SCNC: 139 MEQ/L (ref 135–144)
SP GR UR STRIP: 1.02 (ref 1–1.03)
UROBILINOGEN UR STRIP-ACNC: 0.2 E.U./DL
WBC # BLD AUTO: 11.2 K/UL (ref 4.8–10.8)

## 2023-08-10 PROCEDURE — 80053 COMPREHEN METABOLIC PANEL: CPT

## 2023-08-10 PROCEDURE — 85730 THROMBOPLASTIN TIME PARTIAL: CPT

## 2023-08-10 PROCEDURE — 85610 PROTHROMBIN TIME: CPT

## 2023-08-10 PROCEDURE — 81003 URINALYSIS AUTO W/O SCOPE: CPT

## 2023-08-10 PROCEDURE — 85025 COMPLETE CBC W/AUTO DIFF WBC: CPT

## 2023-08-12 DIAGNOSIS — J20.9 ACUTE BRONCHITIS, UNSPECIFIED ORGANISM: ICD-10-CM

## 2023-08-13 RX ORDER — METHYLPREDNISOLONE 4 MG/1
TABLET ORAL
OUTPATIENT
Start: 2023-08-13

## 2023-08-14 RX ORDER — ALBUTEROL SULFATE 2.5 MG/3ML
SOLUTION RESPIRATORY (INHALATION)
Qty: 150 ML | Refills: 3 | Status: SHIPPED | OUTPATIENT
Start: 2023-08-14

## 2023-08-16 DIAGNOSIS — J45.40 MODERATE PERSISTENT ASTHMA WITHOUT COMPLICATION: ICD-10-CM

## 2023-08-16 DIAGNOSIS — R05.9 COUGH: ICD-10-CM

## 2023-08-16 RX ORDER — FLUTICASONE PROPIONATE AND SALMETEROL 50; 100 UG/1; UG/1
POWDER RESPIRATORY (INHALATION)
Qty: 60 EACH | Refills: 1 | Status: SHIPPED | OUTPATIENT
Start: 2023-08-16

## 2023-08-16 NOTE — H&P
[address] [facility]                              HISTORY AND PHYSICAL    PATIENT NAME: Tatum Zepeda                  :        1985  MED REC NO:   [id number]                            ROOM:  ACCOUNT NO:   [de-identified]                           ADMIT DATE: 2023  PROVIDER:     Rita Rodriguez PA-C    DATE OF SERVICE:  2023    Dictating for Glen De Santiago M.D. FAMILY PROVIDER:  Efrain Ayon M.D.    CHIEF COMPLAINT:  Right shoulder pain and stiffness. HISTORY OF PRESENT ILLNESS:  The patient has had right shoulder pain for  approximately one year. He underwent right shoulder arthroscopy in  2020, extensive debridement. There was a significant delay in his  physical therapy due to COVID and scheduling issues and he subsequently  developed adhesions. Conservative therapy and physical therapy have  failed to provide relief and so, after risks, benefits, and alternatives  were discussed, the patient elected to proceed with right shoulder  arthroscopy, lysis of adhesions, and manipulation under anesthesia. This will be performed on 2023 at [facility] in [place]. PAST MEDICAL HISTORY:  Significant for seasonal allergies, asthma,  depression, hypertension, low testosterone levels, acid reflux, and  erectile dysfunction and obesity. PAST SURGICAL HISTORY:  Significant for right shoulder arthroscopy. ANESTHESIA NOTE:  The patient states he had significant breathing issues  after the nerve block. He does not want a scalene nerve block for this  procedure. SOCIAL HISTORY:  He denies any substance abuse. Reports one to two  alcoholic beverages per month. Denies any smoking history. FAMILY HISTORY:  Noncontributory. REVIEW OF SYSTEMS:  Noncontributory. PHYSICAL EXAMINATION:  GENERAL:  This is a 69-year-old  male. Height 5 feet 9 inches  and weight 230.   HEENT:

## 2023-08-18 ENCOUNTER — ANESTHESIA EVENT (OUTPATIENT)
Dept: OPERATING ROOM | Age: 38
End: 2023-08-18
Payer: COMMERCIAL

## 2023-08-18 ENCOUNTER — HOSPITAL ENCOUNTER (OUTPATIENT)
Age: 38
Setting detail: OUTPATIENT SURGERY
Discharge: HOME | End: 2023-08-18
Attending: ORTHOPAEDIC SURGERY | Admitting: ORTHOPAEDIC SURGERY
Payer: COMMERCIAL

## 2023-08-18 ENCOUNTER — ANESTHESIA (OUTPATIENT)
Dept: OPERATING ROOM | Age: 38
End: 2023-08-18
Payer: COMMERCIAL

## 2023-08-18 VITALS
WEIGHT: 230 LBS | RESPIRATION RATE: 16 BRPM | HEIGHT: 69 IN | DIASTOLIC BLOOD PRESSURE: 64 MMHG | HEART RATE: 77 BPM | OXYGEN SATURATION: 98 % | BODY MASS INDEX: 34.07 KG/M2 | SYSTOLIC BLOOD PRESSURE: 160 MMHG | TEMPERATURE: 97 F

## 2023-08-18 PROCEDURE — 6360000002 HC RX W HCPCS: Performed by: ANESTHESIOLOGIST ASSISTANT

## 2023-08-18 PROCEDURE — 3700000000 HC ANESTHESIA ATTENDED CARE: Performed by: ORTHOPAEDIC SURGERY

## 2023-08-18 PROCEDURE — 3600000014 HC SURGERY LEVEL 4 ADDTL 15MIN: Performed by: ORTHOPAEDIC SURGERY

## 2023-08-18 PROCEDURE — 2580000003 HC RX 258: Performed by: ANESTHESIOLOGIST ASSISTANT

## 2023-08-18 PROCEDURE — 2580000003 HC RX 258: Performed by: ANESTHESIOLOGY

## 2023-08-18 PROCEDURE — C9399 UNCLASSIFIED DRUGS OR BIOLOG: HCPCS | Performed by: ANESTHESIOLOGIST ASSISTANT

## 2023-08-18 PROCEDURE — 7100000011 HC PHASE II RECOVERY - ADDTL 15 MIN: Performed by: ORTHOPAEDIC SURGERY

## 2023-08-18 PROCEDURE — 3600000004 HC SURGERY LEVEL 4 BASE: Performed by: ORTHOPAEDIC SURGERY

## 2023-08-18 PROCEDURE — 2580000003 HC RX 258: Performed by: ORTHOPAEDIC SURGERY

## 2023-08-18 PROCEDURE — 7100000010 HC PHASE II RECOVERY - FIRST 15 MIN: Performed by: ORTHOPAEDIC SURGERY

## 2023-08-18 PROCEDURE — 6360000002 HC RX W HCPCS: Performed by: ORTHOPAEDIC SURGERY

## 2023-08-18 PROCEDURE — 6360000002 HC RX W HCPCS: Performed by: ANESTHESIOLOGY

## 2023-08-18 PROCEDURE — 2500000003 HC RX 250 WO HCPCS: Performed by: ANESTHESIOLOGIST ASSISTANT

## 2023-08-18 PROCEDURE — 3700000001 HC ADD 15 MINUTES (ANESTHESIA): Performed by: ORTHOPAEDIC SURGERY

## 2023-08-18 PROCEDURE — 2709999900 HC NON-CHARGEABLE SUPPLY: Performed by: ORTHOPAEDIC SURGERY

## 2023-08-18 PROCEDURE — 6370000000 HC RX 637 (ALT 250 FOR IP): Performed by: ANESTHESIOLOGY

## 2023-08-18 RX ORDER — MAGNESIUM HYDROXIDE 1200 MG/15ML
LIQUID ORAL CONTINUOUS PRN
Status: COMPLETED | OUTPATIENT
Start: 2023-08-18 | End: 2023-08-18

## 2023-08-18 RX ORDER — SODIUM CHLORIDE, SODIUM LACTATE, POTASSIUM CHLORIDE, CALCIUM CHLORIDE 600; 310; 30; 20 MG/100ML; MG/100ML; MG/100ML; MG/100ML
INJECTION, SOLUTION INTRAVENOUS CONTINUOUS
Status: DISCONTINUED | OUTPATIENT
Start: 2023-08-18 | End: 2023-08-18 | Stop reason: HOSPADM

## 2023-08-18 RX ORDER — ROCURONIUM BROMIDE 10 MG/ML
INJECTION, SOLUTION INTRAVENOUS PRN
Status: DISCONTINUED | OUTPATIENT
Start: 2023-08-18 | End: 2023-08-18 | Stop reason: SDUPTHER

## 2023-08-18 RX ORDER — IPRATROPIUM BROMIDE AND ALBUTEROL SULFATE 2.5; .5 MG/3ML; MG/3ML
1 SOLUTION RESPIRATORY (INHALATION)
Status: DISCONTINUED | OUTPATIENT
Start: 2023-08-18 | End: 2023-08-18 | Stop reason: HOSPADM

## 2023-08-18 RX ORDER — SODIUM CHLORIDE 0.9 % (FLUSH) 0.9 %
5-40 SYRINGE (ML) INJECTION PRN
Status: DISCONTINUED | OUTPATIENT
Start: 2023-08-18 | End: 2023-08-18 | Stop reason: HOSPADM

## 2023-08-18 RX ORDER — OXYCODONE HYDROCHLORIDE 5 MG/1
10 TABLET ORAL PRN
Status: COMPLETED | OUTPATIENT
Start: 2023-08-18 | End: 2023-08-18

## 2023-08-18 RX ORDER — MIDAZOLAM HYDROCHLORIDE 1 MG/ML
INJECTION INTRAMUSCULAR; INTRAVENOUS PRN
Status: DISCONTINUED | OUTPATIENT
Start: 2023-08-18 | End: 2023-08-18 | Stop reason: SDUPTHER

## 2023-08-18 RX ORDER — GLUCAGON 1 MG/ML
1 KIT INJECTION PRN
Status: DISCONTINUED | OUTPATIENT
Start: 2023-08-18 | End: 2023-08-18 | Stop reason: HOSPADM

## 2023-08-18 RX ORDER — SODIUM CHLORIDE, SODIUM LACTATE, POTASSIUM CHLORIDE, CALCIUM CHLORIDE 600; 310; 30; 20 MG/100ML; MG/100ML; MG/100ML; MG/100ML
INJECTION, SOLUTION INTRAVENOUS CONTINUOUS PRN
Status: DISCONTINUED | OUTPATIENT
Start: 2023-08-18 | End: 2023-08-18 | Stop reason: SDUPTHER

## 2023-08-18 RX ORDER — FENTANYL CITRATE 0.05 MG/ML
25 INJECTION, SOLUTION INTRAMUSCULAR; INTRAVENOUS EVERY 5 MIN PRN
Status: DISCONTINUED | OUTPATIENT
Start: 2023-08-18 | End: 2023-08-18 | Stop reason: HOSPADM

## 2023-08-18 RX ORDER — FENTANYL CITRATE 50 UG/ML
INJECTION, SOLUTION INTRAMUSCULAR; INTRAVENOUS PRN
Status: DISCONTINUED | OUTPATIENT
Start: 2023-08-18 | End: 2023-08-18 | Stop reason: SDUPTHER

## 2023-08-18 RX ORDER — SODIUM CHLORIDE 0.9 % (FLUSH) 0.9 %
5-40 SYRINGE (ML) INJECTION EVERY 12 HOURS SCHEDULED
Status: DISCONTINUED | OUTPATIENT
Start: 2023-08-18 | End: 2023-08-18 | Stop reason: HOSPADM

## 2023-08-18 RX ORDER — METOCLOPRAMIDE HYDROCHLORIDE 5 MG/ML
10 INJECTION INTRAMUSCULAR; INTRAVENOUS
Status: DISCONTINUED | OUTPATIENT
Start: 2023-08-18 | End: 2023-08-18 | Stop reason: HOSPADM

## 2023-08-18 RX ORDER — LABETALOL HYDROCHLORIDE 5 MG/ML
10 INJECTION, SOLUTION INTRAVENOUS
Status: DISCONTINUED | OUTPATIENT
Start: 2023-08-18 | End: 2023-08-18 | Stop reason: HOSPADM

## 2023-08-18 RX ORDER — PROPOFOL 10 MG/ML
INJECTION, EMULSION INTRAVENOUS PRN
Status: DISCONTINUED | OUTPATIENT
Start: 2023-08-18 | End: 2023-08-18 | Stop reason: SDUPTHER

## 2023-08-18 RX ORDER — SODIUM CHLORIDE 9 MG/ML
INJECTION, SOLUTION INTRAVENOUS PRN
Status: DISCONTINUED | OUTPATIENT
Start: 2023-08-18 | End: 2023-08-18 | Stop reason: HOSPADM

## 2023-08-18 RX ORDER — ONDANSETRON 2 MG/ML
4 INJECTION INTRAMUSCULAR; INTRAVENOUS
Status: COMPLETED | OUTPATIENT
Start: 2023-08-18 | End: 2023-08-18

## 2023-08-18 RX ORDER — BUPIVACAINE HYDROCHLORIDE 5 MG/ML
INJECTION, SOLUTION EPIDURAL; INTRACAUDAL PRN
Status: DISCONTINUED | OUTPATIENT
Start: 2023-08-18 | End: 2023-08-18 | Stop reason: ALTCHOICE

## 2023-08-18 RX ORDER — MEPERIDINE HYDROCHLORIDE 25 MG/ML
12.5 INJECTION INTRAMUSCULAR; INTRAVENOUS; SUBCUTANEOUS EVERY 5 MIN PRN
Status: DISCONTINUED | OUTPATIENT
Start: 2023-08-18 | End: 2023-08-18 | Stop reason: HOSPADM

## 2023-08-18 RX ORDER — ONDANSETRON 2 MG/ML
INJECTION INTRAMUSCULAR; INTRAVENOUS PRN
Status: DISCONTINUED | OUTPATIENT
Start: 2023-08-18 | End: 2023-08-18 | Stop reason: SDUPTHER

## 2023-08-18 RX ORDER — DEXAMETHASONE SODIUM PHOSPHATE 10 MG/ML
INJECTION INTRAMUSCULAR; INTRAVENOUS PRN
Status: DISCONTINUED | OUTPATIENT
Start: 2023-08-18 | End: 2023-08-18 | Stop reason: SDUPTHER

## 2023-08-18 RX ORDER — OXYCODONE HYDROCHLORIDE 5 MG/1
5 TABLET ORAL PRN
Status: COMPLETED | OUTPATIENT
Start: 2023-08-18 | End: 2023-08-18

## 2023-08-18 RX ORDER — MORPHINE SULFATE 4 MG/ML
4 INJECTION, SOLUTION INTRAMUSCULAR; INTRAVENOUS
Status: DISCONTINUED | OUTPATIENT
Start: 2023-08-18 | End: 2023-08-18 | Stop reason: HOSPADM

## 2023-08-18 RX ORDER — OXYCODONE HYDROCHLORIDE 5 MG/1
5 CAPSULE ORAL EVERY 4 HOURS PRN
Status: DISCONTINUED | OUTPATIENT
Start: 2023-08-18 | End: 2023-08-18 | Stop reason: HOSPADM

## 2023-08-18 RX ORDER — SODIUM CHLORIDE 9 MG/ML
INJECTION, SOLUTION INTRAVENOUS CONTINUOUS
Status: ACTIVE | OUTPATIENT
Start: 2023-08-18 | End: 2023-08-18

## 2023-08-18 RX ORDER — MORPHINE SULFATE 2 MG/ML
2 INJECTION, SOLUTION INTRAMUSCULAR; INTRAVENOUS
Status: DISCONTINUED | OUTPATIENT
Start: 2023-08-18 | End: 2023-08-18 | Stop reason: HOSPADM

## 2023-08-18 RX ORDER — LIDOCAINE HYDROCHLORIDE 20 MG/ML
INJECTION, SOLUTION INTRAVENOUS PRN
Status: DISCONTINUED | OUTPATIENT
Start: 2023-08-18 | End: 2023-08-18 | Stop reason: SDUPTHER

## 2023-08-18 RX ORDER — DEXTROSE MONOHYDRATE 100 MG/ML
INJECTION, SOLUTION INTRAVENOUS CONTINUOUS PRN
Status: DISCONTINUED | OUTPATIENT
Start: 2023-08-18 | End: 2023-08-18 | Stop reason: HOSPADM

## 2023-08-18 RX ORDER — HYDRALAZINE HYDROCHLORIDE 20 MG/ML
10 INJECTION INTRAMUSCULAR; INTRAVENOUS
Status: DISCONTINUED | OUTPATIENT
Start: 2023-08-18 | End: 2023-08-18 | Stop reason: HOSPADM

## 2023-08-18 RX ADMIN — LIDOCAINE HYDROCHLORIDE 60 MG: 20 INJECTION, SOLUTION INTRAVENOUS at 11:46

## 2023-08-18 RX ADMIN — ROCURONIUM BROMIDE 50 MG: 10 INJECTION INTRAVENOUS at 11:47

## 2023-08-18 RX ADMIN — SUGAMMADEX 200 MG: 100 INJECTION, SOLUTION INTRAVENOUS at 12:38

## 2023-08-18 RX ADMIN — SODIUM CHLORIDE, POTASSIUM CHLORIDE, SODIUM LACTATE AND CALCIUM CHLORIDE: 600; 310; 30; 20 INJECTION, SOLUTION INTRAVENOUS at 11:43

## 2023-08-18 RX ADMIN — PHENYLEPHRINE HYDROCHLORIDE 100 MCG: 10 INJECTION INTRAVENOUS at 12:05

## 2023-08-18 RX ADMIN — HYDROMORPHONE HYDROCHLORIDE 0.5 MG: 1 INJECTION, SOLUTION INTRAMUSCULAR; INTRAVENOUS; SUBCUTANEOUS at 13:20

## 2023-08-18 RX ADMIN — SODIUM CHLORIDE, POTASSIUM CHLORIDE, SODIUM LACTATE AND CALCIUM CHLORIDE: 600; 310; 30; 20 INJECTION, SOLUTION INTRAVENOUS at 12:30

## 2023-08-18 RX ADMIN — PHENYLEPHRINE HYDROCHLORIDE 200 MCG: 10 INJECTION INTRAVENOUS at 12:13

## 2023-08-18 RX ADMIN — PROPOFOL 200 MG: 10 INJECTION, EMULSION INTRAVENOUS at 11:47

## 2023-08-18 RX ADMIN — DEXAMETHASONE SODIUM PHOSPHATE 10 MG: 10 INJECTION INTRAMUSCULAR; INTRAVENOUS at 11:57

## 2023-08-18 RX ADMIN — HYDROMORPHONE HYDROCHLORIDE 0.5 MG: 1 INJECTION, SOLUTION INTRAMUSCULAR; INTRAVENOUS; SUBCUTANEOUS at 13:07

## 2023-08-18 RX ADMIN — ONDANSETRON 4 MG: 2 INJECTION INTRAMUSCULAR; INTRAVENOUS at 12:30

## 2023-08-18 RX ADMIN — SODIUM CHLORIDE, POTASSIUM CHLORIDE, SODIUM LACTATE AND CALCIUM CHLORIDE: 600; 310; 30; 20 INJECTION, SOLUTION INTRAVENOUS at 10:09

## 2023-08-18 RX ADMIN — PHENYLEPHRINE HYDROCHLORIDE 200 MCG: 10 INJECTION INTRAVENOUS at 12:09

## 2023-08-18 RX ADMIN — MIDAZOLAM HYDROCHLORIDE 2 MG: 1 INJECTION, SOLUTION INTRAMUSCULAR; INTRAVENOUS at 11:43

## 2023-08-18 RX ADMIN — ONDANSETRON 4 MG: 2 INJECTION INTRAMUSCULAR; INTRAVENOUS at 13:46

## 2023-08-18 RX ADMIN — OXYCODONE HYDROCHLORIDE 10 MG: 5 TABLET ORAL at 14:12

## 2023-08-18 RX ADMIN — FENTANYL CITRATE 100 MCG: 50 INJECTION, SOLUTION INTRAMUSCULAR; INTRAVENOUS at 11:46

## 2023-08-18 RX ADMIN — CEFAZOLIN 2000 MG: 2 INJECTION, POWDER, FOR SOLUTION INTRAMUSCULAR; INTRAVENOUS at 11:58

## 2023-08-18 ASSESSMENT — PAIN DESCRIPTION - DESCRIPTORS
DESCRIPTORS: SORE

## 2023-08-18 ASSESSMENT — PAIN DESCRIPTION - LOCATION
LOCATION: SHOULDER

## 2023-08-18 ASSESSMENT — PAIN SCALES - GENERAL
PAINLEVEL_OUTOF10: 6
PAINLEVEL_OUTOF10: 2
PAINLEVEL_OUTOF10: 3
PAINLEVEL_OUTOF10: 7
PAINLEVEL_OUTOF10: 5
PAINLEVEL_OUTOF10: 2
PAINLEVEL_OUTOF10: 6
PAINLEVEL_OUTOF10: 4
PAINLEVEL_OUTOF10: 7

## 2023-08-18 ASSESSMENT — PAIN DESCRIPTION - ORIENTATION
ORIENTATION: ANTERIOR
ORIENTATION: LEFT

## 2023-08-18 ASSESSMENT — PAIN DESCRIPTION - PAIN TYPE: TYPE: SURGICAL PAIN

## 2023-08-18 NOTE — OP NOTE
[place] [address] [address], [address] [facility]                                OPERATIVE REPORT    PATIENT NAME: Pito Hernandez                  :        1985  MED REC NO:   [id number]                            ROOM:  ACCOUNT NO:   [de-identified]                           ADMIT DATE: 2023  PROVIDER:     Fide Campuzano MD    DATE OF PROCEDURE:  2023    PREOPERATIVE DIAGNOSIS:  Left shoulder adhesive capsulitis. POSTOPERATIVE DIAGNOSES:  Left shoulder adhesive capsulitis plus partial  rotator cuff tear. PROCEDURES PERFORMED:  Left shoulder manipulation under anesthesia, left  shoulder arthroscopic lysis of adhesions and left, and left shoulder  arthroscopic limited debridement of partial rotator cuff tear. SURGEON:  Fide Campuzano MD    ASSISTANT:  Lilia Lane PA-C was present throughout the entire case. Given the nature of the disease process and the procedure, a skilled  surgical first assistant was necessary during the case. The assistant  was necessary to hold retractors and manipulate the extremity during the  procedure. A certified scrub tech was at the back table managing the  instruments and supplies for the surgical case. BLOOD LOSS:  Minimal.    FLUIDS:  Less than 2 liters. ANESTHESIA:  General with a scalene block. COMPLICATIONS:  None. INDICATIONS:  A 78-year-old male with ongoing shoulder pain and  stiffness, previous right shoulder release due to adhesions now for left  shoulder release due to failed conservative treatment for 6 months. SUMMATION OF EVENTS:  The patient was brought to the operating room and  after induction of anesthesia, gentle manipulation. Full range of  motion was easily achieved with abduction, internal, and external  rotation. At this time, after prep and drape, standard portals were  established in posterior to anterior and we inspected the glenohumeral  joint.

## 2023-08-18 NOTE — ANESTHESIA PRE PROCEDURE
Department of Anesthesiology  Preprocedure Note       Name:  Yves Espinoza   Age:  34 y.o.  :  1985                                          MRN:  [id number]         Date:  2023      Surgeon: Celeste Thompson):  Chester Briceño MD    Procedure: Procedure(s):  LEFT SHOULDER ARTHROSCOPIC LYSIS OF ADHESIONS AND MANIPULATION UNDER ANESTHESIA, LATERAL, SCALENE NERVE BLOCK    Medications prior to admission:   Prior to Admission medications    Medication Sig Start Date End Date Taking? Authorizing Provider   albuterol (PROVENTIL) (2.5 MG/3ML) 0.083% nebulizer solution USE 1 VIAL IN NEBULIZER EVERY 6 HOURS AS NEEDED FOR WHEEZING 23   Vincent Mccarthy MD   ADVAIR DISKUS 100-50 MCG/ACT AEPB diskus inhaler INHALE 1 PUFF INTO THE LUNGS EVERY 12 HOURS. 23   Vincent Mccarthy MD   methylPREDNISolone (MEDROL DOSEPACK) 4 MG tablet Take by mouth. Patient not taking: Reported on 2023   Vincent Mccarthy MD   metoprolol (LOPRESSOR) 100 MG tablet TAKE 1 TABLET BY MOUTH TWICE A DAY 23   Vincent Mccarthy MD   omeprazole (PRILOSEC) 40 MG delayed release capsule TAKE 1 CAPSULE BY MOUTH EVERY DAY 23   Vincent Mccarthy MD   testosterone cypionate (DEPOTESTOTERONE CYPIONATE) 200 MG/ML injection Inject 1 mL into the muscle every 14 days for 180 days. 3/9/23 9/5/23  Vincent Mccarthy MD   lamoTRIgine (LAMICTAL XR) 200 MG TB24 extended release tablet TAKE 1 TABLET BY MOUTH EVERY DAY 23   Vincent Mccarthy MD   tadalafil (CIALIS) 5 MG tablet TAKE 1 TABLET BY MOUTH EVERY DAY 23   Vincent Mccarthy MD   albuterol sulfate HFA (PROVENTIL;VENTOLIN;PROAIR) 108 (90 Base) MCG/ACT inhaler INHALE 2 PUFFS BY MOUTH EVERY 6 HOURS AS NEEDED FOR WHEEZE 22   Vincent Mccarthy MD   hydrOXYzine pamoate (VISTARIL) 25 MG capsule TAKE 1 CAPSULE BY MOUTH 3 TIMES DAILY AS NEEDED FOR ANXIETY.  22   Vincent Mccarthy MD   naproxen (NAPROSYN) 500 MG tablet TAKE 1 TABLET BY MOUTH TWICE A DAY AS NEEDED FOR PAIN 22   Vincent Mccarthy MD       Current

## 2023-08-18 NOTE — DISCHARGE INSTRUCTIONS
Medication given may have significant effects after discharge. Therefore on the day of surgery:  1) you must be accompanied by a responsible adult upon discharge and for 24 hours after surgery. Do not drive a motor vehicle, operate machinery, power tools or appliance, drink alcoholic beverages, or make critical decisions for 24 hours  2) Be aware of dizziness, which may cause a fall. Change positions slowly. 3) Eating: you may resume your regular diet but it is better to increase intake slowly with mild foods and working up to your regular diet. No greasy, fried or spicy foods today. 4) Nausea/Vomiting: Nausea and vomiting may occur as you become more active or begin to increase food intake. If this should happen, decrease activity and return to liquids. If the problem persists, call your surgeon  5) Pain: Your surgeon may have given you a prescription for pain medication. Take pain medication with food as prescribed. Pain medication may cause constipation, so drink plenty of fluids. If your pain medication does not provide adequate relief, call your surgeon  6) Urinating: Notify your surgeon if you have not urinated within 12 hours after discharge  7) Ice: Apply ice to operative site for 20 min 5-6 times a day or use Polar care as instructed  8) Dressing:   [x]   Remove dressing in 24hr    []  Remove dressing in 48hr   [x]  Leave open to air after initial dressing is taken off and incision is dry then ok to shower  Do not remove the steri-strips. (no bath/ hot tubs/ pools)   []  Leave dressing in place.  Keep dressing/ incision clean and dry    9) Activity    Shoulder/ elbow/Hand   [x]  Elevate extremity    [x]  Sling   [] at all times (except for exercises and showering)  [x] as needed only for comfort   [x] Begin daily motion exercises out of sling as instructed   [x]  Bend and flex fingers/ wrist/elbow frequently   [] other   Knee/ Ankle/ Foot   [] elevate extremity   [] crutches        [] non-weight bearing

## 2023-08-18 NOTE — PROGRESS NOTES
Alcie Ji notified of need for discharge instructions so pt. May go home. 1500-pt. Resting no c/o offered remains awaiting d/c instructions.

## 2023-08-18 NOTE — PROGRESS NOTES
Received from or into pacu on a cart accompanied in by tosha Poe, O2 on at 3L NCm pt coughing at intervals, cough is moist and non productive, breath sounds clear to anterior auscultation following patient's cough. SAo2 93%. See assessment.

## 2023-08-18 NOTE — BRIEF OP NOTE
Brief Postoperative Note      Patient: Main Richter  YOB: 1985  MRN: [id number]    Date of Procedure: 8/18/2023    Pre-Op Diagnosis Codes:     * Adhesive capsulitis of left shoulder [M75.02]    Post-Op Diagnosis: Same       Procedure(s):  LEFT SHOULDER ARTHROSCOPIC LYSIS OF ADHESIONS AND MANIPULATION UNDER ANESTHESIA WITH PARTIAL CUFF DEBRIDEMENT    Surgeon(s):  Maryann Andrade MD    Assistant:  Physician Assistant: Bj Suh PA-C    Anesthesia: General    Estimated Blood Loss (mL): Minimal    Complications: None    Specimens:   * No specimens in log *    Implants:  * No implants in log *      Drains: * No LDAs found *    Findings: adhesions, prct left shoulder      Electronically signed by Maryann Andrade MD on 8/18/2023 at 12:33 PM

## 2023-08-18 NOTE — PROGRESS NOTES
Pt's albuterol inhaler given to pt per tosha Palomino, pt used inhaler. Coughing subsiding, pt denies dyspnea.

## 2023-08-18 NOTE — ANESTHESIA POSTPROCEDURE EVALUATION
Department of Anesthesiology  Postprocedure Note    Patient: Nishant Rogers  MRN: [id number]  YOB: 1985  Date of evaluation: 8/18/2023      Procedure Summary     Date: 08/18/23 Room / Location: [address]    Anesthesia Start: 8872 Anesthesia Stop: 1255    Procedure: LEFT SHOULDER ARTHROSCOPIC LYSIS OF ADHESIONS AND MANIPULATION UNDER ANESTHESIA WITH PARTIAL CUFF DEBRIDEMENT (Left) Diagnosis:       Adhesive capsulitis of left shoulder      (Adhesive capsulitis of left shoulder [M75.02])    Surgeons: Macey Wang MD Responsible Provider: Nash Smith MD    Anesthesia Type: general ASA Status: 2          Anesthesia Type: No value filed.     Verna Phase I: Verna Score: 10    Verna Phase II:        Anesthesia Post Evaluation    Patient location during evaluation: bedside  Patient participation: complete - patient participated  Level of consciousness: awake and alert  Pain score: 0  Airway patency: patent  Nausea & Vomiting: no nausea and no vomiting  Complications: no  Cardiovascular status: blood pressure returned to baseline and hemodynamically stable  Respiratory status: acceptable  Hydration status: euvolemic  Pain management: adequate

## 2023-08-20 SDOH — ECONOMIC STABILITY: INCOME INSECURITY: HOW HARD IS IT FOR YOU TO PAY FOR THE VERY BASICS LIKE FOOD, HOUSING, MEDICAL CARE, AND HEATING?: PATIENT DECLINED

## 2023-08-20 SDOH — ECONOMIC STABILITY: HOUSING INSECURITY
IN THE LAST 12 MONTHS, WAS THERE A TIME WHEN YOU DID NOT HAVE A STEADY PLACE TO SLEEP OR SLEPT IN A SHELTER (INCLUDING NOW)?: PATIENT REFUSED

## 2023-08-20 SDOH — ECONOMIC STABILITY: FOOD INSECURITY: WITHIN THE PAST 12 MONTHS, THE FOOD YOU BOUGHT JUST DIDN'T LAST AND YOU DIDN'T HAVE MONEY TO GET MORE.: PATIENT DECLINED

## 2023-08-20 SDOH — ECONOMIC STABILITY: TRANSPORTATION INSECURITY
IN THE PAST 12 MONTHS, HAS LACK OF TRANSPORTATION KEPT YOU FROM MEETINGS, WORK, OR FROM GETTING THINGS NEEDED FOR DAILY LIVING?: PATIENT DECLINED

## 2023-08-20 SDOH — ECONOMIC STABILITY: FOOD INSECURITY: WITHIN THE PAST 12 MONTHS, YOU WORRIED THAT YOUR FOOD WOULD RUN OUT BEFORE YOU GOT MONEY TO BUY MORE.: PATIENT DECLINED

## 2023-08-21 ENCOUNTER — OFFICE VISIT (OUTPATIENT)
Dept: FAMILY MEDICINE CLINIC | Age: 38
End: 2023-08-21
Payer: COMMERCIAL

## 2023-08-21 VITALS
DIASTOLIC BLOOD PRESSURE: 80 MMHG | BODY MASS INDEX: 35.7 KG/M2 | OXYGEN SATURATION: 97 % | SYSTOLIC BLOOD PRESSURE: 126 MMHG | HEART RATE: 72 BPM | HEIGHT: 69 IN | TEMPERATURE: 97.6 F | WEIGHT: 241 LBS

## 2023-08-21 DIAGNOSIS — J40 BRONCHITIS: ICD-10-CM

## 2023-08-21 DIAGNOSIS — J45.40 MODERATE PERSISTENT ASTHMA WITHOUT COMPLICATION: ICD-10-CM

## 2023-08-21 DIAGNOSIS — G40.89 OTHER SEIZURES (HCC): ICD-10-CM

## 2023-08-21 DIAGNOSIS — R06.09 DYSPNEA ON EXERTION: ICD-10-CM

## 2023-08-21 DIAGNOSIS — Z11.59 ENCOUNTER FOR HEPATITIS C SCREENING TEST FOR LOW RISK PATIENT: ICD-10-CM

## 2023-08-21 DIAGNOSIS — E29.1 HYPOGONADISM IN MALE: ICD-10-CM

## 2023-08-21 DIAGNOSIS — J45.901 MILD ASTHMA WITH EXACERBATION, UNSPECIFIED WHETHER PERSISTENT: ICD-10-CM

## 2023-08-21 DIAGNOSIS — I10 ESSENTIAL HYPERTENSION: ICD-10-CM

## 2023-08-21 DIAGNOSIS — R56.9 CONVULSIONS, UNSPECIFIED CONVULSION TYPE (HCC): ICD-10-CM

## 2023-08-21 DIAGNOSIS — F41.9 ANXIETY AND DEPRESSION: ICD-10-CM

## 2023-08-21 DIAGNOSIS — E66.01 SEVERE OBESITY (BMI 35.0-39.9) WITH COMORBIDITY (HCC): ICD-10-CM

## 2023-08-21 DIAGNOSIS — Z11.4 SCREENING FOR HIV (HUMAN IMMUNODEFICIENCY VIRUS): ICD-10-CM

## 2023-08-21 DIAGNOSIS — F32.A ANXIETY AND DEPRESSION: ICD-10-CM

## 2023-08-21 DIAGNOSIS — N52.9 ERECTILE DYSFUNCTION, UNSPECIFIED ERECTILE DYSFUNCTION TYPE: ICD-10-CM

## 2023-08-21 DIAGNOSIS — K21.9 GASTRO-ESOPHAGEAL REFLUX DISEASE WITHOUT ESOPHAGITIS: ICD-10-CM

## 2023-08-21 DIAGNOSIS — F33.9 MAJOR DEPRESSIVE DISORDER, RECURRENT EPISODE WITH ANXIOUS DISTRESS (HCC): Primary | ICD-10-CM

## 2023-08-21 LAB — PSA SERPL-MCNC: 0.61 NG/ML (ref 0–4)

## 2023-08-21 PROCEDURE — 3079F DIAST BP 80-89 MM HG: CPT | Performed by: FAMILY MEDICINE

## 2023-08-21 PROCEDURE — 99214 OFFICE O/P EST MOD 30 MIN: CPT | Performed by: FAMILY MEDICINE

## 2023-08-21 PROCEDURE — 3074F SYST BP LT 130 MM HG: CPT | Performed by: FAMILY MEDICINE

## 2023-08-21 RX ORDER — HYDROXYZINE PAMOATE 25 MG/1
25 CAPSULE ORAL 3 TIMES DAILY PRN
Qty: 30 CAPSULE | Refills: 2 | Status: SHIPPED | OUTPATIENT
Start: 2023-08-21

## 2023-08-21 RX ORDER — OMEPRAZOLE 40 MG/1
CAPSULE, DELAYED RELEASE ORAL
Qty: 90 CAPSULE | Refills: 1 | Status: SHIPPED | OUTPATIENT
Start: 2023-08-21

## 2023-08-21 RX ORDER — FLUTICASONE PROPIONATE AND SALMETEROL 100; 50 UG/1; UG/1
1 POWDER RESPIRATORY (INHALATION) EVERY 12 HOURS
Qty: 60 EACH | Refills: 5 | Status: SHIPPED | OUTPATIENT
Start: 2023-08-21

## 2023-08-21 RX ORDER — TESTOSTERONE CYPIONATE 200 MG/ML
200 INJECTION, SOLUTION INTRAMUSCULAR
Qty: 6 ML | Refills: 1 | Status: SHIPPED | OUTPATIENT
Start: 2023-08-21 | End: 2024-02-17

## 2023-08-21 RX ORDER — LAMOTRIGINE 200 MG/1
200 TABLET, EXTENDED RELEASE ORAL DAILY
Qty: 90 TABLET | Refills: 1 | Status: SHIPPED | OUTPATIENT
Start: 2023-08-21

## 2023-08-21 RX ORDER — ALBUTEROL SULFATE 90 UG/1
AEROSOL, METERED RESPIRATORY (INHALATION)
Qty: 6.7 EACH | Refills: 5 | Status: SHIPPED | OUTPATIENT
Start: 2023-08-21

## 2023-08-21 RX ORDER — TADALAFIL 5 MG/1
5 TABLET ORAL DAILY
Qty: 90 TABLET | Refills: 1 | Status: SHIPPED | OUTPATIENT
Start: 2023-08-21

## 2023-08-21 RX ORDER — METOPROLOL TARTRATE 100 MG/1
100 TABLET ORAL 2 TIMES DAILY
Qty: 180 TABLET | Refills: 1 | Status: SHIPPED | OUTPATIENT
Start: 2023-08-21

## 2023-08-22 LAB
HEPATITIS C ANTIBODY: NONREACTIVE
HIV AG/AB: NONREACTIVE
SHBG SERPL-SCNC: 19 NMOL/L (ref 11–80)
TESTOST FREE SERPL-MCNC: 60 PG/ML (ref 47–244)
TESTOST SERPL-MCNC: 235 NG/DL (ref 220–1000)

## 2023-08-24 ENCOUNTER — HOSPITAL ENCOUNTER (OUTPATIENT)
Dept: PHYSICAL THERAPY | Age: 38
Setting detail: THERAPIES SERIES
Discharge: HOME | End: 2023-08-24

## 2023-09-02 ENCOUNTER — OFFICE VISIT (OUTPATIENT)
Dept: FAMILY MEDICINE CLINIC | Age: 38
End: 2023-09-02

## 2023-09-02 VITALS
TEMPERATURE: 98.7 F | BODY MASS INDEX: 37.36 KG/M2 | WEIGHT: 253 LBS | DIASTOLIC BLOOD PRESSURE: 82 MMHG | SYSTOLIC BLOOD PRESSURE: 120 MMHG | HEART RATE: 80 BPM | OXYGEN SATURATION: 98 %

## 2023-09-02 DIAGNOSIS — T63.481A INSECT STINGS, ACCIDENTAL OR UNINTENTIONAL, INITIAL ENCOUNTER: Primary | ICD-10-CM

## 2023-09-02 DIAGNOSIS — R22.42 LOCALIZED SWELLING OF LEFT LOWER EXTREMITY: ICD-10-CM

## 2023-09-02 RX ORDER — TRIAMCINOLONE ACETONIDE 40 MG/ML
60 INJECTION, SUSPENSION INTRA-ARTICULAR; INTRAMUSCULAR ONCE
Status: COMPLETED | OUTPATIENT
Start: 2023-09-02 | End: 2023-09-02

## 2023-09-02 RX ORDER — CETIRIZINE HYDROCHLORIDE 10 MG/1
10 TABLET ORAL DAILY
Qty: 14 TABLET | Refills: 0 | Status: SHIPPED | OUTPATIENT
Start: 2023-09-02 | End: 2023-09-16

## 2023-09-02 RX ADMIN — TRIAMCINOLONE ACETONIDE 60 MG: 40 INJECTION, SUSPENSION INTRA-ARTICULAR; INTRAMUSCULAR at 12:25

## 2023-09-02 NOTE — PROGRESS NOTES
% ointment      2. Localized swelling of left lower extremity  triamcinolone acetonide (KENALOG-40) injection 60 mg        No orders of the defined types were placed in this encounter. Orders Placed This Encounter   Medications    triamcinolone acetonide (KENALOG-40) injection 60 mg IM    cetirizine (ZYRTEC) 10 MG tablet     Sig: Take 1 tablet by mouth daily for 7-14 days     Dispense:  14 tablet     Refill:  0    triamcinolone (KENALOG) 0.1 % ointment     Sig: Apply topically 2 times daily for 7-14 days     Dispense:  80 g     Refill:  0         Return if symptoms worsen or fail to improve, for follow up with PCP. Reviewed with the patient: current clinical status & medications. Side effects, adverse effects of the medication administered/prescribed today, as well as treatment plan/rationale and result expectations have been discussed with the patient who expressed understanding. Close follow up to evaluate treatment results and for coordination of care. I have reviewed the patient's medical history in detail and updated the computerized patient record.       ELPIDIO Bradley NP

## 2023-09-03 ASSESSMENT — ENCOUNTER SYMPTOMS
WHEEZING: 0
COLOR CHANGE: 1
NAUSEA: 0
SHORTNESS OF BREATH: 0
CHEST TIGHTNESS: 0
FACIAL SWELLING: 0

## 2023-09-06 DIAGNOSIS — E29.1 HYPOGONADISM IN MALE: ICD-10-CM

## 2023-09-06 NOTE — TELEPHONE ENCOUNTER
Pt called and is questioning why his script for testosterone has not been called into [facility]. Dr Won Boyer changed script to 150 mg every week Can this be called in I will check in the morning to see if you received this message pt was upset this has not been done yet because he is almost out of this medication.

## 2023-09-07 DIAGNOSIS — E29.1 HYPOGONADISM IN MALE: ICD-10-CM

## 2023-09-07 RX ORDER — TESTOSTERONE CYPIONATE 200 MG/ML
200 INJECTION, SOLUTION INTRAMUSCULAR
Qty: 6 ML | Refills: 1 | Status: SHIPPED | OUTPATIENT
Start: 2023-09-07 | End: 2023-09-08 | Stop reason: DRUGHIGH

## 2023-09-07 NOTE — TELEPHONE ENCOUNTER
Pt states this medication was called into the pharmacy but the directions were not changed, and not the medication has be rejected. Please correct directions.  Pt phone number is [phone]

## 2023-10-13 PROBLEM — S13.9XXA CERVICAL SPRAIN: Status: ACTIVE | Noted: 2023-10-13

## 2023-10-13 PROBLEM — M75.81 TENDINITIS OF RIGHT ROTATOR CUFF: Status: ACTIVE | Noted: 2023-10-13

## 2023-10-13 PROBLEM — Z41.9 SURGERY, ELECTIVE: Status: ACTIVE | Noted: 2023-10-13

## 2023-10-13 PROBLEM — M75.00 ADHESIVE CAPSULITIS OF SHOULDER: Status: ACTIVE | Noted: 2023-10-13

## 2023-10-13 PROBLEM — S43.439A LABRAL TEAR OF SHOULDER: Status: ACTIVE | Noted: 2023-10-13

## 2023-10-13 PROBLEM — M47.22 OSTEOARTHRITIS OF SPINE WITH RADICULOPATHY, CERVICAL REGION: Status: ACTIVE | Noted: 2023-10-13

## 2023-10-13 PROBLEM — M75.100 ROTATOR CUFF TEAR: Status: ACTIVE | Noted: 2023-10-13

## 2023-10-13 PROBLEM — M25.519 SHOULDER PAIN: Status: ACTIVE | Noted: 2023-10-13

## 2023-10-13 RX ORDER — FLUTICASONE PROPIONATE AND SALMETEROL 100; 50 UG/1; UG/1
1 POWDER RESPIRATORY (INHALATION)
COMMUNITY

## 2023-10-13 RX ORDER — TADALAFIL 5 MG/1
5 TABLET ORAL
COMMUNITY

## 2023-10-13 RX ORDER — OMEPRAZOLE 40 MG/1
40 CAPSULE, DELAYED RELEASE ORAL
COMMUNITY

## 2023-10-13 RX ORDER — ALBUTEROL SULFATE 90 UG/1
2 AEROSOL, METERED RESPIRATORY (INHALATION)
COMMUNITY

## 2023-10-13 RX ORDER — OXYCODONE AND ACETAMINOPHEN 5; 325 MG/1; MG/1
1 TABLET ORAL EVERY 6 HOURS
COMMUNITY
Start: 2022-12-01

## 2023-10-13 RX ORDER — TESTOSTERONE CYPIONATE 200 MG/ML
INJECTION, SOLUTION INTRAMUSCULAR
COMMUNITY

## 2023-10-16 ENCOUNTER — APPOINTMENT (OUTPATIENT)
Dept: ORTHOPEDIC SURGERY | Facility: CLINIC | Age: 38
End: 2023-10-16
Payer: COMMERCIAL

## 2023-10-31 DIAGNOSIS — N52.9 ERECTILE DYSFUNCTION, UNSPECIFIED ERECTILE DYSFUNCTION TYPE: ICD-10-CM

## 2023-10-31 RX ORDER — TADALAFIL 5 MG/1
5 TABLET ORAL DAILY
Qty: 90 TABLET | Refills: 1 | Status: SHIPPED | OUTPATIENT
Start: 2023-10-31

## 2023-10-31 RX ORDER — FLUTICASONE PROPIONATE AND SALMETEROL 100; 50 UG/1; UG/1
1 POWDER RESPIRATORY (INHALATION) EVERY 12 HOURS
Qty: 60 EACH | Refills: 5 | Status: SHIPPED | OUTPATIENT
Start: 2023-10-31

## 2024-01-08 DIAGNOSIS — F41.9 ANXIETY AND DEPRESSION: ICD-10-CM

## 2024-01-08 DIAGNOSIS — F32.A ANXIETY AND DEPRESSION: ICD-10-CM

## 2024-01-08 RX ORDER — LAMOTRIGINE 200 MG/1
200 TABLET, EXTENDED RELEASE ORAL DAILY
Qty: 90 TABLET | Refills: 1 | Status: SHIPPED | OUTPATIENT
Start: 2024-01-08

## 2024-02-26 ENCOUNTER — PATIENT MESSAGE (OUTPATIENT)
Dept: FAMILY MEDICINE CLINIC | Age: 39
End: 2024-02-26

## 2024-02-26 DIAGNOSIS — N52.9 ERECTILE DYSFUNCTION, UNSPECIFIED ERECTILE DYSFUNCTION TYPE: ICD-10-CM

## 2024-02-26 DIAGNOSIS — F32.A ANXIETY AND DEPRESSION: ICD-10-CM

## 2024-02-26 DIAGNOSIS — F41.9 ANXIETY AND DEPRESSION: ICD-10-CM

## 2024-02-26 RX ORDER — TADALAFIL 5 MG/1
5 TABLET ORAL DAILY
Qty: 90 TABLET | Refills: 1 | Status: SHIPPED | OUTPATIENT
Start: 2024-02-26 | End: 2024-02-27 | Stop reason: SDUPTHER

## 2024-02-26 RX ORDER — LAMOTRIGINE 200 MG/1
200 TABLET, EXTENDED RELEASE ORAL DAILY
Qty: 90 TABLET | Refills: 1 | Status: SHIPPED | OUTPATIENT
Start: 2024-02-26

## 2024-02-26 NOTE — TELEPHONE ENCOUNTER
From: Gonzalo Jasmine  To: Dr. Harry Tarango  Sent: 2/26/2024 9:42 AM EST  Subject: Medication Pre-Authorization     Hi, I needed refills for 2 of my medications and they’re not covered by my new insurance. I called them and they said the doctor had to call for a pre-authorization. Here is the info     LamoTRIgine ER 200MG  Tadalafil 5MG    [facility] medical   Provider line services [phone] option 2  Member ID [id number]      Thank you. Please let me know when this is completed.         Baudilio Jasmine

## 2024-02-27 ENCOUNTER — OFFICE VISIT (OUTPATIENT)
Dept: FAMILY MEDICINE CLINIC | Age: 39
End: 2024-02-27
Payer: COMMERCIAL

## 2024-02-27 ENCOUNTER — TELEPHONE (OUTPATIENT)
Dept: FAMILY MEDICINE CLINIC | Age: 39
End: 2024-02-27

## 2024-02-27 VITALS
HEIGHT: 69 IN | BODY MASS INDEX: 33.92 KG/M2 | TEMPERATURE: 97.3 F | HEART RATE: 70 BPM | OXYGEN SATURATION: 97 % | SYSTOLIC BLOOD PRESSURE: 124 MMHG | DIASTOLIC BLOOD PRESSURE: 72 MMHG | WEIGHT: 229 LBS

## 2024-02-27 DIAGNOSIS — R56.9 CONVULSIONS, UNSPECIFIED CONVULSION TYPE (HCC): ICD-10-CM

## 2024-02-27 DIAGNOSIS — R50.9 FEVER, UNSPECIFIED FEVER CAUSE: Primary | ICD-10-CM

## 2024-02-27 DIAGNOSIS — J10.1 INFLUENZA B: ICD-10-CM

## 2024-02-27 DIAGNOSIS — F33.9 MAJOR DEPRESSIVE DISORDER, RECURRENT EPISODE WITH ANXIOUS DISTRESS (HCC): ICD-10-CM

## 2024-02-27 DIAGNOSIS — N52.9 ERECTILE DYSFUNCTION, UNSPECIFIED ERECTILE DYSFUNCTION TYPE: ICD-10-CM

## 2024-02-27 PROCEDURE — 99213 OFFICE O/P EST LOW 20 MIN: CPT | Performed by: FAMILY MEDICINE

## 2024-02-27 PROCEDURE — 3074F SYST BP LT 130 MM HG: CPT | Performed by: FAMILY MEDICINE

## 2024-02-27 PROCEDURE — 3078F DIAST BP <80 MM HG: CPT | Performed by: FAMILY MEDICINE

## 2024-02-27 PROCEDURE — 87804 INFLUENZA ASSAY W/OPTIC: CPT | Performed by: FAMILY MEDICINE

## 2024-02-27 PROCEDURE — 87426 SARSCOV CORONAVIRUS AG IA: CPT | Performed by: FAMILY MEDICINE

## 2024-02-27 RX ORDER — TADALAFIL 5 MG/1
5 TABLET ORAL DAILY
Qty: 30 TABLET | Refills: 5 | Status: SHIPPED | OUTPATIENT
Start: 2024-02-27

## 2024-02-27 RX ORDER — OSELTAMIVIR PHOSPHATE 75 MG/1
75 CAPSULE ORAL 2 TIMES DAILY
Qty: 10 CAPSULE | Refills: 0 | Status: SHIPPED | OUTPATIENT
Start: 2024-02-27 | End: 2024-03-03

## 2024-02-27 ASSESSMENT — PATIENT HEALTH QUESTIONNAIRE - PHQ9
3. TROUBLE FALLING OR STAYING ASLEEP: 2
1. LITTLE INTEREST OR PLEASURE IN DOING THINGS: 0
6. FEELING BAD ABOUT YOURSELF - OR THAT YOU ARE A FAILURE OR HAVE LET YOURSELF OR YOUR FAMILY DOWN: 0
2. FEELING DOWN, DEPRESSED OR HOPELESS: 0
7. TROUBLE CONCENTRATING ON THINGS, SUCH AS READING THE NEWSPAPER OR WATCHING TELEVISION: 1
8. MOVING OR SPEAKING SO SLOWLY THAT OTHER PEOPLE COULD HAVE NOTICED. OR THE OPPOSITE, BEING SO FIGETY OR RESTLESS THAT YOU HAVE BEEN MOVING AROUND A LOT MORE THAN USUAL: 0
SUM OF ALL RESPONSES TO PHQ QUESTIONS 1-9: 6
SUM OF ALL RESPONSES TO PHQ QUESTIONS 1-9: 6
9. THOUGHTS THAT YOU WOULD BE BETTER OFF DEAD, OR OF HURTING YOURSELF: 0
5. POOR APPETITE OR OVEREATING: 1
SUM OF ALL RESPONSES TO PHQ QUESTIONS 1-9: 6
SUM OF ALL RESPONSES TO PHQ QUESTIONS 1-9: 6
4. FEELING TIRED OR HAVING LITTLE ENERGY: 2
SUM OF ALL RESPONSES TO PHQ9 QUESTIONS 1 & 2: 0
10. IF YOU CHECKED OFF ANY PROBLEMS, HOW DIFFICULT HAVE THESE PROBLEMS MADE IT FOR YOU TO DO YOUR WORK, TAKE CARE OF THINGS AT HOME, OR GET ALONG WITH OTHER PEOPLE: 1

## 2024-02-27 NOTE — TELEPHONE ENCOUNTER
Pt's Tadalafil PA denied. Will be covered for BPH with previous trials of dutasteride or finasteride along with a trail of one of: alfuzosin, doxazosin, prazosin, terazosin, or tamsulosin.  Please advise

## 2024-02-27 NOTE — PROGRESS NOTES
unspecified erectile dysfunction type  tadalafil (CIALIS) 5 MG tablet    oseltamivir (TAMIFLU) 75 MG capsule      3. Major depressive disorder, recurrent episode with anxious distress (HCC)        4. Convulsions, unspecified convulsion type (HCC)        5. Influenza B          Positive flu B    Chronic conditions are stable  Continue current regimen  Follow up with appropriate specialists and here routinely for ongoing monitoring of chronic conditions          Harry Tarango MD

## 2024-03-29 ENCOUNTER — PATIENT MESSAGE (OUTPATIENT)
Dept: FAMILY MEDICINE CLINIC | Age: 39
End: 2024-03-29

## 2024-03-29 RX ORDER — TAMSULOSIN HYDROCHLORIDE 0.4 MG/1
0.4 CAPSULE ORAL DAILY
Qty: 30 CAPSULE | Refills: 3 | Status: SHIPPED | OUTPATIENT
Start: 2024-03-29

## 2024-03-29 NOTE — TELEPHONE ENCOUNTER
From: Gonzalo Jasmine  To: Dr. Harry Tarango  Sent: 3/29/2024 2:20 PM EDT  Subject: Flomax    Could I get a prescription of flomax. I kept some for when I get kidney stones and out of them. I could really use it right now

## 2024-04-05 DIAGNOSIS — K21.9 GASTRO-ESOPHAGEAL REFLUX DISEASE WITHOUT ESOPHAGITIS: ICD-10-CM

## 2024-04-05 DIAGNOSIS — F32.A ANXIETY AND DEPRESSION: ICD-10-CM

## 2024-04-05 DIAGNOSIS — F41.9 ANXIETY AND DEPRESSION: ICD-10-CM

## 2024-04-05 RX ORDER — OMEPRAZOLE 40 MG/1
CAPSULE, DELAYED RELEASE ORAL
Qty: 90 CAPSULE | Refills: 1 | Status: SHIPPED | OUTPATIENT
Start: 2024-04-05

## 2024-04-05 RX ORDER — HYDROXYZINE PAMOATE 25 MG/1
25 CAPSULE ORAL 3 TIMES DAILY PRN
Qty: 30 CAPSULE | Refills: 2 | Status: SHIPPED | OUTPATIENT
Start: 2024-04-05

## 2024-04-05 RX ORDER — LAMOTRIGINE 200 MG/1
200 TABLET, EXTENDED RELEASE ORAL DAILY
Qty: 90 TABLET | Refills: 1 | Status: SHIPPED | OUTPATIENT
Start: 2024-04-05

## 2024-04-29 ENCOUNTER — OFFICE VISIT (OUTPATIENT)
Dept: FAMILY MEDICINE CLINIC | Age: 39
End: 2024-04-29
Payer: COMMERCIAL

## 2024-04-29 VITALS
SYSTOLIC BLOOD PRESSURE: 132 MMHG | WEIGHT: 237.8 LBS | OXYGEN SATURATION: 97 % | HEIGHT: 69 IN | TEMPERATURE: 98.7 F | DIASTOLIC BLOOD PRESSURE: 80 MMHG | BODY MASS INDEX: 35.22 KG/M2 | HEART RATE: 91 BPM

## 2024-04-29 DIAGNOSIS — K61.1 PERIRECTAL ABSCESS: Primary | ICD-10-CM

## 2024-04-29 DIAGNOSIS — R09.81 NASAL CONGESTION: ICD-10-CM

## 2024-04-29 DIAGNOSIS — E66.01 SEVERE OBESITY (BMI 35.0-39.9) WITH COMORBIDITY (HCC): ICD-10-CM

## 2024-04-29 PROCEDURE — 3075F SYST BP GE 130 - 139MM HG: CPT | Performed by: FAMILY MEDICINE

## 2024-04-29 PROCEDURE — 99213 OFFICE O/P EST LOW 20 MIN: CPT | Performed by: FAMILY MEDICINE

## 2024-04-29 PROCEDURE — 3079F DIAST BP 80-89 MM HG: CPT | Performed by: FAMILY MEDICINE

## 2024-04-29 RX ORDER — AMOXICILLIN AND CLAVULANATE POTASSIUM 875; 125 MG/1; MG/1
1 TABLET, FILM COATED ORAL 2 TIMES DAILY
Qty: 20 TABLET | Refills: 0 | Status: SHIPPED | OUTPATIENT
Start: 2024-04-29 | End: 2024-05-09

## 2024-04-29 NOTE — PROGRESS NOTES
Chief Complaint   Patient presents with    Congestion     Still has congestion since he had the flu    Medication Problem     When getting testosterone injections get swollen in genitals and has trouble urinating, does have kidney stones        HPI:  Gonzalo Jasmine is a 38 y.o. male    Chest congestion    Perirectal abscess  Rectal irritation      lamictal XR  Working pretty well    Wt Readings from Last 3 Encounters:   04/29/24 107.9 kg (237 lb 12.8 oz)   02/27/24 103.9 kg (229 lb)   09/02/23 114.8 kg (253 lb)         Past Medical History:   Diagnosis Date    Asthma     Dermatitis     Fatty liver     Gastroesophageal reflux disease with hiatal hernia     Hypertension     Major depressive disorder, recurrent episode with anxious distress (HCC) 8/21/2023    Obesity (BMI 30-39.9)     Unspecified convulsions 8/21/2023     Past Surgical History:   Procedure Laterality Date    COLONOSCOPY  2013    ENDOSCOPY, COLON, DIAGNOSTIC  2013    LYMPH NODE BIOPSY      surgery due to cat scratch disease age 1    MEDICATION INJECTION Right 03/24/2023    steroid injection by Dr. Davis    SHOULDER ARTHROSCOPY Right 12/08/2022    RIGHT SHOULDER ARTHROSCOPY ROTATOR CUFF REPAIR, ARTHROSCOPY SUBACROMIAL DECOMPRESSION DEBRIDEMENT performed by Jose Quiñones MD at [facility] OR    SHOULDER ARTHROSCOPY Left 8/18/2023    LEFT SHOULDER ARTHROSCOPIC LYSIS OF ADHESIONS AND MANIPULATION UNDER ANESTHESIA WITH PARTIAL CUFF DEBRIDEMENT performed by Jose Quiñones MD at [facility] [place]    WISDOM TOOTH EXTRACTION       Family History   Problem Relation Age of Onset    Cancer Mother     Diabetes Paternal Grandmother     Diabetes Paternal Grandfather      Social History     Socioeconomic History    Marital status:      Spouse name: None    Number of children: 1    Years of education: None    Highest education level: None   Tobacco Use    Smoking status: Never    Smokeless tobacco: Never   Substance and Sexual Activity    Alcohol use: No

## 2024-05-02 ENCOUNTER — OFFICE VISIT (OUTPATIENT)
Dept: FAMILY MEDICINE CLINIC | Age: 39
End: 2024-05-02

## 2024-05-02 VITALS — HEIGHT: 69 IN | TEMPERATURE: 98.7 F | BODY MASS INDEX: 35.1 KG/M2 | WEIGHT: 237 LBS

## 2024-05-02 DIAGNOSIS — L73.9 FOLLICULITIS: Primary | ICD-10-CM

## 2024-05-02 DIAGNOSIS — C44.311 BASAL CELL CARCINOMA (BCC) OF SKIN OF NOSE: ICD-10-CM

## 2024-05-02 ASSESSMENT — ENCOUNTER SYMPTOMS: COLOR CHANGE: 1

## 2024-05-02 NOTE — PROGRESS NOTES
per day.       Remember that the reason for cryosurgery to be done is to damage skin that is not normal so that it will be removed.  Therefore the area could be red or pink, can sting or burn for the first day or 2, will appear raw when the skin sloughs off.    When the blister(s) pop or patient removes the top as instructed between day 3-5, apply antibiotic (NOT triple antibiotic, one brand is Neosporin) ointment, usually Bacitracin, and a bandage to affected area(s).  The ointment should be applied to the open area as long as it is not covered with skin.  Exposed tissue is meant to be moist.      Once a scab is formed the patient may stop applying ointment.  The scab may appear yellow while moist, don't confuse this with infection.  If the wound is infection pus will drain from the site. If this treatment was for a large wart you may note that a plug of skin may fall out of the area that was treated.  That is the center of the wart and it is appropriate for it to come out.  If exposed skin remains, treat that area as you would a ruptured blister as mentioned above.    Bacitracin sample supplied               DO NOT USE TRIPLE ANTIBIOTIC ON THE WOUND    It is best to leave blisters alone if they form. They should be covered with a bandage to prevent blister breakage and dirt exposure.  The wounds should remain dry while there is a blister, therefore if this is a sweaty location like the foot you may need to change socks multiple times per day.   If blister(s) pop or patient pops with a sterilized needle, apply antibiotic (NOT triple antibiotic, one brand is Neosporin) ointment and a bandage to affected area(s).  The ointment should be applied to the open area as long as it is not covered with skin.  Exposed tissue is meant to be moist.  Once a scab formed she may stop applying ointment.  If this treatment was for a large wart you may note that a plug of skin may fall out of the area that was treated.  That is the

## 2024-05-02 NOTE — PATIENT INSTRUCTIONS
Patient has been educated on white kitchen vinegar/acetic acid 5% soaks 2 to 3 minutes 2-3 times a week to help prevent folliculitis on the discussed affected skin surfaces    Cryotherapy instructions    Post op instructions given. A printed copy provided.    It is best to leave blisters alone if they form for the first 1-3 days to allow the desired damaged tissue (precancer lesion, wart, or whatever lesion is being removed) to separate from healthy tissue.     The area should be covered with a bandage to prevent blister breakage and dirt exposure.      The wounds should remain dry while there is a blister, therefore if this is a sweaty location, like the foot ,you may need to change clothing, such as socks, multiple times per day.       Remember that the reason for cryosurgery to be done is to damage skin that is not normal so that it will be removed.  Therefore the area could be red or pink, can sting or burn for the first day or 2, will appear raw when the skin sloughs off.    When the blister(s) pop or patient removes the top as instructed between day 3-5, apply antibiotic (NOT triple antibiotic, one brand is Neosporin) ointment, usually Bacitracin, and a bandage to affected area(s).  The ointment should be applied to the open area as long as it is not covered with skin.  Exposed tissue is meant to be moist.      Once a scab is formed the patient may stop applying ointment.  The scab may appear yellow while moist, don't confuse this with infection.  If the wound is infection pus will drain from the site. If this treatment was for a large wart you may note that a plug of skin may fall out of the area that was treated.  That is the center of the wart and it is appropriate for it to come out.  If exposed skin remains, treat that area as you would a ruptured blister as mentioned above.    Bacitracin sample supplied

## 2024-05-14 ENCOUNTER — OFFICE VISIT (OUTPATIENT)
Dept: SURGERY | Age: 39
End: 2024-05-14
Payer: COMMERCIAL

## 2024-05-14 VITALS
WEIGHT: 237 LBS | OXYGEN SATURATION: 98 % | HEIGHT: 69 IN | TEMPERATURE: 98.3 F | BODY MASS INDEX: 35.1 KG/M2 | HEART RATE: 95 BPM

## 2024-05-14 DIAGNOSIS — K60.3 ANAL FISTULA: Primary | ICD-10-CM

## 2024-05-14 PROCEDURE — 99213 OFFICE O/P EST LOW 20 MIN: CPT | Performed by: COLON & RECTAL SURGERY

## 2024-05-14 ASSESSMENT — ENCOUNTER SYMPTOMS
CHEST TIGHTNESS: 0
CONSTIPATION: 0
SHORTNESS OF BREATH: 0
BLOOD IN STOOL: 0
ABDOMINAL PAIN: 0
COLOR CHANGE: 0
RECTAL PAIN: 1
DIARRHEA: 0

## 2024-05-14 NOTE — PROGRESS NOTES
Subjective:      Patient ID: Gonzalo Jasmine is a 38 y.o. male who presents for:  Chief Complaint   Patient presents with    Perirectal Abscess       This is a 38-year-old male seen in the past for an anal fissure.  He has an intermittent draining tract from around his anal area.    This is been going on for at least the past 3 to 4 weeks possibly longer.    Past medical and surgical history was reviewed and unchanged from previous visit        Past Medical History:   Diagnosis Date    Asthma     Dermatitis     Fatty liver     Gastroesophageal reflux disease with hiatal hernia     Hypertension     Major depressive disorder, recurrent episode with anxious distress (HCC) 8/21/2023    Obesity (BMI 30-39.9)     Unspecified convulsions 8/21/2023     Past Surgical History:   Procedure Laterality Date    COLONOSCOPY  2013    ENDOSCOPY, COLON, DIAGNOSTIC  2013    LYMPH NODE BIOPSY      surgery due to cat scratch disease age 1    MEDICATION INJECTION Right 03/24/2023    steroid injection by Dr. Davis    SHOULDER ARTHROSCOPY Right 12/08/2022    RIGHT SHOULDER ARTHROSCOPY ROTATOR CUFF REPAIR, ARTHROSCOPY SUBACROMIAL DECOMPRESSION DEBRIDEMENT performed by Jose Quiñones MD at [facility] OR    SHOULDER ARTHROSCOPY Left 8/18/2023    LEFT SHOULDER ARTHROSCOPIC LYSIS OF ADHESIONS AND MANIPULATION UNDER ANESTHESIA WITH PARTIAL CUFF DEBRIDEMENT performed by Jose Quiñones MD at [facility] OR    WISDOM TOOTH EXTRACTION       Social History     Socioeconomic History    Marital status:      Spouse name: Not on file    Number of children: 1    Years of education: Not on file    Highest education level: Not on file   Occupational History    Not on file   Tobacco Use    Smoking status: Never    Smokeless tobacco: Never   Substance and Sexual Activity    Alcohol use: No     Alcohol/week: 0.0 standard drinks of alcohol    Drug use: Yes     Types: Marijuana (Weed)     Comment: gummies    Sexual activity: Yes     Partners: Female   Other

## 2024-09-09 ENCOUNTER — OFFICE VISIT (OUTPATIENT)
Dept: FAMILY MEDICINE CLINIC | Age: 39
End: 2024-09-09
Payer: COMMERCIAL

## 2024-09-09 VITALS
HEART RATE: 101 BPM | BODY MASS INDEX: 34.66 KG/M2 | TEMPERATURE: 98 F | OXYGEN SATURATION: 98 % | WEIGHT: 234 LBS | HEIGHT: 69 IN

## 2024-09-09 DIAGNOSIS — L82.1 SEBORRHEIC KERATOSES: Primary | ICD-10-CM

## 2024-09-09 PROCEDURE — 99213 OFFICE O/P EST LOW 20 MIN: CPT | Performed by: FAMILY MEDICINE

## 2024-09-09 SDOH — ECONOMIC STABILITY: FOOD INSECURITY: WITHIN THE PAST 12 MONTHS, YOU WORRIED THAT YOUR FOOD WOULD RUN OUT BEFORE YOU GOT MONEY TO BUY MORE.: NEVER TRUE

## 2024-09-09 SDOH — ECONOMIC STABILITY: FOOD INSECURITY: WITHIN THE PAST 12 MONTHS, THE FOOD YOU BOUGHT JUST DIDN'T LAST AND YOU DIDN'T HAVE MONEY TO GET MORE.: NEVER TRUE

## 2024-09-09 SDOH — ECONOMIC STABILITY: INCOME INSECURITY: HOW HARD IS IT FOR YOU TO PAY FOR THE VERY BASICS LIKE FOOD, HOUSING, MEDICAL CARE, AND HEATING?: NOT HARD AT ALL

## 2024-09-09 ASSESSMENT — ENCOUNTER SYMPTOMS: COLOR CHANGE: 1

## 2024-09-19 ENCOUNTER — PATIENT MESSAGE (OUTPATIENT)
Dept: FAMILY MEDICINE CLINIC | Age: 39
End: 2024-09-19

## 2024-09-19 DIAGNOSIS — J45.40 MODERATE PERSISTENT ASTHMA WITHOUT COMPLICATION: ICD-10-CM

## 2024-09-19 RX ORDER — METHYLPREDNISOLONE 4 MG
TABLET, DOSE PACK ORAL
Qty: 1 KIT | Refills: 0 | Status: SHIPPED | OUTPATIENT
Start: 2024-09-19

## 2024-09-19 RX ORDER — FLUTICASONE PROPIONATE AND SALMETEROL 100; 50 UG/1; UG/1
1 POWDER RESPIRATORY (INHALATION) EVERY 12 HOURS
Qty: 60 EACH | Refills: 5 | Status: SHIPPED | OUTPATIENT
Start: 2024-09-19

## 2024-09-23 DIAGNOSIS — J20.9 ACUTE BRONCHITIS, UNSPECIFIED ORGANISM: ICD-10-CM

## 2024-09-24 RX ORDER — ALBUTEROL SULFATE 0.83 MG/ML
SOLUTION RESPIRATORY (INHALATION)
Qty: 150 ML | Refills: 0 | Status: SHIPPED | OUTPATIENT
Start: 2024-09-24

## 2024-11-06 DIAGNOSIS — K21.9 GASTRO-ESOPHAGEAL REFLUX DISEASE WITHOUT ESOPHAGITIS: ICD-10-CM

## 2024-11-07 RX ORDER — OMEPRAZOLE 40 MG/1
CAPSULE, DELAYED RELEASE ORAL
Qty: 90 CAPSULE | Refills: 0 | Status: SHIPPED | OUTPATIENT
Start: 2024-11-07

## 2024-11-07 NOTE — TELEPHONE ENCOUNTER
Comments: loree sent    Last Office Visit (last PCP visit):   4/29/2024    Next Visit Date:  No future appointments.    **If hasn't been seen in over a year OR hasn't followed up according to last diabetes/ADHD visit, make appointment for patient before sending refill to provider.    Rx requested:  Requested Prescriptions     Pending Prescriptions Disp Refills    omeprazole (PRILOSEC) 40 MG delayed release capsule [Pharmacy Med Name: Omeprazole Oral Capsule Delayed Release 40 MG] 90 capsule 0     Sig: TAKE ONE CAPSULE BY MOUTH EVERY DAY

## 2024-11-25 ENCOUNTER — OFFICE VISIT (OUTPATIENT)
Dept: FAMILY MEDICINE CLINIC | Age: 39
End: 2024-11-25
Payer: COMMERCIAL

## 2024-11-25 VITALS
DIASTOLIC BLOOD PRESSURE: 78 MMHG | BODY MASS INDEX: 34.96 KG/M2 | HEART RATE: 92 BPM | TEMPERATURE: 97.9 F | OXYGEN SATURATION: 97 % | SYSTOLIC BLOOD PRESSURE: 142 MMHG | WEIGHT: 236 LBS | HEIGHT: 69 IN

## 2024-11-25 DIAGNOSIS — F41.9 ANXIETY: Primary | ICD-10-CM

## 2024-11-25 DIAGNOSIS — B96.89 ACUTE BACTERIAL SINUSITIS: ICD-10-CM

## 2024-11-25 DIAGNOSIS — J01.90 ACUTE BACTERIAL SINUSITIS: ICD-10-CM

## 2024-11-25 PROCEDURE — 3078F DIAST BP <80 MM HG: CPT | Performed by: FAMILY MEDICINE

## 2024-11-25 PROCEDURE — 3077F SYST BP >= 140 MM HG: CPT | Performed by: FAMILY MEDICINE

## 2024-11-25 PROCEDURE — 99213 OFFICE O/P EST LOW 20 MIN: CPT | Performed by: FAMILY MEDICINE

## 2024-11-25 RX ORDER — HYDROXYZINE PAMOATE 25 MG/1
25 CAPSULE ORAL 3 TIMES DAILY PRN
Qty: 30 CAPSULE | Refills: 2 | Status: SHIPPED | OUTPATIENT
Start: 2024-11-25
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
## 2025-01-02 DIAGNOSIS — R06.09 DYSPNEA ON EXERTION: ICD-10-CM

## 2025-01-02 DIAGNOSIS — J40 BRONCHITIS: ICD-10-CM

## 2025-01-02 DIAGNOSIS — J20.9 ACUTE BRONCHITIS, UNSPECIFIED ORGANISM: ICD-10-CM

## 2025-01-02 DIAGNOSIS — J45.901 MILD ASTHMA WITH EXACERBATION, UNSPECIFIED WHETHER PERSISTENT: ICD-10-CM

## 2025-01-03 ENCOUNTER — PATIENT MESSAGE (OUTPATIENT)
Dept: FAMILY MEDICINE CLINIC | Age: 40
End: 2025-01-03

## 2025-01-03 DIAGNOSIS — E29.1 HYPOGONADISM IN MALE: Primary | ICD-10-CM

## 2025-01-03 RX ORDER — ALBUTEROL SULFATE 90 UG/1
INHALANT RESPIRATORY (INHALATION)
Qty: 6.7 G | Refills: 0 | Status: SHIPPED | OUTPATIENT
Start: 2025-01-03

## 2025-01-03 RX ORDER — ALBUTEROL SULFATE 0.83 MG/ML
SOLUTION RESPIRATORY (INHALATION)
Qty: 150 ML | Refills: 0 | Status: SHIPPED | OUTPATIENT
Start: 2025-01-03

## 2025-01-03 RX ORDER — NEEDLES, DISPOSABLE 25GX5/8"
1 NEEDLE, DISPOSABLE MISCELLANEOUS
Qty: 50 EACH | Refills: 0 | Status: SHIPPED | OUTPATIENT
Start: 2025-01-03

## 2025-01-03 RX ORDER — TESTOSTERONE CYPIONATE 200 MG/ML
200 INJECTION, SOLUTION INTRAMUSCULAR
Qty: 6 ML | Refills: 1 | Status: SHIPPED | OUTPATIENT
Start: 2025-01-03 | End: 2025-07-03

## 2025-01-03 NOTE — TELEPHONE ENCOUNTER
Comments: [employer] message sent regarding appt    Last Office Visit (last PCP visit):   11/25/2024    Next Visit Date:  No future appointments.    **If hasn't been seen in over a year OR hasn't followed up according to last diabetes/ADHD visit, make appointment for patient before sending refill to provider.    Rx requested:  Requested Prescriptions     Pending Prescriptions Disp Refills    albuterol sulfate HFA (PROVENTIL;VENTOLIN;PROAIR) 108 (90 Base) MCG/ACT inhaler [Pharmacy Med Name: Albuterol Sulfate HFA Inhalation Aerosol Solution 108 (90 Base) MCG/ACT] 6.7 g 0     Sig: INHALE 2 PUFFS BY MOUTH INTO THE LUNGS EVERY 6 HOURS AS NEEDED FOR WHEEZING    albuterol (PROVENTIL) (2.5 MG/3ML) 0.083% nebulizer solution [Pharmacy Med Name: Albuterol Sulfate Inhalation Nebulization Solution (2.5 MG/3ML) 0.083%] 150 mL 0     Sig: INHALE THE CONTENTS OF ONE VIAL (3 ML) VIA NEBULIZATION EVERY 6 HOURS AS NEEDED FOR WHEEZING

## 2025-02-04 DIAGNOSIS — K21.9 GASTRO-ESOPHAGEAL REFLUX DISEASE WITHOUT ESOPHAGITIS: ICD-10-CM

## 2025-02-04 RX ORDER — OMEPRAZOLE 40 MG/1
CAPSULE, DELAYED RELEASE ORAL
Qty: 90 CAPSULE | Refills: 0 | Status: SHIPPED | OUTPATIENT
Start: 2025-02-04

## 2025-02-04 NOTE — TELEPHONE ENCOUNTER
Comments: loree sent    Last Office Visit (last PCP visit):   11/25/2024    Next Visit Date:  No future appointments.    **If hasn't been seen in over a year OR hasn't followed up according to last diabetes/ADHD visit, make appointment for patient before sending refill to provider.    Rx requested:  Requested Prescriptions     Pending Prescriptions Disp Refills    omeprazole (PRILOSEC) 40 MG delayed release capsule [Pharmacy Med Name: Omeprazole Oral Capsule Delayed Release 40 MG] 90 capsule 0     Sig: TAKE ONE CAPSULE BY MOUTH EVERY DAY

## 2025-02-20 ENCOUNTER — OFFICE VISIT (OUTPATIENT)
Dept: FAMILY MEDICINE CLINIC | Age: 40
End: 2025-02-20

## 2025-02-20 VITALS
SYSTOLIC BLOOD PRESSURE: 120 MMHG | TEMPERATURE: 97.5 F | HEIGHT: 69 IN | WEIGHT: 238 LBS | HEART RATE: 92 BPM | BODY MASS INDEX: 35.25 KG/M2 | DIASTOLIC BLOOD PRESSURE: 80 MMHG | OXYGEN SATURATION: 95 %

## 2025-02-20 DIAGNOSIS — K21.9 GASTRO-ESOPHAGEAL REFLUX DISEASE WITHOUT ESOPHAGITIS: ICD-10-CM

## 2025-02-20 DIAGNOSIS — J45.40 MODERATE PERSISTENT ASTHMA WITHOUT COMPLICATION: ICD-10-CM

## 2025-02-20 DIAGNOSIS — R73.9 HYPERGLYCEMIA: ICD-10-CM

## 2025-02-20 DIAGNOSIS — F41.9 ANXIETY: ICD-10-CM

## 2025-02-20 DIAGNOSIS — J40 BRONCHITIS: ICD-10-CM

## 2025-02-20 DIAGNOSIS — J20.9 ACUTE BRONCHITIS, UNSPECIFIED ORGANISM: ICD-10-CM

## 2025-02-20 DIAGNOSIS — N52.9 ERECTILE DYSFUNCTION, UNSPECIFIED ERECTILE DYSFUNCTION TYPE: ICD-10-CM

## 2025-02-20 DIAGNOSIS — R06.09 DYSPNEA ON EXERTION: ICD-10-CM

## 2025-02-20 DIAGNOSIS — L30.9 ECZEMA, UNSPECIFIED TYPE: ICD-10-CM

## 2025-02-20 DIAGNOSIS — Z12.5 SCREENING PSA (PROSTATE SPECIFIC ANTIGEN): ICD-10-CM

## 2025-02-20 DIAGNOSIS — J45.901 MILD ASTHMA WITH EXACERBATION, UNSPECIFIED WHETHER PERSISTENT: ICD-10-CM

## 2025-02-20 DIAGNOSIS — E29.1 HYPOGONADISM IN MALE: Primary | ICD-10-CM

## 2025-02-20 DIAGNOSIS — Z13.220 SCREENING, LIPID: ICD-10-CM

## 2025-02-20 RX ORDER — TESTOSTERONE CYPIONATE 200 MG/ML
200 INJECTION, SOLUTION INTRAMUSCULAR
Qty: 6 ML | Refills: 1 | Status: SHIPPED | OUTPATIENT
Start: 2025-02-20 | End: 2025-08-20

## 2025-02-20 RX ORDER — METHYLPREDNISOLONE 4 MG/1
TABLET ORAL
Qty: 1 KIT | Refills: 0 | Status: SHIPPED | OUTPATIENT
Start: 2025-02-20

## 2025-02-20 RX ORDER — FLUTICASONE PROPIONATE AND SALMETEROL 100; 50 UG/1; UG/1
1 POWDER RESPIRATORY (INHALATION) EVERY 12 HOURS
Qty: 180 EACH | Refills: 1 | Status: SHIPPED | OUTPATIENT
Start: 2025-02-20

## 2025-02-20 RX ORDER — HYDROXYZINE PAMOATE 25 MG/1
25 CAPSULE ORAL 3 TIMES DAILY PRN
Qty: 270 CAPSULE | Refills: 1 | Status: SHIPPED | OUTPATIENT
Start: 2025-02-20

## 2025-02-20 RX ORDER — PANTOPRAZOLE SODIUM 40 MG/1
40 TABLET, DELAYED RELEASE ORAL
Qty: 90 TABLET | Refills: 2 | Status: SHIPPED | OUTPATIENT
Start: 2025-02-20

## 2025-02-20 RX ORDER — OMEPRAZOLE 40 MG/1
CAPSULE, DELAYED RELEASE ORAL
Qty: 90 CAPSULE | Refills: 1 | Status: CANCELLED | OUTPATIENT
Start: 2025-02-20

## 2025-02-20 RX ORDER — NEEDLES, DISPOSABLE 25GX5/8"
1 NEEDLE, DISPOSABLE MISCELLANEOUS
Qty: 50 EACH | Refills: 3 | Status: SHIPPED | OUTPATIENT
Start: 2025-02-20

## 2025-02-20 RX ORDER — ALBUTEROL SULFATE 0.83 MG/ML
2.5 SOLUTION RESPIRATORY (INHALATION) EVERY 6 HOURS PRN
Qty: 150 ML | Refills: 2 | Status: SHIPPED | OUTPATIENT
Start: 2025-02-20

## 2025-02-20 RX ORDER — CLOTRIMAZOLE AND BETAMETHASONE DIPROPIONATE 10; .64 MG/G; MG/G
CREAM TOPICAL
Qty: 45 G | Refills: 5 | Status: SHIPPED | OUTPATIENT
Start: 2025-02-20

## 2025-02-20 RX ORDER — TADALAFIL 5 MG/1
5 TABLET ORAL DAILY
Qty: 90 TABLET | Refills: 1 | Status: SHIPPED | OUTPATIENT
Start: 2025-02-20

## 2025-02-20 RX ORDER — ALBUTEROL SULFATE 90 UG/1
INHALANT RESPIRATORY (INHALATION)
Qty: 18 G | Refills: 2 | Status: SHIPPED | OUTPATIENT
Start: 2025-02-20

## 2025-02-20 SDOH — ECONOMIC STABILITY: FOOD INSECURITY: WITHIN THE PAST 12 MONTHS, THE FOOD YOU BOUGHT JUST DIDN'T LAST AND YOU DIDN'T HAVE MONEY TO GET MORE.: NEVER TRUE

## 2025-02-20 SDOH — ECONOMIC STABILITY: FOOD INSECURITY: WITHIN THE PAST 12 MONTHS, YOU WORRIED THAT YOUR FOOD WOULD RUN OUT BEFORE YOU GOT MONEY TO BUY MORE.: NEVER TRUE

## 2025-02-20 SDOH — ECONOMIC STABILITY: INCOME INSECURITY: IN THE LAST 12 MONTHS, WAS THERE A TIME WHEN YOU WERE NOT ABLE TO PAY THE MORTGAGE OR RENT ON TIME?: NO

## 2025-02-20 ASSESSMENT — PATIENT HEALTH QUESTIONNAIRE - PHQ9
SUM OF ALL RESPONSES TO PHQ QUESTIONS 1-9: 3
6. FEELING BAD ABOUT YOURSELF - OR THAT YOU ARE A FAILURE OR HAVE LET YOURSELF OR YOUR FAMILY DOWN: NOT AT ALL
10. IF YOU CHECKED OFF ANY PROBLEMS, HOW DIFFICULT HAVE THESE PROBLEMS MADE IT FOR YOU TO DO YOUR WORK, TAKE CARE OF THINGS AT HOME, OR GET ALONG WITH OTHER PEOPLE: NOT DIFFICULT AT ALL
1. LITTLE INTEREST OR PLEASURE IN DOING THINGS: NOT AT ALL
2. FEELING DOWN, DEPRESSED OR HOPELESS: NOT AT ALL
8. MOVING OR SPEAKING SO SLOWLY THAT OTHER PEOPLE COULD HAVE NOTICED. OR THE OPPOSITE - BEING SO FIDGETY OR RESTLESS THAT YOU HAVE BEEN MOVING AROUND A LOT MORE THAN USUAL: NOT AT ALL
10. IF YOU CHECKED OFF ANY PROBLEMS, HOW DIFFICULT HAVE THESE PROBLEMS MADE IT FOR YOU TO DO YOUR WORK, TAKE CARE OF THINGS AT HOME, OR GET ALONG WITH OTHER PEOPLE: NOT DIFFICULT AT ALL
4. FEELING TIRED OR HAVING LITTLE ENERGY: SEVERAL DAYS
2. FEELING DOWN, DEPRESSED OR HOPELESS: NOT AT ALL
SUM OF ALL RESPONSES TO PHQ QUESTIONS 1-9: 3
3. TROUBLE FALLING OR STAYING ASLEEP: MORE THAN HALF THE DAYS
5. POOR APPETITE OR OVEREATING: NOT AT ALL
3. TROUBLE FALLING OR STAYING ASLEEP: MORE THAN HALF THE DAYS
SUM OF ALL RESPONSES TO PHQ9 QUESTIONS 1 & 2: 0
1. LITTLE INTEREST OR PLEASURE IN DOING THINGS: NOT AT ALL
9. THOUGHTS THAT YOU WOULD BE BETTER OFF DEAD, OR OF HURTING YOURSELF: NOT AT ALL
7. TROUBLE CONCENTRATING ON THINGS, SUCH AS READING THE NEWSPAPER OR WATCHING TELEVISION: NOT AT ALL
5. POOR APPETITE OR OVEREATING: NOT AT ALL
9. THOUGHTS THAT YOU WOULD BE BETTER OFF DEAD, OR OF HURTING YOURSELF: NOT AT ALL
6. FEELING BAD ABOUT YOURSELF - OR THAT YOU ARE A FAILURE OR HAVE LET YOURSELF OR YOUR FAMILY DOWN: NOT AT ALL
8. MOVING OR SPEAKING SO SLOWLY THAT OTHER PEOPLE COULD HAVE NOTICED. OR THE OPPOSITE, BEING SO FIGETY OR RESTLESS THAT YOU HAVE BEEN MOVING AROUND A LOT MORE THAN USUAL: NOT AT ALL
SUM OF ALL RESPONSES TO PHQ QUESTIONS 1-9: 3
7. TROUBLE CONCENTRATING ON THINGS, SUCH AS READING THE NEWSPAPER OR WATCHING TELEVISION: NOT AT ALL
4. FEELING TIRED OR HAVING LITTLE ENERGY: SEVERAL DAYS
SUM OF ALL RESPONSES TO PHQ QUESTIONS 1-9: 3
SUM OF ALL RESPONSES TO PHQ QUESTIONS 1-9: 3

## 2025-02-20 NOTE — PROGRESS NOTES
Chief Complaint   Patient presents with    Med Refill Clerical       HPI:  Gonzalo Jasmine is a 39 y.o. male    Follow up  Med discussion     Just had shoulder scope     Due for testosterone monitoring     lamictal XR  Working pretty well    Wt Readings from Last 3 Encounters:   02/20/25 108 kg (238 lb)   11/25/24 107 kg (236 lb)   09/09/24 106.1 kg (234 lb)         Past Medical History:   Diagnosis Date    Asthma     Chronic back pain     Dermatitis     Fatty liver     Fibromyalgia     Gastroesophageal reflux disease with hiatal hernia     Hearing loss     Hypertension     Major depressive disorder, recurrent episode with anxious distress 08/21/2023    Obesity (BMI 30-39.9)     Unspecified convulsions 08/21/2023     Past Surgical History:   Procedure Laterality Date    COLONOSCOPY  2013    ENDOSCOPY, COLON, DIAGNOSTIC  2013    LYMPH NODE BIOPSY      surgery due to cat scratch disease age 1    MEDICATION INJECTION Right 03/24/2023    steroid injection by Dr. Davis    SHOULDER ARTHROSCOPY Right 12/08/2022    RIGHT SHOULDER ARTHROSCOPY ROTATOR CUFF REPAIR, ARTHROSCOPY SUBACROMIAL DECOMPRESSION DEBRIDEMENT performed by Jose Quiñones MD at [facility] OR    SHOULDER ARTHROSCOPY Left 8/18/2023    LEFT SHOULDER ARTHROSCOPIC LYSIS OF ADHESIONS AND MANIPULATION UNDER ANESTHESIA WITH PARTIAL CUFF DEBRIDEMENT performed by [facility] Qu[facility]ones [facility]    WISDOM TOOTH EXTRACTION       Family History   Problem Relation Age of Onset    Cancer Mother     Diabetes Paternal Grandmother     Diabetes Paternal Grandfather      Social History     Socioeconomic History    Marital status:      Spouse name: None    Number of children: 1    Years of education: None    Highest education level: None   Tobacco Use    Smoking status: Never    Smokeless tobacco: Never   Substance and Sexual Activity    Alcohol use: No    Drug use: Yes     Types: Marijuana (Weed)     Comment: gummies    Sexual activity: Yes     Partners: Female

## 2025-04-03 ENCOUNTER — PATIENT MESSAGE (OUTPATIENT)
Dept: FAMILY MEDICINE CLINIC | Age: 40
End: 2025-04-03

## 2025-04-03 DIAGNOSIS — N52.9 ERECTILE DYSFUNCTION, UNSPECIFIED ERECTILE DYSFUNCTION TYPE: ICD-10-CM

## 2025-04-03 DIAGNOSIS — E29.1 HYPOGONADISM IN MALE: ICD-10-CM

## 2025-04-03 RX ORDER — TESTOSTERONE CYPIONATE 200 MG/ML
200 INJECTION, SOLUTION INTRAMUSCULAR
Qty: 6 ML | Refills: 1 | Status: SHIPPED | OUTPATIENT
Start: 2025-04-03 | End: 2025-10-01

## 2025-04-03 RX ORDER — TADALAFIL 5 MG/1
5 TABLET ORAL DAILY
Qty: 90 TABLET | Refills: 0 | Status: SHIPPED | OUTPATIENT
Start: 2025-04-03

## 2025-05-22 ENCOUNTER — TELEMEDICINE ON DEMAND (OUTPATIENT)
Age: 40
End: 2025-05-22
Payer: COMMERCIAL

## 2025-05-22 DIAGNOSIS — J45.909 MILD ASTHMA WITHOUT COMPLICATION, UNSPECIFIED WHETHER PERSISTENT: ICD-10-CM

## 2025-05-22 DIAGNOSIS — K21.9 GASTRO-ESOPHAGEAL REFLUX DISEASE WITHOUT ESOPHAGITIS: ICD-10-CM

## 2025-05-22 DIAGNOSIS — J40 BRONCHITIS: Primary | ICD-10-CM

## 2025-05-22 PROCEDURE — 99213 OFFICE O/P EST LOW 20 MIN: CPT | Performed by: NURSE PRACTITIONER

## 2025-05-22 RX ORDER — PANTOPRAZOLE SODIUM 40 MG/1
40 TABLET, DELAYED RELEASE ORAL
Qty: 7 TABLET | Refills: 0 | Status: SHIPPED | OUTPATIENT
Start: 2025-05-22 | End: 2025-05-29

## 2025-05-22 RX ORDER — AZITHROMYCIN 250 MG/1
TABLET, FILM COATED ORAL
Qty: 1 PACKET | Refills: 0 | Status: SHIPPED | OUTPATIENT
Start: 2025-05-22 | End: 2025-06-01

## 2025-05-22 RX ORDER — PREDNISONE 10 MG/1
TABLET ORAL
Qty: 20 TABLET | Refills: 0 | Status: SHIPPED | OUTPATIENT
Start: 2025-05-22 | End: 2025-06-01

## 2025-05-22 NOTE — PROGRESS NOTES
test results, and other pertinent medical information with the patient, discussing the diagnosis and importance of compliance with the treatment plan as well as documenting on the day of the visit.    --Princess Larson, ELPIDIO - CNP

## 2025-06-16 ENCOUNTER — PATIENT MESSAGE (OUTPATIENT)
Dept: FAMILY MEDICINE CLINIC | Age: 40
End: 2025-06-16

## 2025-06-16 DIAGNOSIS — N52.9 ERECTILE DYSFUNCTION, UNSPECIFIED ERECTILE DYSFUNCTION TYPE: ICD-10-CM

## 2025-06-16 RX ORDER — TADALAFIL 5 MG/1
5 TABLET ORAL DAILY
Qty: 90 TABLET | Refills: 0 | Status: SHIPPED | OUTPATIENT
Start: 2025-06-16

## 2025-06-16 RX ORDER — TAMSULOSIN HYDROCHLORIDE 0.4 MG/1
0.4 CAPSULE ORAL DAILY
Qty: 30 CAPSULE | Refills: 3 | Status: SHIPPED | OUTPATIENT
Start: 2025-06-16

## 2025-06-16 NOTE — TELEPHONE ENCOUNTER
Comments: loree sent    Last Office Visit (last PCP visit):   2/20/2025    Next Visit Date:  No future appointments.    **If hasn't been seen in over a year OR hasn't followed up according to last diabetes/ADHD visit, make appointment for patient before sending refill to provider.    Rx requested:  Requested Prescriptions     Pending Prescriptions Disp Refills    tadalafil (CIALIS) 5 MG tablet [Pharmacy Med Name: Tadalafil Oral Tablet 5 MG] 90 tablet 0     Sig: TAKE ONE TABLET BY MOUTH EVERY DAY

## 2025-07-15 DIAGNOSIS — J45.40 MODERATE PERSISTENT ASTHMA WITHOUT COMPLICATION: ICD-10-CM

## 2025-07-15 RX ORDER — FLUTICASONE PROPIONATE AND SALMETEROL 100; 50 UG/1; UG/1
POWDER RESPIRATORY (INHALATION)
Qty: 180 EACH | Refills: 1 | Status: SHIPPED | OUTPATIENT
Start: 2025-07-15

## 2025-07-15 NOTE — TELEPHONE ENCOUNTER
Comments:     Last Office Visit (last PCP visit):   2/20/2025    Next Visit Date:  Future Appointments   Date Time Provider Department Center   8/4/2025 10:15 AM Harry Tarango MD [facility] DE[facility]       **If hasn't been seen in over a year OR hasn't followed up according to last diabetes/ADHD visit, make appointment for patient before sending refill to provider.    Rx requested:  Requested Prescriptions     Pending Prescriptions Disp Refills    WIXELA INHUB 100-50 MCG/ACT AEPB diskus inhaler [Pharmacy Med Name: WIXELA INHUB 100-50MCG/ACT AEPB] 180 each 1     Sig: INHALE ONE PUFF BY MOUTH EVERY MORNING AND EVENING

## 2025-07-16 ENCOUNTER — PATIENT MESSAGE (OUTPATIENT)
Dept: FAMILY MEDICINE CLINIC | Age: 40
End: 2025-07-16

## 2025-07-16 DIAGNOSIS — J20.9 ACUTE BRONCHITIS, UNSPECIFIED ORGANISM: ICD-10-CM

## 2025-07-16 RX ORDER — ALBUTEROL SULFATE 0.83 MG/ML
SOLUTION RESPIRATORY (INHALATION)
Qty: 150 ML | Refills: 2 | Status: SHIPPED | OUTPATIENT
Start: 2025-07-16 | End: 2025-07-16 | Stop reason: SDUPTHER

## 2025-07-16 RX ORDER — ALBUTEROL SULFATE 0.83 MG/ML
SOLUTION RESPIRATORY (INHALATION)
Qty: 150 ML | Refills: 2 | Status: SHIPPED | OUTPATIENT
Start: 2025-07-16

## 2025-07-16 NOTE — TELEPHONE ENCOUNTER
Comments:     Last Office Visit (last PCP visit):   2/20/2025    Next Visit Date:  Future Appointments   Date Time Provider Department Center   8/4/2025 10:15 AM Harry Tarango MD [facility] DE[facility]       **If hasn't been seen in over a year OR hasn't followed up according to last diabetes/ADHD visit, make appointment for patient before sending refill to provider.    Rx requested:  Requested Prescriptions     Pending Prescriptions Disp Refills    albuterol (PROVENTIL) (2.5 MG/3ML) 0.083% nebulizer solution [Pharmacy Med Name: ALBUTEROL SULFATE NEB 2.5MG/3ML (25)] 150 mL 2     Sig: INHALE THE CONTENTS OF 1 VIAL VIA NEBULIZER EVERY 6 HOURS AS NEEDED FOR WHEEZING

## 2025-08-04 ENCOUNTER — OFFICE VISIT (OUTPATIENT)
Dept: FAMILY MEDICINE CLINIC | Age: 40
End: 2025-08-04
Payer: COMMERCIAL

## 2025-08-04 VITALS
OXYGEN SATURATION: 98 % | HEART RATE: 67 BPM | SYSTOLIC BLOOD PRESSURE: 116 MMHG | BODY MASS INDEX: 35.43 KG/M2 | DIASTOLIC BLOOD PRESSURE: 82 MMHG | TEMPERATURE: 98 F | HEIGHT: 69 IN | WEIGHT: 239.2 LBS

## 2025-08-04 DIAGNOSIS — Z13.220 SCREENING, LIPID: ICD-10-CM

## 2025-08-04 DIAGNOSIS — E29.1 HYPOGONADISM IN MALE: ICD-10-CM

## 2025-08-04 DIAGNOSIS — J45.40 MODERATE PERSISTENT ASTHMA WITHOUT COMPLICATION: ICD-10-CM

## 2025-08-04 DIAGNOSIS — R56.9 CONVULSIONS, UNSPECIFIED CONVULSION TYPE (HCC): ICD-10-CM

## 2025-08-04 DIAGNOSIS — Z00.00 PREVENTATIVE HEALTH CARE: Primary | ICD-10-CM

## 2025-08-04 DIAGNOSIS — N52.9 ERECTILE DYSFUNCTION, UNSPECIFIED ERECTILE DYSFUNCTION TYPE: ICD-10-CM

## 2025-08-04 DIAGNOSIS — R06.81 WITNESSED APNEIC SPELLS: ICD-10-CM

## 2025-08-04 DIAGNOSIS — F41.9 ANXIETY: ICD-10-CM

## 2025-08-04 DIAGNOSIS — R73.9 HYPERGLYCEMIA: ICD-10-CM

## 2025-08-04 DIAGNOSIS — Z12.5 SCREENING PSA (PROSTATE SPECIFIC ANTIGEN): ICD-10-CM

## 2025-08-04 LAB
ALBUMIN SERPL-MCNC: 4.5 G/DL (ref 3.5–4.6)
ALP SERPL-CCNC: 59 U/L (ref 35–104)
ALT SERPL-CCNC: 51 U/L (ref 0–41)
ANION GAP SERPL CALCULATED.3IONS-SCNC: 12 MEQ/L (ref 9–15)
AST SERPL-CCNC: 28 U/L (ref 0–40)
BILIRUB SERPL-MCNC: 0.5 MG/DL (ref 0.2–0.7)
BUN SERPL-MCNC: 8 MG/DL (ref 6–20)
CALCIUM SERPL-MCNC: 9.6 MG/DL (ref 8.5–9.9)
CHLORIDE SERPL-SCNC: 106 MEQ/L (ref 95–107)
CHOLEST SERPL-MCNC: 212 MG/DL (ref 0–199)
CO2 SERPL-SCNC: 23 MEQ/L (ref 20–31)
CREAT SERPL-MCNC: 0.94 MG/DL (ref 0.7–1.2)
ERYTHROCYTE [DISTWIDTH] IN BLOOD BY AUTOMATED COUNT: 13.8 % (ref 11.5–14.5)
GLOBULIN SER CALC-MCNC: 2.4 G/DL (ref 2.3–3.5)
GLUCOSE SERPL-MCNC: 107 MG/DL (ref 70–99)
HCT VFR BLD AUTO: 51.2 % (ref 42–52)
HDLC SERPL-MCNC: 43 MG/DL (ref 40–59)
HGB BLD-MCNC: 17.5 G/DL (ref 14–18)
LDLC SERPL CALC-MCNC: 146 MG/DL (ref 0–129)
MCH RBC QN AUTO: 30.1 PG (ref 27–31.3)
MCHC RBC AUTO-ENTMCNC: 34.2 % (ref 33–37)
MCV RBC AUTO: 88 FL (ref 79–92.2)
PLATELET # BLD AUTO: 339 K/UL (ref 130–400)
POTASSIUM SERPL-SCNC: 4.3 MEQ/L (ref 3.4–4.9)
PROT SERPL-MCNC: 6.9 G/DL (ref 6.3–8)
PSA SERPL-MCNC: 0.78 NG/ML (ref 0–4)
RBC # BLD AUTO: 5.82 M/UL (ref 4.7–6.1)
SODIUM SERPL-SCNC: 141 MEQ/L (ref 135–144)
TRIGL SERPL-MCNC: 114 MG/DL (ref 0–150)
TSH REFLEX: 2.24 UIU/ML (ref 0.44–3.86)
WBC # BLD AUTO: 10.2 K/UL (ref 4.8–10.8)

## 2025-08-04 PROCEDURE — 3079F DIAST BP 80-89 MM HG: CPT | Performed by: FAMILY MEDICINE

## 2025-08-04 PROCEDURE — 99396 PREV VISIT EST AGE 40-64: CPT | Performed by: FAMILY MEDICINE

## 2025-08-04 PROCEDURE — 3074F SYST BP LT 130 MM HG: CPT | Performed by: FAMILY MEDICINE

## 2025-08-04 RX ORDER — TADALAFIL 5 MG/1
5 TABLET ORAL DAILY
Qty: 90 TABLET | Refills: 1 | Status: SHIPPED | OUTPATIENT
Start: 2025-08-04

## 2025-08-04 RX ORDER — FLUTICASONE PROPIONATE AND SALMETEROL 100; 50 UG/1; UG/1
POWDER RESPIRATORY (INHALATION)
Qty: 180 EACH | Refills: 1 | Status: CANCELLED | OUTPATIENT
Start: 2025-08-04

## 2025-08-05 LAB
ESTIMATED AVERAGE GLUCOSE: 114 MG/DL
HBA1C MFR BLD: 5.6 % (ref 4–6)
SHBG SERPL-SCNC: 25 NMOL/L (ref 17–56)
TESTOST FREE SERPL-MCNC: 128.4 PG/ML (ref 47–244)
TESTOST SERPL-MCNC: 523 NG/DL (ref 249–836)

## 2025-08-19 DIAGNOSIS — J40 BRONCHITIS: ICD-10-CM

## 2025-08-19 DIAGNOSIS — R06.09 DYSPNEA ON EXERTION: ICD-10-CM

## 2025-08-19 DIAGNOSIS — J45.901 MILD ASTHMA WITH EXACERBATION, UNSPECIFIED WHETHER PERSISTENT: ICD-10-CM

## 2025-08-19 RX ORDER — ALBUTEROL SULFATE 90 UG/1
2 INHALANT RESPIRATORY (INHALATION) EVERY 6 HOURS PRN
Qty: 18 G | Refills: 2 | Status: SHIPPED | OUTPATIENT
Start: 2025-08-19

## 2025-09-03 ENCOUNTER — PATIENT MESSAGE (OUTPATIENT)
Dept: FAMILY MEDICINE CLINIC | Age: 40
End: 2025-09-03

## 2025-09-03 DIAGNOSIS — K21.9 GASTRO-ESOPHAGEAL REFLUX DISEASE WITHOUT ESOPHAGITIS: ICD-10-CM

## 2025-09-04 RX ORDER — PANTOPRAZOLE SODIUM 40 MG/1
40 TABLET, DELAYED RELEASE ORAL
Qty: 7 TABLET | Refills: 0 | Status: SHIPPED | OUTPATIENT
Start: 2025-09-04 | End: 2025-09-11

## (undated) DEVICE — GLOVE ORANGE PI 8   MSG9080

## (undated) DEVICE — TOWEL,OR,DSP,ST,BLUE,STD,4/PK,20PK/CS: Brand: MEDLINE

## (undated) DEVICE — GLOVE SURG SZ 8 L12IN FNGR THK94MIL TRNSLUC YEL LTX HYDRGEL

## (undated) DEVICE — COUNTER NDL 40 COUNT HLD 70 FOAM BLK ADH W/ MAG

## (undated) DEVICE — 3M™ STERI-DRAPE™ U-DRAPE 1015: Brand: STERI-DRAPE™

## (undated) DEVICE — BLADE SAW RESECTOR CUT 4MM

## (undated) DEVICE — DRAPE,U/ SHT,SPLIT,PLAS,STERIL: Brand: MEDLINE

## (undated) DEVICE — GOWN,SIRUS,POLYRNF,BRTHSLV,XLN/XL,20/CS: Brand: MEDLINE

## (undated) DEVICE — MAT FLR SURG QUICKWICK 28X54 IN DISP

## (undated) DEVICE — CHLORAPREP 26ML ORANGE

## (undated) DEVICE — PACK,BASIC: Brand: MEDLINE

## (undated) DEVICE — SPONGE,LAP,18"X18",DLX,XR,ST,5/PK,40/PK: Brand: MEDLINE

## (undated) DEVICE — SHEET,DRAPE,53X77,STERILE: Brand: MEDLINE

## (undated) DEVICE — MEDI-VAC NON-CONDUCTIVE SUCTION TUBING: Brand: CARDINAL HEALTH

## (undated) DEVICE — GOWN,AURORA,NONREINFORCED,LARGE: Brand: MEDLINE

## (undated) DEVICE — OMNI JUGS® LARGE VOLUME CANISTER WITH SOCK: Brand: DEROYAL

## (undated) DEVICE — CANNULA ARTHRO WO SQUIRT CAP 7.25MM

## (undated) DEVICE — SUTURE ETHLN SZ 3-0 L18IN NONABSORBABLE BLK PS-2 L19MM 3/8 1669H

## (undated) DEVICE — [AGGRESSIVE 6-FLUTE BARREL BUR, ARTHROSCOPIC SHAVER BLADE,  DO NOT RESTERILIZE,  DO NOT USE IF PACKAGE IS DAMAGED,  KEEP DRY,  KEEP AWAY FROM SUNLIGHT]: Brand: FORMULA

## (undated) DEVICE — PACK,SHOULDER,DRAPE,POUCH: Brand: MEDLINE

## (undated) DEVICE — BLADE,CARBON-STEEL,11,STRL,DISPOSABLE,TB: Brand: MEDLINE

## (undated) DEVICE — SUTURE NONABSORBABLE MONOFILAMENT 3-0 PS-1 18 IN BLK ETHILON 1663H

## (undated) DEVICE — 4-PORT MANIFOLD: Brand: NEPTUNE 2

## (undated) DEVICE — TUBE IRRIG L8IN LNG PT W/ CONN FOR PMP SYS REDEUCE

## (undated) DEVICE — SUTURE PROL SZ 0 L30IN NONABSORBABLE BLU L36MM CT-1 1/2 CIR 8424H

## (undated) DEVICE — U-DRAPE: Brand: CONVERTORS

## (undated) DEVICE — 3M™ STERI-STRIP™ REINFORCED ADHESIVE SKIN CLOSURES, R1547, 1/2 IN X 4 IN (12 MM X 100 MM), 6 STRIPS/ENVELOPE: Brand: 3M™ STERI-STRIP™

## (undated) DEVICE — TUBING, SUCTION, 9/32" X 12', STRAIGHT: Brand: MEDLINE INDUSTRIES, INC.

## (undated) DEVICE — SPONGE GZ W4XL4IN RAYON POLY CVR W/NONWOVEN FAB STRL 2/PK

## (undated) DEVICE — MARKER SURG SKIN GENTIAN VLT REG TIP W/ 6IN RUL

## (undated) DEVICE — NEEDLE SPNL 18GA L3.5IN W/ QNCKE SHARPER BVL DURA CLICK

## (undated) DEVICE — GLOVE ORTHO 7 1/2   MSG9475

## (undated) DEVICE — KNEE ARTHROSCOPY II-LF: Brand: MEDLINE INDUSTRIES, INC.

## (undated) DEVICE — INTENDED FOR TISSUE SEPARATION, AND OTHER PROCEDURES THAT REQUIRE A SHARP SURGICAL BLADE TO PUNCTURE OR CUT.: Brand: BARD-PARKER ® CARBON RIB-BACK BLADES

## (undated) DEVICE — Device

## (undated) DEVICE — STRIP,CLOSURE,WOUND,MEDI-STRIP,1/2X4: Brand: MEDLINE

## (undated) DEVICE — GLOVE ORANGE PI 7 1/2   MSG9075

## (undated) DEVICE — BLADE SHV DIA4MM RED RESECT FOR GEN DEB REM OF PERIOST FR

## (undated) DEVICE — FLUID CONTROL SHOULDER DRAPE PACK: Brand: CONVERTORS

## (undated) DEVICE — MEDI-VAC TUBING CONNECT "T" POLYPROPYLENE: Brand: CARDINAL HEALTH

## (undated) DEVICE — UNIVERSAL CANNULA SET WITH FENESTRATIONS 5.5 MM (I.D.) X 70 MM: Brand: CONMED

## (undated) DEVICE — BASIC SINGLE BASIN-LF: Brand: MEDLINE INDUSTRIES, INC.

## (undated) DEVICE — SPONGE GZ W4XL4IN COT 12 PLY TYP VII WVN C FLD DSGN

## (undated) DEVICE — 3M™ MICROFOAM™ TAPE 1528-4: Brand: 3M™ MICROFOAM™

## (undated) DEVICE — COVER FT SWCH W15XL17IN GRY ALL OEC SYS

## (undated) DEVICE — [RESECTOR CUTTER, ARTHROSCOPIC SHAVER BLADE,  DO NOT RESTERILIZE,  DO NOT USE IF PACKAGE IS DAMAGED,  KEEP DRY,  KEEP AWAY FROM SUNLIGHT]: Brand: FORMULA

## (undated) DEVICE — APPLICATOR MEDICATED 26 CC SOLUTION HI LT ORNG CHLORAPREP

## (undated) DEVICE — COVER LT HNDL BLU PLAS

## (undated) DEVICE — ELECTRODE PT RET AD L9FT HI MOIST COND ADH HYDRGEL CORDED

## (undated) DEVICE — PAD,ABDOMINAL,8"X10",ST,LF: Brand: MEDLINE

## (undated) DEVICE — SHOULDER CANNULA SET WITHOUT FENESTRATIONS, 5.5 MM (I.D.) X 70 MM: Brand: CONMED

## (undated) DEVICE — BLADE CLIPPER GEN PURP NS

## (undated) DEVICE — MAT FLR ABSRB ECODRI-SAFE

## (undated) DEVICE — MARKER SURG SKIN GENTIAN VLT REG TIP W/ 6IN RUL DYNJSM01

## (undated) DEVICE — 90-S CRUISE, SUCTION PROBE, NON-BENDABLE, MAX CUT LEVEL 1: Brand: SERFAS ENERGY

## (undated) DEVICE — SLEEVE TRAC FOAM COBAN DISP FOR 3 PNT SHLDR DISTR SYS STAR